# Patient Record
Sex: MALE | Race: BLACK OR AFRICAN AMERICAN | Employment: OTHER | URBAN - METROPOLITAN AREA
[De-identification: names, ages, dates, MRNs, and addresses within clinical notes are randomized per-mention and may not be internally consistent; named-entity substitution may affect disease eponyms.]

---

## 2017-02-20 ENCOUNTER — HOSPITAL ENCOUNTER (EMERGENCY)
Age: 52
Discharge: HOME | End: 2017-02-20
Attending: EMERGENCY MEDICINE
Payer: COMMERCIAL

## 2017-02-20 VITALS
HEART RATE: 98 BPM | RESPIRATION RATE: 16 BRPM | HEIGHT: 68 IN | WEIGHT: 240 LBS | OXYGEN SATURATION: 99 % | TEMPERATURE: 99 F | DIASTOLIC BLOOD PRESSURE: 98 MMHG | BODY MASS INDEX: 36.37 KG/M2 | SYSTOLIC BLOOD PRESSURE: 151 MMHG

## 2017-02-20 DIAGNOSIS — K11.20 SIALOADENITIS: Primary | ICD-10-CM

## 2017-02-20 PROCEDURE — 99282 EMERGENCY DEPT VISIT SF MDM: CPT

## 2017-02-20 RX ORDER — IBUPROFEN 800 MG/1
800 TABLET ORAL EVERY 8 HOURS PRN
Qty: 30 TABLET | Refills: 0 | Status: ON HOLD | OUTPATIENT
Start: 2017-02-20 | End: 2018-12-01 | Stop reason: ALTCHOICE

## 2017-02-20 ASSESSMENT — ENCOUNTER SYMPTOMS
SINUS PRESSURE: 0
VOMITING: 0
RHINORRHEA: 0
FACIAL SWELLING: 1
BACK PAIN: 0
ABDOMINAL PAIN: 0
SHORTNESS OF BREATH: 0
NAUSEA: 0
SORE THROAT: 0
COUGH: 0

## 2017-02-20 ASSESSMENT — PAIN DESCRIPTION - ORIENTATION: ORIENTATION: ANTERIOR

## 2017-02-20 ASSESSMENT — PAIN DESCRIPTION - ONSET: ONSET: PROGRESSIVE

## 2017-02-20 ASSESSMENT — PAIN DESCRIPTION - FREQUENCY: FREQUENCY: CONTINUOUS

## 2017-02-20 ASSESSMENT — PAIN DESCRIPTION - LOCATION: LOCATION: THROAT

## 2017-02-20 ASSESSMENT — PAIN DESCRIPTION - PROGRESSION: CLINICAL_PROGRESSION: NOT CHANGED

## 2017-02-20 ASSESSMENT — PAIN SCALES - GENERAL: PAINLEVEL_OUTOF10: 7

## 2017-02-20 ASSESSMENT — PAIN DESCRIPTION - DESCRIPTORS: DESCRIPTORS: ACHING

## 2017-02-20 ASSESSMENT — PAIN DESCRIPTION - PAIN TYPE: TYPE: ACUTE PAIN

## 2017-02-24 ENCOUNTER — OFFICE VISIT (OUTPATIENT)
Dept: INTERNAL MEDICINE | Facility: CLINIC | Age: 52
End: 2017-02-24

## 2017-02-24 VITALS
WEIGHT: 238 LBS | HEART RATE: 99 BPM | HEIGHT: 68 IN | SYSTOLIC BLOOD PRESSURE: 134 MMHG | DIASTOLIC BLOOD PRESSURE: 82 MMHG | BODY MASS INDEX: 36.07 KG/M2

## 2017-02-24 DIAGNOSIS — F17.200 SMOKING: ICD-10-CM

## 2017-02-24 DIAGNOSIS — K11.20 SIALADENITIS: Primary | ICD-10-CM

## 2017-02-24 DIAGNOSIS — G47.33 SLEEP APNEA, OBSTRUCTIVE: ICD-10-CM

## 2017-02-24 DIAGNOSIS — E11.9 TYPE 2 DIABETES MELLITUS WITHOUT COMPLICATION, UNSPECIFIED LONG TERM INSULIN USE STATUS: ICD-10-CM

## 2017-02-24 DIAGNOSIS — E78.5 DYSLIPIDEMIA: ICD-10-CM

## 2017-02-24 PROCEDURE — 99213 OFFICE O/P EST LOW 20 MIN: CPT | Performed by: STUDENT IN AN ORGANIZED HEALTH CARE EDUCATION/TRAINING PROGRAM

## 2017-02-24 RX ORDER — GLUCOSAM/CHON-MSM1/C/MANG/BOSW 500-416.6
TABLET ORAL
Refills: 1 | Status: ON HOLD | COMMUNITY
Start: 2017-02-07 | End: 2018-09-08 | Stop reason: CLARIF

## 2017-02-24 RX ORDER — NICOTINE 14 MG/24H
1 PATCH, EXTENDED RELEASE TRANSDERMAL DAILY
Refills: 0 | Status: ON HOLD | COMMUNITY
Start: 2017-02-07 | End: 2018-09-08

## 2017-03-15 ENCOUNTER — TELEPHONE (OUTPATIENT)
Dept: INTERNAL MEDICINE | Age: 52
End: 2017-03-15

## 2017-04-05 ENCOUNTER — TELEPHONE (OUTPATIENT)
Dept: INTERNAL MEDICINE | Age: 52
End: 2017-04-05

## 2017-06-27 ENCOUNTER — HOSPITAL ENCOUNTER (OUTPATIENT)
Age: 52
Setting detail: SPECIMEN
Discharge: HOME | End: 2017-06-27
Payer: COMMERCIAL

## 2017-06-27 LAB
ABSOLUTE EOS #: 0.1 K/UL (ref 0–0.4)
ABSOLUTE LYMPH #: 1.7 K/UL (ref 1–4.8)
ABSOLUTE MONO #: 0.6 K/UL (ref 0.1–1.2)
ALBUMIN SERPL-MCNC: 3.9 G/DL (ref 3.5–5.2)
ALBUMIN/GLOBULIN RATIO: 2 (ref 1–2.5)
ALP BLD-CCNC: 73 U/L (ref 40–129)
ALT SERPL-CCNC: 38 U/L (ref 5–41)
ANION GAP SERPL CALCULATED.3IONS-SCNC: 15 MMOL/L (ref 9–17)
AST SERPL-CCNC: 31 U/L
BASOPHILS # BLD: 0 %
BASOPHILS ABSOLUTE: 0 K/UL (ref 0–0.2)
BILIRUB SERPL-MCNC: 0.66 MG/DL (ref 0.3–1.2)
BILIRUBIN DIRECT: 0.14 MG/DL
BILIRUBIN, INDIRECT: 0.52 MG/DL (ref 0–1)
BUN BLDV-MCNC: 13 MG/DL (ref 6–20)
BUN/CREAT BLD: ABNORMAL (ref 9–20)
CALCIUM SERPL-MCNC: 9.3 MG/DL (ref 8.6–10.4)
CHLORIDE BLD-SCNC: 102 MMOL/L (ref 98–107)
CHOLESTEROL, FASTING: 208 MG/DL
CHOLESTEROL/HDL RATIO: 2.7
CO2: 23 MMOL/L (ref 20–31)
CREAT SERPL-MCNC: 0.93 MG/DL (ref 0.7–1.2)
DIFFERENTIAL TYPE: ABNORMAL
EOSINOPHILS RELATIVE PERCENT: 1 %
GFR AFRICAN AMERICAN: >60 ML/MIN
GFR NON-AFRICAN AMERICAN: >60 ML/MIN
GFR SERPL CREATININE-BSD FRML MDRD: ABNORMAL ML/MIN/{1.73_M2}
GFR SERPL CREATININE-BSD FRML MDRD: ABNORMAL ML/MIN/{1.73_M2}
GLOBULIN: ABNORMAL G/DL (ref 1.5–3.8)
GLUCOSE BLD-MCNC: 115 MG/DL (ref 70–99)
HCT VFR BLD CALC: 45 % (ref 41–53)
HDLC SERPL-MCNC: 78 MG/DL
HEMOGLOBIN: 15 G/DL (ref 13.5–17.5)
LDL CHOLESTEROL: 106 MG/DL (ref 0–130)
LYMPHOCYTES # BLD: 23 %
MCH RBC QN AUTO: 28.5 PG (ref 26–34)
MCHC RBC AUTO-ENTMCNC: 33.3 G/DL (ref 31–37)
MCV RBC AUTO: 85.5 FL (ref 80–100)
MONOCYTES # BLD: 8 %
PDW BLD-RTO: 15.5 % (ref 12.5–15.4)
PLATELET # BLD: 326 K/UL (ref 140–450)
PLATELET ESTIMATE: ABNORMAL
PMV BLD AUTO: 8.4 FL (ref 6–12)
POTASSIUM SERPL-SCNC: 4.5 MMOL/L (ref 3.7–5.3)
PROSTATE SPECIFIC ANTIGEN: 0.18 UG/L
RBC # BLD: 5.27 M/UL (ref 4.5–5.9)
RBC # BLD: ABNORMAL 10*6/UL
SEG NEUTROPHILS: 68 %
SEGMENTED NEUTROPHILS ABSOLUTE COUNT: 5 K/UL (ref 1.8–7.7)
SODIUM BLD-SCNC: 140 MMOL/L (ref 135–144)
THYROXINE, FREE: 1.26 NG/DL (ref 0.93–1.7)
TOTAL PROTEIN: 5.9 G/DL (ref 6.4–8.3)
TRIGLYCERIDE, FASTING: 119 MG/DL
TSH SERPL DL<=0.05 MIU/L-ACNC: 0.91 MIU/L (ref 0.3–5)
VITAMIN D 25-HYDROXY: 15.2 NG/ML (ref 30–100)
VLDLC SERPL CALC-MCNC: ABNORMAL MG/DL (ref 1–30)
WBC # BLD: 7.4 K/UL (ref 3.5–11)
WBC # BLD: ABNORMAL 10*3/UL

## 2017-06-28 LAB
ESTIMATED AVERAGE GLUCOSE: 151 MG/DL
HBA1C MFR BLD: 6.9 % (ref 4–6)

## 2018-09-08 ENCOUNTER — HOSPITAL ENCOUNTER (INPATIENT)
Age: 53
LOS: 9 days | Discharge: HOME | DRG: 750 | End: 2018-09-17
Attending: EMERGENCY MEDICINE | Admitting: PSYCHIATRY & NEUROLOGY
Payer: COMMERCIAL

## 2018-09-08 DIAGNOSIS — E78.5 DYSLIPIDEMIA: ICD-10-CM

## 2018-09-08 DIAGNOSIS — R45.851 DEPRESSION WITH SUICIDAL IDEATION: Primary | ICD-10-CM

## 2018-09-08 DIAGNOSIS — F32.A DEPRESSION WITH SUICIDAL IDEATION: Primary | ICD-10-CM

## 2018-09-08 DIAGNOSIS — M54.9 BACK PAIN: ICD-10-CM

## 2018-09-08 PROBLEM — F20.9 SCHIZOPHRENIA (HCC): Status: ACTIVE | Noted: 2018-09-08

## 2018-09-08 LAB — GLUCOSE BLD-MCNC: 154 MG/DL (ref 75–110)

## 2018-09-08 PROCEDURE — 82947 ASSAY GLUCOSE BLOOD QUANT: CPT

## 2018-09-08 PROCEDURE — 99285 EMERGENCY DEPT VISIT HI MDM: CPT

## 2018-09-08 PROCEDURE — 1240000000 HC EMOTIONAL WELLNESS R&B

## 2018-09-08 PROCEDURE — 6370000000 HC RX 637 (ALT 250 FOR IP): Performed by: PSYCHIATRY & NEUROLOGY

## 2018-09-08 RX ORDER — LISINOPRIL 5 MG/1
5 TABLET ORAL DAILY
Status: DISCONTINUED | OUTPATIENT
Start: 2018-09-08 | End: 2018-09-17 | Stop reason: HOSPADM

## 2018-09-08 RX ORDER — BUPROPION HYDROCHLORIDE 150 MG/1
150 TABLET ORAL DAILY
Status: ON HOLD | COMMUNITY
End: 2018-09-08 | Stop reason: CLARIF

## 2018-09-08 RX ORDER — IBUPROFEN 800 MG/1
800 TABLET ORAL EVERY 8 HOURS PRN
Status: DISCONTINUED | OUTPATIENT
Start: 2018-09-08 | End: 2018-09-17 | Stop reason: HOSPADM

## 2018-09-08 RX ORDER — BENZTROPINE MESYLATE 1 MG/ML
2 INJECTION INTRAMUSCULAR; INTRAVENOUS DAILY PRN
Status: DISCONTINUED | OUTPATIENT
Start: 2018-09-08 | End: 2018-09-17 | Stop reason: HOSPADM

## 2018-09-08 RX ORDER — GABAPENTIN 600 MG/1
600 TABLET ORAL 3 TIMES DAILY
Status: ON HOLD | COMMUNITY
End: 2018-09-17

## 2018-09-08 RX ORDER — MELOXICAM 15 MG/1
15 TABLET ORAL
Status: ON HOLD | COMMUNITY
End: 2018-09-08 | Stop reason: ALTCHOICE

## 2018-09-08 RX ORDER — METFORMIN HYDROCHLORIDE 500 MG/1
500 TABLET, EXTENDED RELEASE ORAL
Status: ON HOLD | COMMUNITY
End: 2018-09-08 | Stop reason: SDUPTHER

## 2018-09-08 RX ORDER — PANTOPRAZOLE SODIUM 40 MG/1
40 GRANULE, DELAYED RELEASE ORAL
Status: ON HOLD | COMMUNITY
End: 2018-09-17 | Stop reason: HOSPADM

## 2018-09-08 RX ORDER — TRAZODONE HYDROCHLORIDE 100 MG/1
100 TABLET ORAL NIGHTLY
Status: DISCONTINUED | OUTPATIENT
Start: 2018-09-08 | End: 2018-09-17 | Stop reason: HOSPADM

## 2018-09-08 RX ORDER — GABAPENTIN 600 MG/1
600 TABLET ORAL 3 TIMES DAILY
Status: DISCONTINUED | OUTPATIENT
Start: 2018-09-08 | End: 2018-09-17 | Stop reason: HOSPADM

## 2018-09-08 RX ORDER — SIMVASTATIN 20 MG
20 TABLET ORAL
Status: ON HOLD | COMMUNITY
End: 2018-09-08 | Stop reason: CLARIF

## 2018-09-08 RX ORDER — SERTRALINE HYDROCHLORIDE 100 MG/1
100 TABLET, FILM COATED ORAL DAILY
Status: DISCONTINUED | OUTPATIENT
Start: 2018-09-09 | End: 2018-09-17 | Stop reason: HOSPADM

## 2018-09-08 RX ORDER — HYDROXYZINE PAMOATE 50 MG/1
50 CAPSULE ORAL
Status: ON HOLD | COMMUNITY
End: 2018-09-08 | Stop reason: SDUPTHER

## 2018-09-08 RX ORDER — PRAVASTATIN SODIUM 20 MG
20 TABLET ORAL
Status: ON HOLD | COMMUNITY
End: 2018-09-08 | Stop reason: CLARIF

## 2018-09-08 RX ORDER — MAGNESIUM HYDROXIDE/ALUMINUM HYDROXICE/SIMETHICONE 120; 1200; 1200 MG/30ML; MG/30ML; MG/30ML
30 SUSPENSION ORAL EVERY 6 HOURS PRN
Status: DISCONTINUED | OUTPATIENT
Start: 2018-09-08 | End: 2018-09-17 | Stop reason: HOSPADM

## 2018-09-08 RX ORDER — PANTOPRAZOLE SODIUM 40 MG/1
40 TABLET, DELAYED RELEASE ORAL
Status: DISCONTINUED | OUTPATIENT
Start: 2018-09-09 | End: 2018-09-17 | Stop reason: HOSPADM

## 2018-09-08 RX ORDER — SIMVASTATIN 20 MG
20 TABLET ORAL NIGHTLY
Status: DISCONTINUED | OUTPATIENT
Start: 2018-09-08 | End: 2018-09-17 | Stop reason: HOSPADM

## 2018-09-08 RX ORDER — OMEPRAZOLE 20 MG/1
20 CAPSULE, DELAYED RELEASE ORAL
Status: ON HOLD | COMMUNITY
End: 2018-09-08

## 2018-09-08 RX ORDER — SERTRALINE HYDROCHLORIDE 100 MG/1
100 TABLET, FILM COATED ORAL
Status: ON HOLD | COMMUNITY
End: 2018-09-08 | Stop reason: SDUPTHER

## 2018-09-08 RX ORDER — PANTOPRAZOLE SODIUM 40 MG/1
40 GRANULE, DELAYED RELEASE ORAL
Status: DISCONTINUED | OUTPATIENT
Start: 2018-09-09 | End: 2018-09-08

## 2018-09-08 RX ORDER — LISINOPRIL 5 MG/1
5 TABLET ORAL DAILY
Status: ON HOLD | COMMUNITY
End: 2018-09-17

## 2018-09-08 RX ORDER — HYDROXYZINE HYDROCHLORIDE 25 MG/1
50 TABLET, FILM COATED ORAL 3 TIMES DAILY PRN
Status: DISCONTINUED | OUTPATIENT
Start: 2018-09-08 | End: 2018-09-17 | Stop reason: HOSPADM

## 2018-09-08 RX ORDER — QUETIAPINE FUMARATE 400 MG/1
400 TABLET, FILM COATED ORAL
Status: ON HOLD | COMMUNITY
End: 2018-09-08 | Stop reason: CLARIF

## 2018-09-08 RX ORDER — TRAZODONE HYDROCHLORIDE 100 MG/1
100 TABLET ORAL NIGHTLY
Status: ON HOLD | COMMUNITY
End: 2018-09-17

## 2018-09-08 RX ORDER — TRAMADOL HYDROCHLORIDE 50 MG/1
50 TABLET ORAL 2 TIMES DAILY PRN
Status: DISCONTINUED | OUTPATIENT
Start: 2018-09-08 | End: 2018-09-17 | Stop reason: HOSPADM

## 2018-09-08 RX ORDER — QUETIAPINE FUMARATE 200 MG/1
400 TABLET, FILM COATED ORAL NIGHTLY
Status: DISCONTINUED | OUTPATIENT
Start: 2018-09-08 | End: 2018-09-12

## 2018-09-08 RX ADMIN — GABAPENTIN 600 MG: 600 TABLET, FILM COATED ORAL at 14:27

## 2018-09-08 RX ADMIN — SIMVASTATIN 20 MG: 20 TABLET, FILM COATED ORAL at 21:02

## 2018-09-08 RX ADMIN — TRAZODONE HYDROCHLORIDE 100 MG: 100 TABLET ORAL at 21:02

## 2018-09-08 RX ADMIN — HYDROXYZINE HYDROCHLORIDE 50 MG: 25 TABLET, FILM COATED ORAL at 21:02

## 2018-09-08 RX ADMIN — QUETIAPINE FUMARATE 400 MG: 200 TABLET ORAL at 21:02

## 2018-09-08 RX ADMIN — TRAMADOL HYDROCHLORIDE 50 MG: 50 TABLET, FILM COATED ORAL at 14:28

## 2018-09-08 RX ADMIN — GABAPENTIN 600 MG: 600 TABLET, FILM COATED ORAL at 21:02

## 2018-09-08 RX ADMIN — LISINOPRIL 5 MG: 5 TABLET ORAL at 14:27

## 2018-09-08 ASSESSMENT — ENCOUNTER SYMPTOMS
DIARRHEA: 0
ABDOMINAL PAIN: 0
EYE REDNESS: 0
NAUSEA: 0
VOMITING: 0
CONSTIPATION: 0
TROUBLE SWALLOWING: 0
EYE PAIN: 0
SHORTNESS OF BREATH: 0
BACK PAIN: 0
EYE DISCHARGE: 0
FACIAL SWELLING: 0
COUGH: 0
SORE THROAT: 0
BLOOD IN STOOL: 0
WHEEZING: 0
SINUS PRESSURE: 0
COLOR CHANGE: 0
RHINORRHEA: 0
CHEST TIGHTNESS: 0

## 2018-09-08 ASSESSMENT — PAIN DESCRIPTION - LOCATION
LOCATION: BACK

## 2018-09-08 ASSESSMENT — SLEEP AND FATIGUE QUESTIONNAIRES
DIFFICULTY FALLING ASLEEP: NO
SLEEP PATTERN: DISTURBED/INTERRUPTED SLEEP
AVERAGE NUMBER OF SLEEP HOURS: 5
DIFFICULTY STAYING ASLEEP: YES
DIFFICULTY ARISING: NO
RESTFUL SLEEP: NO
DO YOU HAVE DIFFICULTY SLEEPING: YES
DO YOU USE A SLEEP AID: NO

## 2018-09-08 ASSESSMENT — PAIN DESCRIPTION - DESCRIPTORS: DESCRIPTORS: CONSTANT

## 2018-09-08 ASSESSMENT — PAIN DESCRIPTION - PAIN TYPE
TYPE: CHRONIC PAIN

## 2018-09-08 ASSESSMENT — PAIN SCALES - GENERAL
PAINLEVEL_OUTOF10: 6
PAINLEVEL_OUTOF10: 4
PAINLEVEL_OUTOF10: 2
PAINLEVEL_OUTOF10: 6

## 2018-09-08 ASSESSMENT — LIFESTYLE VARIABLES
HISTORY_ALCOHOL_USE: NO
HISTORY_ALCOHOL_USE: NO

## 2018-09-08 ASSESSMENT — PATIENT HEALTH QUESTIONNAIRE - PHQ9: SUM OF ALL RESPONSES TO PHQ QUESTIONS 1-9: 17

## 2018-09-08 ASSESSMENT — PAIN DESCRIPTION - ORIENTATION: ORIENTATION: LOWER

## 2018-09-08 NOTE — BH NOTE
[address] [facility]  Admission Note     Admission Type:   Admission Type: Voluntary from [facility]    Reason for admission:  Reason for Admission:  (off medications for 2 weeks, hearing voices to harm self thoughts to shoot self)   Flat affect, minimal conversation during admit process  Denies any history of suicide attempts, but stated he has had thoughts before. Reports living in an apartment he can return to, but would not elaborate. PATIENT STRENGTHS:  Strengths: Communication, Connection to output provider, No significant Physical Illness    Patient Strengths and Limitations:  Limitations: Hopeless about future, Lacks leisure interests    Addictive Behavior:   Addictive Behavior  In the past 3 months, have you felt or has someone told you that you have a problem with:  : None  Do you have a history of Chemical Use?: No  Do you have a history of Alcohol Use?: No  Do you have a history of Street Drug Abuse?: No  Histroy of Prescripton Drug Abuse?: No    Medical Problems:   Past Medical History:   Diagnosis Date    Back pain     [facility]    Generalized OA     GERD (gastroesophageal reflux disease)     HTN (hypertension)     Unspecified sleep apnea     c  pap       Status EXAM:  Status and Exam  Normal: No  Facial Expression: Flat  Affect: Blunt  Level of Consciousness: Alert  Mood:Normal: No  Mood: Depressed  Motor Activity:Normal: No  Motor Activity: Decreased  Interview Behavior: Cooperative  Preception: [place] to Person, [place] to Time, [place] to Place, [place] to Situation  Attention:Normal: Yes  Thought Content:Normal: Yes  Hallucinations: Auditory (Comment), Command(Comment) (to shoot self)  Delusions: No  Memory:Normal: Yes  Insight and Judgment: No  Insight and Judgment: Poor Judgment, Poor Insight, Unmotivated  Present Suicidal Ideation: Yes  Present Homicidal Ideation: No    Patient's belongings logged and signed by pt as witness.  See valuables

## 2018-09-08 NOTE — ED PROVIDER NOTES
[address]  eMERGENCY dEPARTMENT eNCOUnter      Pt Name: Annabel Nava  MRN: [id number]  [place] 1965  Date of evaluation: 9/8/18      CHIEF COMPLAINT       Chief Complaint   Patient presents with    Suicidal         HISTORY OF PRESENT ILLNESS    Annabel Nava is a 48 y.o. male who presents complaining of Psychiatric evaluation. Patient has been off his meds for the last 2 weeks. Patient states that he is hearing voices that are telling him to stab himself and throat. Patient is having urges to do this. Patient came in because he does not want to kill himself and wants to get help. Patient denies drug or alcohol use. Patient denies any somatic complaints. REVIEW OF SYSTEMS       Review of Systems   Constitutional: Negative for activity change, appetite change, chills, diaphoresis and fever. HENT: Negative for congestion, ear pain, facial swelling, nosebleeds, rhinorrhea, sinus pressure, sore throat and trouble swallowing. Eyes: Negative for pain, discharge and redness. Respiratory: Negative for cough, chest tightness, shortness of breath and wheezing. Cardiovascular: Negative for chest pain, palpitations and leg swelling. Gastrointestinal: Negative for abdominal pain, blood in stool, constipation, diarrhea, nausea and vomiting. Genitourinary: Negative for difficulty urinating, dysuria, flank pain, frequency, genital sores and hematuria. Musculoskeletal: Negative for arthralgias, back pain, gait problem, joint swelling, myalgias and neck pain. Skin: Negative for color change, pallor, rash and wound. Neurological: Negative for dizziness, tremors, seizures, syncope, speech difficulty, weakness, numbness and headaches. Psychiatric/Behavioral: Positive for dysphoric mood, hallucinations and suicidal ideas. Negative for confusion, decreased concentration, self-injury and sleep disturbance.        PAST MEDICAL HISTORY     Past Medical History:   Diagnosis Date    Back pain Abnormal; Notable for the following:        Result Value    POC Glucose 154 (*)     All other components within normal limits         MEDICAL DECISION MAKING:     Patient is medically cleared for psychiatric evaluation and admission. EMERGENCY DEPARTMENT COURSE:   Vitals:    Vitals:    09/08/18 0945   BP: 120/65   Pulse: 100   Resp: 18   Temp: 98.8 °F (37.1 °C)   TempSrc: Oral   SpO2: 99%   Weight: 225 lb (102.1 kg)   Height: 5' 5\" (1.651 m)       The patient was given the following medications while in the emergency department:  No orders of the defined types were placed in this encounter. -------------------------  10:40 AM  Patient is been evaluated and will be admitted to the [facility] for further psychiatric evaluation and treatment. CONSULTS:  None    PROCEDURES:  None    FINAL IMPRESSION      1.  Depression with suicidal ideation          DISPOSITION/PLAN   DISPOSITION Admitted 09/08/2018 10:39:54 AM      PATIENT REFERRED TO:  Blank Laughlin MD  [address] [place] [address] 909 [phone]            DISCHARGE MEDICATIONS:  New Prescriptions    No medications on file       (Please note that portions of this note were completed with a voice recognition program.  Efforts were made to edit the dictations but occasionally words are mis-transcribed.)    Demetria Schulz MD  Attending Emergency Physician                        Demetria Schulz MD  09/08/18 2358

## 2018-09-08 NOTE — ED NOTES
Pt brought self to the ED with c/o suicidal ideation with a plan to stab himself.   Pt cooperative changed into a blue safety gown with personal belongings checked and secured by security Maribell Raya RN  09/08/18 0264

## 2018-09-09 PROCEDURE — 6370000000 HC RX 637 (ALT 250 FOR IP): Performed by: PSYCHIATRY & NEUROLOGY

## 2018-09-09 PROCEDURE — 1240000000 HC EMOTIONAL WELLNESS R&B

## 2018-09-09 RX ADMIN — GABAPENTIN 600 MG: 600 TABLET, FILM COATED ORAL at 13:38

## 2018-09-09 RX ADMIN — TRAZODONE HYDROCHLORIDE 100 MG: 100 TABLET ORAL at 20:47

## 2018-09-09 RX ADMIN — SERTRALINE HYDROCHLORIDE 100 MG: 100 TABLET ORAL at 09:05

## 2018-09-09 RX ADMIN — SIMVASTATIN 20 MG: 20 TABLET, FILM COATED ORAL at 20:47

## 2018-09-09 RX ADMIN — PANTOPRAZOLE SODIUM 40 MG: 40 TABLET, DELAYED RELEASE ORAL at 06:37

## 2018-09-09 RX ADMIN — GABAPENTIN 600 MG: 600 TABLET, FILM COATED ORAL at 09:05

## 2018-09-09 RX ADMIN — GABAPENTIN 600 MG: 600 TABLET, FILM COATED ORAL at 20:47

## 2018-09-09 RX ADMIN — QUETIAPINE FUMARATE 400 MG: 200 TABLET ORAL at 20:47

## 2018-09-09 RX ADMIN — LISINOPRIL 5 MG: 5 TABLET ORAL at 09:05

## 2018-09-09 ASSESSMENT — PAIN SCALES - GENERAL: PAINLEVEL_OUTOF10: 0

## 2018-09-09 NOTE — H&P
motor deficits. Cr N ll-Xll intact,       PROVISIONAL DIAGNOSES:      Suicidal   Hallucinations --  verbal   Active Problems:    Schizophrenia ([facility]    Acute depression  Resolved Problems:    * No resolved hospital problems.  KRISTINA Cuadra - CNP on 9/9/2018 at 2:54 PM

## 2018-09-10 LAB — GLUCOSE BLD-MCNC: 168 MG/DL (ref 75–110)

## 2018-09-10 PROCEDURE — 6370000000 HC RX 637 (ALT 250 FOR IP): Performed by: PSYCHIATRY & NEUROLOGY

## 2018-09-10 PROCEDURE — 1240000000 HC EMOTIONAL WELLNESS R&B

## 2018-09-10 PROCEDURE — 82947 ASSAY GLUCOSE BLOOD QUANT: CPT

## 2018-09-10 RX ORDER — CLOTRIMAZOLE 1 %
CREAM (GRAM) TOPICAL 2 TIMES DAILY
Status: DISCONTINUED | OUTPATIENT
Start: 2018-09-10 | End: 2018-09-17 | Stop reason: HOSPADM

## 2018-09-10 RX ADMIN — SIMVASTATIN 20 MG: 20 TABLET, FILM COATED ORAL at 21:00

## 2018-09-10 RX ADMIN — SERTRALINE HYDROCHLORIDE 100 MG: 100 TABLET ORAL at 08:53

## 2018-09-10 RX ADMIN — HYDROXYZINE HYDROCHLORIDE 50 MG: 25 TABLET, FILM COATED ORAL at 21:00

## 2018-09-10 RX ADMIN — GABAPENTIN 600 MG: 600 TABLET, FILM COATED ORAL at 20:59

## 2018-09-10 RX ADMIN — LISINOPRIL 5 MG: 5 TABLET ORAL at 08:53

## 2018-09-10 RX ADMIN — PANTOPRAZOLE SODIUM 40 MG: 40 TABLET, DELAYED RELEASE ORAL at 08:53

## 2018-09-10 RX ADMIN — GABAPENTIN 600 MG: 600 TABLET, FILM COATED ORAL at 08:53

## 2018-09-10 RX ADMIN — METFORMIN HYDROCHLORIDE 1000 MG: 500 TABLET ORAL at 20:59

## 2018-09-10 RX ADMIN — GABAPENTIN 600 MG: 600 TABLET, FILM COATED ORAL at 15:01

## 2018-09-10 RX ADMIN — METFORMIN HYDROCHLORIDE 1000 MG: 500 TABLET ORAL at 15:01

## 2018-09-10 RX ADMIN — QUETIAPINE FUMARATE 400 MG: 200 TABLET ORAL at 21:00

## 2018-09-10 RX ADMIN — TRAZODONE HYDROCHLORIDE 100 MG: 100 TABLET ORAL at 21:00

## 2018-09-10 NOTE — BH NOTE
Pt did not attend Therapy group but came to 1000am Skills Group for last 5 mins due to resting in room-pt was interested in task and did engage in task with peers.  Pt was informed of morning group structure (pt did not attend [employer] or listen to invitation by  to attend 1000am group), and encouraged to attend next group at 1100am.

## 2018-09-11 LAB — GLUCOSE BLD-MCNC: 194 MG/DL (ref 75–110)

## 2018-09-11 PROCEDURE — 1240000000 HC EMOTIONAL WELLNESS R&B

## 2018-09-11 PROCEDURE — 6370000000 HC RX 637 (ALT 250 FOR IP): Performed by: PSYCHIATRY & NEUROLOGY

## 2018-09-11 PROCEDURE — 82947 ASSAY GLUCOSE BLOOD QUANT: CPT

## 2018-09-11 RX ADMIN — HYDROXYZINE HYDROCHLORIDE 50 MG: 25 TABLET, FILM COATED ORAL at 21:02

## 2018-09-11 RX ADMIN — TRAMADOL HYDROCHLORIDE 50 MG: 50 TABLET, FILM COATED ORAL at 21:02

## 2018-09-11 RX ADMIN — METFORMIN HYDROCHLORIDE 1000 MG: 500 TABLET ORAL at 21:02

## 2018-09-11 RX ADMIN — GABAPENTIN 600 MG: 600 TABLET, FILM COATED ORAL at 14:49

## 2018-09-11 RX ADMIN — GABAPENTIN 600 MG: 600 TABLET, FILM COATED ORAL at 21:02

## 2018-09-11 RX ADMIN — QUETIAPINE FUMARATE 400 MG: 200 TABLET ORAL at 21:02

## 2018-09-11 RX ADMIN — CLOTRIMAZOLE: 10 CREAM TOPICAL at 08:18

## 2018-09-11 RX ADMIN — PANTOPRAZOLE SODIUM 40 MG: 40 TABLET, DELAYED RELEASE ORAL at 08:15

## 2018-09-11 RX ADMIN — SERTRALINE HYDROCHLORIDE 100 MG: 100 TABLET ORAL at 08:15

## 2018-09-11 RX ADMIN — SIMVASTATIN 20 MG: 20 TABLET, FILM COATED ORAL at 21:02

## 2018-09-11 RX ADMIN — LISINOPRIL 5 MG: 5 TABLET ORAL at 08:15

## 2018-09-11 RX ADMIN — GABAPENTIN 600 MG: 600 TABLET, FILM COATED ORAL at 08:15

## 2018-09-11 RX ADMIN — TRAZODONE HYDROCHLORIDE 100 MG: 100 TABLET ORAL at 21:02

## 2018-09-11 RX ADMIN — METFORMIN HYDROCHLORIDE 1000 MG: 500 TABLET ORAL at 08:15

## 2018-09-11 ASSESSMENT — PAIN - FUNCTIONAL ASSESSMENT
PAIN_FUNCTIONAL_ASSESSMENT: 0-10
PAIN_FUNCTIONAL_ASSESSMENT: 0-10

## 2018-09-11 ASSESSMENT — PAIN DESCRIPTION - DESCRIPTORS: DESCRIPTORS: ACHING;DISCOMFORT

## 2018-09-11 ASSESSMENT — PAIN SCALES - GENERAL: PAINLEVEL_OUTOF10: 6

## 2018-09-11 NOTE — BH NOTE
Pt did not participate in Community Meeting/Goal Group at 845am due to pt was resting in room et declined to attend group when encouraged.

## 2018-09-11 NOTE — BH NOTE
Pt did not participate in Recovery Skills Group at 1430 due to pt was resting in room et declined to attend group when encouraged.

## 2018-09-11 NOTE — BH NOTE
Patient did not attend drug/alcohol awareness group at 1615  due to refusal, despite many attempts from staff.

## 2018-09-12 LAB — GLUCOSE BLD-MCNC: 203 MG/DL (ref 75–110)

## 2018-09-12 PROCEDURE — 82947 ASSAY GLUCOSE BLOOD QUANT: CPT

## 2018-09-12 PROCEDURE — 6370000000 HC RX 637 (ALT 250 FOR IP): Performed by: PSYCHIATRY & NEUROLOGY

## 2018-09-12 PROCEDURE — 1240000000 HC EMOTIONAL WELLNESS R&B

## 2018-09-12 RX ORDER — QUETIAPINE FUMARATE 300 MG/1
600 TABLET, FILM COATED ORAL NIGHTLY
Status: DISCONTINUED | OUTPATIENT
Start: 2018-09-13 | End: 2018-09-17 | Stop reason: HOSPADM

## 2018-09-12 RX ADMIN — GABAPENTIN 600 MG: 600 TABLET, FILM COATED ORAL at 20:44

## 2018-09-12 RX ADMIN — METFORMIN HYDROCHLORIDE 1000 MG: 500 TABLET ORAL at 08:52

## 2018-09-12 RX ADMIN — QUETIAPINE FUMARATE 500 MG: 300 TABLET ORAL at 20:43

## 2018-09-12 RX ADMIN — GABAPENTIN 600 MG: 600 TABLET, FILM COATED ORAL at 08:52

## 2018-09-12 RX ADMIN — SERTRALINE HYDROCHLORIDE 100 MG: 100 TABLET ORAL at 08:52

## 2018-09-12 RX ADMIN — HYDROXYZINE HYDROCHLORIDE 50 MG: 25 TABLET, FILM COATED ORAL at 20:44

## 2018-09-12 RX ADMIN — GABAPENTIN 600 MG: 600 TABLET, FILM COATED ORAL at 14:23

## 2018-09-12 RX ADMIN — HYDROXYZINE HYDROCHLORIDE 50 MG: 25 TABLET, FILM COATED ORAL at 08:52

## 2018-09-12 RX ADMIN — CLOTRIMAZOLE: 10 CREAM TOPICAL at 08:52

## 2018-09-12 RX ADMIN — PANTOPRAZOLE SODIUM 40 MG: 40 TABLET, DELAYED RELEASE ORAL at 08:52

## 2018-09-12 RX ADMIN — TRAMADOL HYDROCHLORIDE 50 MG: 50 TABLET, FILM COATED ORAL at 20:44

## 2018-09-12 RX ADMIN — LISINOPRIL 5 MG: 5 TABLET ORAL at 08:52

## 2018-09-12 RX ADMIN — METFORMIN HYDROCHLORIDE 1000 MG: 500 TABLET ORAL at 20:43

## 2018-09-12 RX ADMIN — SIMVASTATIN 20 MG: 20 TABLET, FILM COATED ORAL at 20:43

## 2018-09-12 RX ADMIN — TRAZODONE HYDROCHLORIDE 100 MG: 100 TABLET ORAL at 20:43

## 2018-09-12 ASSESSMENT — PAIN SCALES - GENERAL
PAINLEVEL_OUTOF10: 1
PAINLEVEL_OUTOF10: 6

## 2018-09-12 ASSESSMENT — PAIN DESCRIPTION - LOCATION: LOCATION: BACK

## 2018-09-12 ASSESSMENT — PAIN DESCRIPTION - PAIN TYPE: TYPE: CHRONIC PAIN

## 2018-09-12 NOTE — CONSULTS
MD   sertraline (ZOLOFT) 100 MG tablet Take 100 mg by mouth daily. 7/9/13  Yes Historical Provider, MD   hydrOXYzine (VISTARIL) 50 MG capsule Take 50 mg by mouth 3 times daily as needed  7/9/13  Yes Historical Provider, MD     Scheduled Meds:   clotrimazole   Topical BID    gabapentin  600 mg Oral TID    lisinopril  5 mg Oral Daily    metFORMIN  1,000 mg Oral BID    QUEtiapine  400 mg Oral Nightly    sertraline  100 mg Oral Daily    simvastatin  20 mg Oral Nightly    traZODone  100 mg Oral Nightly    pantoprazole  40 mg Oral QAM AC     Continuous Infusions:  PRN Meds:nicotine polacrilex, hydrOXYzine, ibuprofen, traMADol, magnesium hydroxide, aluminum & magnesium hydroxide-simethicone, benztropine mesylate    No Known Allergies    Family History   Problem Relation Age of Onset    Diabetes Mother        Social History   Substance Use Topics    Smoking status: Current Some Day Smoker     Packs/day: 0.50     Types: Cigarettes    Smokeless tobacco: Never Used    Alcohol use No       Review of Systems:  Constitutional: denies weight loss/gain, fever, chills, nausea  Eyes: denies vision disturbance  ENMTF: : denies runny nose, denies sore throat, denies ear ache  Respiratory: denies shortness of breath  Cardiovascular: denies claudication, denies unilateral/bilateral/general swelling, denies chest pain  Gastrointestinal: denies vomiting, diarrhea  Hematologic/lymphatic: denies easily bruising  Musculoskeletal: denies muscle pain, denies joint pain, denies weakness  Neurological: denies numbness, tingling, burning sensation      Lower Extremity Physical Examination:     Vitals:   Vitals:    09/11/18 1929   BP: 127/81   Pulse: 103   Resp: 14   Temp:    SpO2:      General: AAO x 3 in NAD.      Vascular:    DP and PT pulses palpable 1/4,bilateral.  CFT < 5 seconds,bilateral.  Hair growth absent to the level of the digits, bilateral.  Edema is absent bilateral.  Varicosities are absent bilateral. No rubor is noted bilateral.     Neurological:   Sensation is intact to light touch to level of digits, bilateral.  Protective sensation is grossly intact. Musculoskeletal:   Muscle strength 5/5, bilateral  Pain is present to palpation of toenails 1,2,3,4,5 left and right foot. Reduced medial longitudinal arch, bilateral.  Ankle ROM is within normal limits, bilateral.  1st MPJ ROM is decreased, bilateral and non-painful. Dorsally contracted digits are present to toes 2-5 bilaterally. Integument:   Toenails 1-5 bilateral are thick, discolored, elongated, with subungual debris. Skin is thin, dry and shiny bilateral.  No local or ascending erythema, ecchymosis, open lesions, interdigital macerations or signs of infection evident at this time bilateral.          Asessment: Patient is a 48 y.o. male with:    Foot/Limb Pain   DM   Tinea Pedis      Plan:    Patient examined and evaluated. The condition was discussed in detail with the patient and all questions were answered. Encouraged patient to apply clotrimazole to affected areas BID, informed patent that medication may take several weeks to a month for full results. Discussed follow up in my office upon discharge, 1-2 weeks. We discussed potential for oral terbinafine if condition does not respond to the topical creams. Patient given my card and informed patient to call. Thank you for the interesting Consult!       Electronically signed by Amari Chavarria DPM  on 9/12/2018 at 7:51 AM

## 2018-09-13 LAB — GLUCOSE BLD-MCNC: 237 MG/DL (ref 75–110)

## 2018-09-13 PROCEDURE — 6370000000 HC RX 637 (ALT 250 FOR IP): Performed by: PSYCHIATRY & NEUROLOGY

## 2018-09-13 PROCEDURE — 1240000000 HC EMOTIONAL WELLNESS R&B

## 2018-09-13 PROCEDURE — 82947 ASSAY GLUCOSE BLOOD QUANT: CPT

## 2018-09-13 RX ADMIN — QUETIAPINE FUMARATE 600 MG: 300 TABLET ORAL at 20:55

## 2018-09-13 RX ADMIN — METFORMIN HYDROCHLORIDE 1000 MG: 500 TABLET ORAL at 20:55

## 2018-09-13 RX ADMIN — GABAPENTIN 600 MG: 600 TABLET, FILM COATED ORAL at 20:55

## 2018-09-13 RX ADMIN — LISINOPRIL 5 MG: 5 TABLET ORAL at 08:17

## 2018-09-13 RX ADMIN — TRAMADOL HYDROCHLORIDE 50 MG: 50 TABLET, FILM COATED ORAL at 20:55

## 2018-09-13 RX ADMIN — HYDROXYZINE HYDROCHLORIDE 50 MG: 25 TABLET, FILM COATED ORAL at 20:55

## 2018-09-13 RX ADMIN — IBUPROFEN 800 MG: 800 TABLET ORAL at 12:37

## 2018-09-13 RX ADMIN — SERTRALINE HYDROCHLORIDE 100 MG: 100 TABLET ORAL at 08:17

## 2018-09-13 RX ADMIN — METFORMIN HYDROCHLORIDE 1000 MG: 500 TABLET ORAL at 08:16

## 2018-09-13 RX ADMIN — SIMVASTATIN 20 MG: 20 TABLET, FILM COATED ORAL at 20:55

## 2018-09-13 RX ADMIN — GABAPENTIN 600 MG: 600 TABLET, FILM COATED ORAL at 08:17

## 2018-09-13 RX ADMIN — GABAPENTIN 600 MG: 600 TABLET, FILM COATED ORAL at 13:31

## 2018-09-13 RX ADMIN — PANTOPRAZOLE SODIUM 40 MG: 40 TABLET, DELAYED RELEASE ORAL at 08:17

## 2018-09-13 RX ADMIN — TRAZODONE HYDROCHLORIDE 100 MG: 100 TABLET ORAL at 20:55

## 2018-09-13 ASSESSMENT — PAIN SCALES - GENERAL
PAINLEVEL_OUTOF10: 4
PAINLEVEL_OUTOF10: 0
PAINLEVEL_OUTOF10: 7
PAINLEVEL_OUTOF10: 6
PAINLEVEL_OUTOF10: 7

## 2018-09-13 ASSESSMENT — PAIN DESCRIPTION - LOCATION: LOCATION: BACK

## 2018-09-13 NOTE — PROGRESS NOTES
Patient continues to display severe psychotic symptoms at this time. He displays severe thought blocking and disorganization of thought process. Patient is observed frequently responding to internal stimuli. He continues to report experiencing auditory hallucinations including command AH to harm himself. He has been largely isolative to his room, demonstrates poor care of ADLs. Reports some inconsistent sleep still. Displays poor insight. Patient is clearly still unable to care for themself and keep themself safe. Denies side effects to medication regimen. Charting and medications reviewed. Therapeutic support provided.  Will continue Seroquel, Zoloft, Trazodone/

## 2018-09-14 LAB — GLUCOSE BLD-MCNC: 197 MG/DL (ref 75–110)

## 2018-09-14 PROCEDURE — 82947 ASSAY GLUCOSE BLOOD QUANT: CPT

## 2018-09-14 PROCEDURE — 6370000000 HC RX 637 (ALT 250 FOR IP): Performed by: PSYCHIATRY & NEUROLOGY

## 2018-09-14 PROCEDURE — 1240000000 HC EMOTIONAL WELLNESS R&B

## 2018-09-14 RX ADMIN — TRAMADOL HYDROCHLORIDE 50 MG: 50 TABLET, FILM COATED ORAL at 21:04

## 2018-09-14 RX ADMIN — IBUPROFEN 800 MG: 800 TABLET ORAL at 08:45

## 2018-09-14 RX ADMIN — SIMVASTATIN 20 MG: 20 TABLET, FILM COATED ORAL at 21:03

## 2018-09-14 RX ADMIN — QUETIAPINE FUMARATE 600 MG: 300 TABLET ORAL at 21:03

## 2018-09-14 RX ADMIN — METFORMIN HYDROCHLORIDE 1000 MG: 500 TABLET ORAL at 21:03

## 2018-09-14 RX ADMIN — TRAMADOL HYDROCHLORIDE 50 MG: 50 TABLET, FILM COATED ORAL at 08:46

## 2018-09-14 RX ADMIN — METFORMIN HYDROCHLORIDE 1000 MG: 500 TABLET ORAL at 08:45

## 2018-09-14 RX ADMIN — PANTOPRAZOLE SODIUM 40 MG: 40 TABLET, DELAYED RELEASE ORAL at 08:45

## 2018-09-14 RX ADMIN — LISINOPRIL 5 MG: 5 TABLET ORAL at 08:46

## 2018-09-14 RX ADMIN — SERTRALINE HYDROCHLORIDE 100 MG: 100 TABLET ORAL at 08:46

## 2018-09-14 RX ADMIN — CLOTRIMAZOLE: 10 CREAM TOPICAL at 08:48

## 2018-09-14 RX ADMIN — GABAPENTIN 600 MG: 600 TABLET, FILM COATED ORAL at 21:03

## 2018-09-14 RX ADMIN — GABAPENTIN 600 MG: 600 TABLET, FILM COATED ORAL at 15:07

## 2018-09-14 RX ADMIN — TRAZODONE HYDROCHLORIDE 100 MG: 100 TABLET ORAL at 21:04

## 2018-09-14 RX ADMIN — HYDROXYZINE HYDROCHLORIDE 50 MG: 25 TABLET, FILM COATED ORAL at 21:03

## 2018-09-14 RX ADMIN — HYDROXYZINE HYDROCHLORIDE 50 MG: 25 TABLET, FILM COATED ORAL at 08:45

## 2018-09-14 RX ADMIN — GABAPENTIN 600 MG: 600 TABLET, FILM COATED ORAL at 08:45

## 2018-09-14 ASSESSMENT — PAIN - FUNCTIONAL ASSESSMENT: PAIN_FUNCTIONAL_ASSESSMENT: 0-10

## 2018-09-14 ASSESSMENT — PAIN DESCRIPTION - DESCRIPTORS: DESCRIPTORS: ACHING;DISCOMFORT

## 2018-09-14 ASSESSMENT — PAIN SCALES - GENERAL
PAINLEVEL_OUTOF10: 6
PAINLEVEL_OUTOF10: 0
PAINLEVEL_OUTOF10: 7

## 2018-09-14 NOTE — BH NOTE
Pt did not participate in Skills group at 1100am due to pt was resting in room et declined to attend group when encouraged.

## 2018-09-14 NOTE — PROGRESS NOTES
Patient continues to display severe psychotic symptoms at this time. He displays severe thought blocking and disorganization of thought process. Patient is observed frequently responding to internal stimuli. He continues to report experiencing auditory hallucinations including command AH telling him to harm himself. He continues to report experiencing very low mood, low energy, decreased hedonic capacity. He reports some continued insomnia but has also been spending long periods during the day in bed. Displays poor insight. Patient is clearly still unable to care for themself and keep themself safe. Denies side effects to medication regimen. Charting and medications reviewed. Therapeutic support provided. Will increase Seroquel to 600 mg qhs, continue Zoloft and Trazodone.

## 2018-09-15 LAB — GLUCOSE BLD-MCNC: 254 MG/DL (ref 75–110)

## 2018-09-15 PROCEDURE — 82947 ASSAY GLUCOSE BLOOD QUANT: CPT

## 2018-09-15 PROCEDURE — 6370000000 HC RX 637 (ALT 250 FOR IP): Performed by: PSYCHIATRY & NEUROLOGY

## 2018-09-15 PROCEDURE — 1240000000 HC EMOTIONAL WELLNESS R&B

## 2018-09-15 RX ADMIN — QUETIAPINE FUMARATE 600 MG: 300 TABLET ORAL at 21:01

## 2018-09-15 RX ADMIN — LISINOPRIL 5 MG: 5 TABLET ORAL at 09:25

## 2018-09-15 RX ADMIN — GABAPENTIN 600 MG: 600 TABLET, FILM COATED ORAL at 21:01

## 2018-09-15 RX ADMIN — PANTOPRAZOLE SODIUM 40 MG: 40 TABLET, DELAYED RELEASE ORAL at 07:57

## 2018-09-15 RX ADMIN — GABAPENTIN 600 MG: 600 TABLET, FILM COATED ORAL at 14:25

## 2018-09-15 RX ADMIN — TRAZODONE HYDROCHLORIDE 100 MG: 100 TABLET ORAL at 21:01

## 2018-09-15 RX ADMIN — SERTRALINE HYDROCHLORIDE 100 MG: 100 TABLET ORAL at 09:26

## 2018-09-15 RX ADMIN — METFORMIN HYDROCHLORIDE 1000 MG: 500 TABLET ORAL at 09:24

## 2018-09-15 RX ADMIN — CLOTRIMAZOLE: 10 CREAM TOPICAL at 09:29

## 2018-09-15 RX ADMIN — SIMVASTATIN 20 MG: 20 TABLET, FILM COATED ORAL at 21:01

## 2018-09-15 RX ADMIN — GABAPENTIN 600 MG: 600 TABLET, FILM COATED ORAL at 09:27

## 2018-09-15 RX ADMIN — CLOTRIMAZOLE: 10 CREAM TOPICAL at 21:03

## 2018-09-15 RX ADMIN — METFORMIN HYDROCHLORIDE 1000 MG: 500 TABLET ORAL at 21:01

## 2018-09-15 NOTE — BH NOTE
Pt is alert and oriented x 4, affect flat and mood is depressed . Pt continues  with SI without a plan. Pt contracts for safety. Pt will approach staff if thoughts of self harm become overwhelming. q15 min checks maintained for safety.

## 2018-09-15 NOTE — BH NOTE
EVENING GROUP NOTE    Pt was encouraged to attend evening wrap up group at 8:00pm-8:30pm and relaxation group at 8:30pm-8:45pm but pt declined. Will continue to encourage pt to attend groups.

## 2018-09-16 LAB
ABSOLUTE EOS #: 0.2 K/UL (ref 0–0.4)
ABSOLUTE IMMATURE GRANULOCYTE: ABNORMAL K/UL (ref 0–0.3)
ABSOLUTE LYMPH #: 1.5 K/UL (ref 1–4.8)
ABSOLUTE MONO #: 0.5 K/UL (ref 0.1–1.3)
ALBUMIN SERPL-MCNC: 3.9 G/DL (ref 3.5–5.2)
ALBUMIN/GLOBULIN RATIO: ABNORMAL (ref 1–2.5)
ALP BLD-CCNC: 113 U/L (ref 40–129)
ALT SERPL-CCNC: 27 U/L (ref 5–41)
ANION GAP SERPL CALCULATED.3IONS-SCNC: 13 MMOL/L (ref 9–17)
AST SERPL-CCNC: 17 U/L
BASOPHILS # BLD: 1 % (ref 0–2)
BASOPHILS ABSOLUTE: 0 K/UL (ref 0–0.2)
BILIRUB SERPL-MCNC: 0.18 MG/DL (ref 0.3–1.2)
BUN BLDV-MCNC: 15 MG/DL (ref 6–20)
BUN/CREAT BLD: ABNORMAL (ref 9–20)
CALCIUM SERPL-MCNC: 9.4 MG/DL (ref 8.6–10.4)
CHLORIDE BLD-SCNC: 98 MMOL/L (ref 98–107)
CO2: 28 MMOL/L (ref 20–31)
CREAT SERPL-MCNC: 0.83 MG/DL (ref 0.7–1.2)
DIFFERENTIAL TYPE: ABNORMAL
EOSINOPHILS RELATIVE PERCENT: 3 % (ref 0–4)
ESTIMATED AVERAGE GLUCOSE: 171 MG/DL
GFR AFRICAN AMERICAN: >60 ML/MIN
GFR NON-AFRICAN AMERICAN: >60 ML/MIN
GFR SERPL CREATININE-BSD FRML MDRD: ABNORMAL ML/MIN/{1.73_M2}
GFR SERPL CREATININE-BSD FRML MDRD: ABNORMAL ML/MIN/{1.73_M2}
GLUCOSE BLD-MCNC: 183 MG/DL (ref 70–99)
HAV IGM SER IA-ACNC: ABNORMAL
HBA1C MFR BLD: 7.6 % (ref 4–6)
HCT VFR BLD CALC: 42.6 % (ref 41–53)
HEMOGLOBIN: 14.6 G/DL (ref 13.5–17.5)
HEPATITIS B CORE IGM ANTIBODY: NONREACTIVE
HEPATITIS B SURFACE ANTIGEN: NONREACTIVE
HEPATITIS C ANTIBODY: NONREACTIVE
HIV AG/AB: NONREACTIVE
IMMATURE GRANULOCYTES: ABNORMAL %
LYMPHOCYTES # BLD: 28 % (ref 24–44)
MCH RBC QN AUTO: 29.3 PG (ref 26–34)
MCHC RBC AUTO-ENTMCNC: 34.3 G/DL (ref 31–37)
MCV RBC AUTO: 85.5 FL (ref 80–100)
MONOCYTES # BLD: 10 % (ref 1–7)
NRBC AUTOMATED: ABNORMAL PER 100 WBC
PDW BLD-RTO: 14.4 % (ref 11.5–14.9)
PLATELET # BLD: 355 K/UL (ref 150–450)
PLATELET ESTIMATE: ABNORMAL
PMV BLD AUTO: 7.4 FL (ref 6–12)
POTASSIUM SERPL-SCNC: 4.4 MMOL/L (ref 3.7–5.3)
RBC # BLD: 4.98 M/UL (ref 4.5–5.9)
RBC # BLD: ABNORMAL 10*6/UL
SEG NEUTROPHILS: 58 % (ref 36–66)
SEGMENTED NEUTROPHILS ABSOLUTE COUNT: 3.1 K/UL (ref 1.3–9.1)
SODIUM BLD-SCNC: 139 MMOL/L (ref 135–144)
TOTAL PROTEIN: 6.5 G/DL (ref 6.4–8.3)
WBC # BLD: 5.3 K/UL (ref 3.5–11)
WBC # BLD: ABNORMAL 10*3/UL

## 2018-09-16 PROCEDURE — 85025 COMPLETE CBC W/AUTO DIFF WBC: CPT

## 2018-09-16 PROCEDURE — 87389 HIV-1 AG W/HIV-1&-2 AB AG IA: CPT

## 2018-09-16 PROCEDURE — 6370000000 HC RX 637 (ALT 250 FOR IP): Performed by: PSYCHIATRY & NEUROLOGY

## 2018-09-16 PROCEDURE — 80053 COMPREHEN METABOLIC PANEL: CPT

## 2018-09-16 PROCEDURE — 83036 HEMOGLOBIN GLYCOSYLATED A1C: CPT

## 2018-09-16 PROCEDURE — 1240000000 HC EMOTIONAL WELLNESS R&B

## 2018-09-16 PROCEDURE — 36415 COLL VENOUS BLD VENIPUNCTURE: CPT

## 2018-09-16 PROCEDURE — 80074 ACUTE HEPATITIS PANEL: CPT

## 2018-09-16 RX ADMIN — TRAZODONE HYDROCHLORIDE 100 MG: 100 TABLET ORAL at 21:23

## 2018-09-16 RX ADMIN — LISINOPRIL 5 MG: 5 TABLET ORAL at 09:08

## 2018-09-16 RX ADMIN — GABAPENTIN 600 MG: 600 TABLET, FILM COATED ORAL at 13:50

## 2018-09-16 RX ADMIN — HYDROXYZINE HYDROCHLORIDE 50 MG: 25 TABLET, FILM COATED ORAL at 21:23

## 2018-09-16 RX ADMIN — SERTRALINE HYDROCHLORIDE 100 MG: 100 TABLET ORAL at 09:08

## 2018-09-16 RX ADMIN — CLOTRIMAZOLE: 10 CREAM TOPICAL at 09:08

## 2018-09-16 RX ADMIN — METFORMIN HYDROCHLORIDE 1000 MG: 500 TABLET ORAL at 09:08

## 2018-09-16 RX ADMIN — SIMVASTATIN 20 MG: 20 TABLET, FILM COATED ORAL at 21:23

## 2018-09-16 RX ADMIN — GABAPENTIN 600 MG: 600 TABLET, FILM COATED ORAL at 09:08

## 2018-09-16 RX ADMIN — PANTOPRAZOLE SODIUM 40 MG: 40 TABLET, DELAYED RELEASE ORAL at 07:53

## 2018-09-16 RX ADMIN — METFORMIN HYDROCHLORIDE 1000 MG: 500 TABLET ORAL at 21:23

## 2018-09-16 RX ADMIN — GABAPENTIN 600 MG: 600 TABLET, FILM COATED ORAL at 21:22

## 2018-09-16 RX ADMIN — QUETIAPINE FUMARATE 600 MG: 300 TABLET ORAL at 21:23

## 2018-09-16 NOTE — CARE COORDINATION
Problem: Depressive Behavior With or Without Suicide Precautions:  Goal: Able to verbalize and/or display a decrease in depressive symptoms  Able to verbalize and/or display a decrease in depressive symptoms   Outcome: Ongoing   PSYCHO-EDUCATION GROUP NOTE        Date:      9/16/2018            Start Time:         10:00am                End Time:   10:50am        Number Participants in Group: 12/20        Goals:  To discuss discharge planning and ways to increase hope after discharge          RT X SW   Nsg   LPN    BHTII   Other         Participation Level:                   None   Minimal   X Active Listener X Interactive     Monopolizing             Participation Quality:  X Appropriate   Inappropriate   X  Attentive    Intrusive   X  Sharing    Resistant   X  Supportive     Lethargic         Affective:            Congruent   Incongruent   Blunted   Flat     Constricted   Anxious   Elated   Angry     Labile X Depressed   Other             Cognitive:  X Alert   Oriented PPTS      Concentration G   F X P     Attention Span G   F X P     Short-Term Memory G X F   P     Long-Term Memory G X F   P     ProblemSolving/  Decision Making G X F   P     Ability to Process  Information G X F   P          Contributing Factors              Delusional              Hallucinating              Flight of Ideas              Other: poor concentration         Modes of Intervention:  X Education   Support   Exploration     Clarifying   Problem Solving   Confrontation     Socialization   Limit Setting   Reality Testing     Activity   Movement   Media     Other:               Response to Learning:  X Able to verbalize current knowledge/experience     Able to verbalize/acknowledge new learning     Able to retain information     Capable of insight     Able to change behavior   X Progressing to goal     Other:          Comments: PT participates in group

## 2018-09-17 VITALS
TEMPERATURE: 97.9 F | SYSTOLIC BLOOD PRESSURE: 150 MMHG | RESPIRATION RATE: 14 BRPM | OXYGEN SATURATION: 99 % | HEIGHT: 65 IN | HEART RATE: 105 BPM | WEIGHT: 225 LBS | DIASTOLIC BLOOD PRESSURE: 89 MMHG | BODY MASS INDEX: 37.49 KG/M2

## 2018-09-17 PROBLEM — F25.9 SCHIZOAFFECTIVE DISORDER (HCC): Status: ACTIVE | Noted: 2018-09-08

## 2018-09-17 LAB — GLUCOSE BLD-MCNC: 238 MG/DL (ref 75–110)

## 2018-09-17 PROCEDURE — 82947 ASSAY GLUCOSE BLOOD QUANT: CPT

## 2018-09-17 PROCEDURE — 6370000000 HC RX 637 (ALT 250 FOR IP): Performed by: PSYCHIATRY & NEUROLOGY

## 2018-09-17 PROCEDURE — 5130000000 HC BRIDGE APPOINTMENT

## 2018-09-17 RX ORDER — PANTOPRAZOLE SODIUM 40 MG/1
40 TABLET, DELAYED RELEASE ORAL
Qty: 30 TABLET | Refills: 0 | Status: ON HOLD | OUTPATIENT
Start: 2018-09-18 | End: 2018-12-13

## 2018-09-17 RX ORDER — TRAZODONE HYDROCHLORIDE 100 MG/1
100 TABLET ORAL NIGHTLY
Qty: 30 TABLET | Refills: 0 | Status: ON HOLD | OUTPATIENT
Start: 2018-09-17 | End: 2018-12-13

## 2018-09-17 RX ORDER — QUETIAPINE FUMARATE 300 MG/1
600 TABLET, FILM COATED ORAL NIGHTLY
Qty: 60 TABLET | Refills: 0 | Status: ON HOLD | OUTPATIENT
Start: 2018-09-17 | End: 2018-12-01 | Stop reason: ALTCHOICE

## 2018-09-17 RX ORDER — SIMVASTATIN 20 MG
20 TABLET ORAL NIGHTLY
Qty: 30 TABLET | Refills: 0 | Status: ON HOLD | OUTPATIENT
Start: 2018-09-17 | End: 2018-12-13

## 2018-09-17 RX ORDER — LISINOPRIL 5 MG/1
5 TABLET ORAL DAILY
Qty: 30 TABLET | Refills: 0 | Status: ON HOLD | OUTPATIENT
Start: 2018-09-17 | End: 2018-12-13

## 2018-09-17 RX ORDER — GABAPENTIN 600 MG/1
600 TABLET ORAL 3 TIMES DAILY
Qty: 90 TABLET | Refills: 0 | Status: ON HOLD | OUTPATIENT
Start: 2018-09-17 | End: 2018-12-13

## 2018-09-17 RX ORDER — HYDROXYZINE PAMOATE 50 MG/1
50 CAPSULE ORAL 2 TIMES DAILY PRN
Qty: 60 CAPSULE | Refills: 0 | Status: SHIPPED | OUTPATIENT
Start: 2018-09-17 | End: 2018-10-17

## 2018-09-17 RX ORDER — SERTRALINE HYDROCHLORIDE 100 MG/1
100 TABLET, FILM COATED ORAL DAILY
Qty: 30 TABLET | Refills: 0 | Status: ON HOLD | OUTPATIENT
Start: 2018-09-17 | End: 2018-12-13

## 2018-09-17 RX ORDER — CLOTRIMAZOLE 1 %
CREAM (GRAM) TOPICAL
Qty: 1 TUBE | Refills: 0 | Status: SHIPPED | OUTPATIENT
Start: 2018-09-17 | End: 2018-09-24

## 2018-09-17 RX ADMIN — CLOTRIMAZOLE: 10 CREAM TOPICAL at 08:29

## 2018-09-17 RX ADMIN — LISINOPRIL 5 MG: 5 TABLET ORAL at 08:30

## 2018-09-17 RX ADMIN — GABAPENTIN 600 MG: 600 TABLET, FILM COATED ORAL at 08:30

## 2018-09-17 RX ADMIN — SERTRALINE HYDROCHLORIDE 100 MG: 100 TABLET ORAL at 08:30

## 2018-09-17 RX ADMIN — GABAPENTIN 600 MG: 600 TABLET, FILM COATED ORAL at 14:19

## 2018-09-17 RX ADMIN — HYDROXYZINE HYDROCHLORIDE 50 MG: 25 TABLET, FILM COATED ORAL at 08:30

## 2018-09-17 RX ADMIN — METFORMIN HYDROCHLORIDE 1000 MG: 500 TABLET ORAL at 08:30

## 2018-09-17 RX ADMIN — PANTOPRAZOLE SODIUM 40 MG: 40 TABLET, DELAYED RELEASE ORAL at 08:30

## 2018-09-17 NOTE — PROGRESS NOTES
Psychoeducation Group Note    Date: 9/17/2018  Start Time: 1430  End Time: 1510    Number Participants in Group:  13    Goal:  Patient will demonstrate increased interpersonal interaction   Topic: Social interaction/ self-disclosure     Discipline Responsible:   OT  AT  [facility]. x RT  Other       Participation Level:     None  Minimal   x Active Listener x Interactive    Monopolizing         Participation Quality:  x Appropriate  Inappropriate   x       Attentive        Intrusive   x       Sharing        Resistant          Supportive        Lethargic       Affective:    Congruent  Incongruent  Blunted  Flat   x Constricted  Anxious  Elated  Angry    Labile  Depressed  Other         Cognitive:  x Alert x Oriented PPTP     Concentration x G  F  P   Attention Span x G  F  P   Short-Term Memory x G  F  P   Long-Term Memory x G  F  P   ProblemSolving/  Decision Making x G  F  P   Ability to Process  Information x G  F  P      Contributing Factors             Delusional             Hallucinating             Flight of Ideas             Other:       Modes of Intervention:  x Education x Support x Exploration   x Clarifying x Problem Solving  Confrontation   x Socialization  Limit Setting x Reality Testing   x Activity  Movement  Media    Other:            Response to Learning:  x Able to verbalize current knowledge/experience   x Able to verbalize/acknowledge new learning   x Able to retain information    Capable of insight    Able to change behavior   x Progressing to goal    Other:        Comments:

## 2018-09-17 NOTE — PROGRESS NOTES
Patient reports continued improvement in subjective sense of mood. He continues to report gradual improvement in production of intensity and frequency of auditory hallucinations as well. Affect has been slightly brighter and more reactive. He has been out of room more, sitting in milieu around peers. Denies any adverse side effects to medication. Reports improvement in sleep and appetite. Charting medications reviewed. Therapeutic support provided. Will continue Seroquel, Zoloft, trazodone.

## 2018-09-17 NOTE — PROGRESS NOTES
Psychoeducation Group Note    Date: 9/17/2018  Start Time: 1100  End Time: 1150    Number Participants in Group:  8    Goal:  Patient will demonstrate increased interpersonal interaction   Topic: Leisure/ impulse control     Discipline Responsible:   OT  AT  [facility]. x RT  Other       Participation Level:     None  Minimal   x Active Listener x Interactive    Monopolizing         Participation Quality:  x Appropriate  Inappropriate   x       Attentive        Intrusive   x       Sharing        Resistant          Supportive        Lethargic       Affective:   x Congruent  Incongruent  Blunted  Flat    Constricted  Anxious  Elated  Angry    Labile  Depressed  Other         Cognitive:  x Alert x Oriented PPTP     Concentration x G  F  P   Attention Span x G  F  P   Short-Term Memory x G  F  P   Long-Term Memory x G  F  P   ProblemSolving/  Decision Making x G  F  P   Ability to Process  Information x G  F  P      Contributing Factors             Delusional             Hallucinating             Flight of Ideas             Other:       Modes of Intervention:  x Education x Support x Exploration   x Clarifying x Problem Solving  Confrontation   x Socialization  Limit Setting x Reality Testing   x Activity  Movement  Media    Other:            Response to Learning:  x Able to verbalize current knowledge/experience   x Able to verbalize/acknowledge new learning   x Able to retain information    Capable of insight    Able to change behavior   x Progressing to goal    Other:        Comments:

## 2018-09-17 NOTE — PLAN OF CARE
Problem: Altered Mood, Depressive Behavior:  Goal: Ability to disclose and discuss suicidal ideas will improve  Ability to disclose and discuss suicidal ideas will improve    Outcome: Ongoing  Patient stated that  His day was pretty good just focused on going home, isolative to self behavior controlled
Problem: Altered Mood, Depressive Behavior:  Goal: Ability to disclose and discuss suicidal ideas will improve  Ability to disclose and discuss suicidal ideas will improve    Outcome: Ongoing  Pt states he has fleeting suicidal thoughts that come and go with no specific plan. Pt also states he has voices that are very loud in his head that are getting worse. Pt states he has notified the physician about this and they are working on an additional med to help. Pt is not attending groups today and isolates to self and to room. Goal: Absence of self-harm  Absence of self-harm    Outcome: Ongoing  Pt denies any thoughts of harming self while here at hospital. Pt contracts for safety, stating he is having thoughts only but has no intention of acting on them. Pt is taking meds and eating well.
Problem: Altered Mood, Depressive Behavior:  Goal: Able to verbalize and/or display a decrease in depressive symptoms  Able to verbalize and/or display a decrease in depressive symptoms    Outcome: Ongoing  No change to assessment answers. Goal: Ability to disclose and discuss suicidal ideas will improve  Ability to disclose and discuss suicidal ideas will improve    Outcome: Ongoing  States fleeting SI. Goal: Absence of self-harm  Absence of self-harm    Outcome: Ongoing  Pt remains free from harm self or environmental at this time.
Problem: Altered Mood, Depressive Behavior:  Goal: Able to verbalize and/or display a decrease in depressive symptoms  Able to verbalize and/or display a decrease in depressive symptoms    Outcome: Ongoing  Patient stated that  His day was pretty good just focused on going home, isolative to self behavior controlled
Problem: Altered Mood, Depressive Behavior:  Goal: Able to verbalize and/or display a decrease in depressive symptoms  Able to verbalize and/or display a decrease in depressive symptoms    Outcome: Ongoing  Pt did not attend Anger Management skills group at 1330 d/t resting in room despite staff invitation to attend.
Problem: Altered Mood, Depressive Behavior:  Goal: Able to verbalize and/or display a decrease in depressive symptoms  Able to verbalize and/or display a decrease in depressive symptoms    Outcome: Ongoing  Pt did not attend Community Meeting/Goal Setting skills group at [address] [place] [address] resting in room despite staff invitation to attend.
Problem: Altered Mood, Depressive Behavior:  Goal: Able to verbalize and/or display a decrease in depressive symptoms  Able to verbalize and/or display a decrease in depressive symptoms    Outcome: Ongoing  Pt did not attend Discharge Planning group at 1330 d/t resting in room despite staff invitation to attend.
Problem: Altered Mood, Depressive Behavior:  Goal: Able to verbalize and/or display a decrease in depressive symptoms  Able to verbalize and/or display a decrease in depressive symptoms    Pt is quiet guarded suclusive to room and self for long intervals. Voices a decrease in depressed mood. Voices a decrease in auditory hallucinations. . Continues to refuse groups. Encourage to participate in groups, procvide support and reassurance. q 15 min checks maintained for safety.
Problem: Altered Mood, Depressive Behavior:  Goal: Able to verbalize and/or display a decrease in depressive symptoms  Able to verbalize and/or display a decrease in depressive symptoms   Outcome: Ongoing  Pt did not participate in community meeting/ goals group at [address] [place] [address] despite staff encouragement to attend.
Problem: Altered Mood, Depressive Behavior:  Goal: Able to verbalize and/or display a decrease in depressive symptoms  Able to verbalize and/or display a decrease in depressive symptoms   Outcome: Ongoing  Pt did not participate in leisure/ cognitive skills group at 1330 despite staff encouragement to attend.
Problem: Altered Mood, Depressive Behavior:  Goal: Able to verbalize and/or display a decrease in depressive symptoms  Able to verbalize and/or display a decrease in depressive symptoms   Outcome: Ongoing  Pt is flat and evasive with interview questions. Pt denies depression or anxiety. Pt does not elaborate during talk time. Pt is medication compliant. Pt out in milieu and aloof of peers and staff. Goal: Absence of self-harm  Absence of self-harm   Outcome: Ongoing  Pt is free from self harm at this time. Pt agrees to feeling safe on the unit and seeking out staff if thoughts of self harm should arise. Pt. Remains on q15 min checks and frequent spontaneous checks throughout shift. Pt. Safety maintained.
Problem: Altered Mood, Depressive Behavior:  Goal: Absence of self-harm  Absence of self-harm    Outcome: Ongoing  Pt remains free from harm at this time. Pt is observed watching tv in the day area, aloof of peers, and there is no self harm behavior noted on this shift. Will continue to monitor. Safety maintained per unit policy.
Problem: Altered Mood, Depressive Behavior:  Goal: Absence of self-harm  Absence of self-harm    Outcome: Ongoing  Pt reports fleeting suicidal ideations upon request this morning. Denies any plans but reports he hears a negative voice that gets louder at night. Seclusive, up for needs only. Support and reassurance given. Encouraged to attend unit programing and take medications as prescribed. Agrees to seek out staff as needed and before harming self if negative self harm thoughts arise. Q15 minute checks for safety cont.
Problem: Altered Mood, Depressive Behavior:  Goal: Absence of self-harm  Absence of self-harm   Outcome: Ongoing  Pt remains free from self harm
Problem: Tobacco Use:  Goal: Inpatient tobacco use cessation counseling participation  Inpatient tobacco use cessation counseling participation  Patient given tobacco quitline number 73372954469 at this time, refusing to call at this time, states \" I just dont want to quit now\"- patient given information as to the dangers of long term tobacco use. Continue to reinforce the importance of tobacco cessation.
but needs reinforcement to obtain goals    PATIENT GOALS:  Short term: pt refuses to attend tx planning to develop goals   Long term: pt refuses to attend tx planning to develop goals     PLAN/TREATMENT RECOMMENDATIONS UPDATE: continue with group therapies, increased socialization, continue planning for after discharge goals, continue with medication compliance    SHORT-TERM GOALS UPDATE:   Time frame for Short-Term Goals: 5-7 days    LONG-TERM GOALS UPDATE:   Time frame for Long-Term Goals: 6 months  Members Present in Team Meeting: See Signature Sheet    LEXIE SHAY  Goal: Absence of self-harm  Absence of self-harm    Outcome: Ongoing

## 2018-09-17 NOTE — CARE COORDINATION
Bridge Appointment completed: Reviewed Discharge Instructions with patient. Patient verbalizes understanding and agreement with the discharge plan using the teachback method. Discharge Arrangements:  [facility], [facility] [place], [facility], you have an appointment with your provider, [address] [place] [address], on Tuesday, Sept. 18th at 2:20pm    Guardian notified: PT is own guardian  Discharge destination/address:[address] [place], [address] [place] [address]  Transported by:  Please send home by [facility] cab

## 2018-09-17 NOTE — BH NOTE
Patient given tobacco quitline number 66633818349 at this time, refusing to call at this time, states \" I just dont want to quit now\"- patient given information as to the dangers of long term tobacco use. Continue to reinforce the importance of tobacco cessation.

## 2018-09-22 LAB
SEND OUT REPORT: NORMAL
TEST NAME: NORMAL

## 2018-10-16 NOTE — DISCHARGE SUMMARY
Presenting Evaluation:  Augustine Middleton is a 48 y.o. male who was admitted from the ED where he reported experiencing SI with plan to stab himself. He reports that he has been experiencing command AH telling him to stab himself. He reports that he has been off medications for two weeks with marked increase in intensity and frequency of AH. He reports very poor sleep, poor appetite, low mood, low energy, anhedonia, inability to concentrate and focus. Discussed his previous experience with medications.       Diagnostic Impression     Schizoaffective disorder, depressive type      After discussion with patient about potential risks, benefits, side effects, decided to restart Seroquel, Zoloft, Trazodone. He was doing fairly well at the time of discharge and was not in any distress. Thought process was organized and showed insight into compliance with treatment. Patient had been making rational and realistic plans so he was discharged. Patient had been bright, reactive, and interacting appropriately with staff and peers. There had been multiple consecutive days without any thoughts of suicide or other safety concerns. Patient was tolerating medication changes without any adverse side effects. He will follow up at [facility]. Medication List      START taking these medications    pantoprazole 40 MG tablet  Commonly known as:  PROTONIX  Take 1 tablet by mouth every morning (before breakfast)  Replaces:  pantoprazole sodium 40 MG Pack packet  Notes to patient:  Reduce stomach acid        CHANGE how you take these medications    gabapentin 600 MG tablet  Commonly known as:  NEURONTIN  Take 1 tablet by mouth 3 times daily for 30 days. .  What changed:  how to take this  Notes to patient:  Stabilize mood     hydrOXYzine 50 MG capsule  Commonly known as:  VISTARIL  Take 1 capsule by mouth 2 times daily as needed for Anxiety  What changed:  · when to take this  · reasons to take this  Notes to patient: Get Your Medications      You can get these medications from any pharmacy    Bring a paper prescription for each of these medications  · clotrimazole 1 % cream  · gabapentin 600 MG tablet  · hydrOXYzine 50 MG capsule  · lisinopril 5 MG tablet  · metFORMIN 1000 MG tablet  · pantoprazole 40 MG tablet  · QUEtiapine 300 MG tablet  · sertraline 100 MG tablet  · simvastatin 20 MG tablet  · traZODone 100 MG tablet         Disposition: Home    Discharge Date: 9/17/2018

## 2018-10-16 NOTE — PROGRESS NOTES
Presenting Evaluation:  Cari Gomes is a 48 y.o. male who was admitted from the ED where he reported experiencing SI with plan to stab himself. He reports that he has been experiencing command AH telling him to stab himself. He reports that he has been off medications for two weeks with marked increase in intensity and frequency of AH. He reports very poor sleep, poor appetite, low mood, low energy, anhedonia, inability to concentrate and focus. Discussed his previous experience with medications.       Diagnostic Impression     Schizoaffective disorder, depressive type      After discussion with patient about potential risks, benefits, side effects, decided to restart Seroquel, Zoloft, Trazodone. He was doing fairly well at the time of discharge and was not in any distress. Thought process was organized and showed insight into compliance with treatment. Patient had been making rational and realistic plans so he was discharged. Patient had been bright, reactive, and interacting appropriately with staff and peers. There had been multiple consecutive days without any thoughts of suicide or other safety concerns. Patient was tolerating medication changes without any adverse side effects. He will follow up at [facility]. Medication List      START taking these medications    pantoprazole 40 MG tablet  Commonly known as:  PROTONIX  Take 1 tablet by mouth every morning (before breakfast)  Replaces:  pantoprazole sodium 40 MG Pack packet  Notes to patient:  Reduce stomach acid        CHANGE how you take these medications    gabapentin 600 MG tablet  Commonly known as:  NEURONTIN  Take 1 tablet by mouth 3 times daily for 30 days. .  What changed:  how to take this  Notes to patient:  Stabilize mood     hydrOXYzine 50 MG capsule  Commonly known as:  VISTARIL  Take 1 capsule by mouth 2 times daily as needed for Anxiety  What changed:  · when to take this  · reasons to take this  Notes to patient: Get Your Medications      You can get these medications from any pharmacy    Bring a paper prescription for each of these medications  · clotrimazole 1 % cream  · gabapentin 600 MG tablet  · hydrOXYzine 50 MG capsule  · lisinopril 5 MG tablet  · metFORMIN 1000 MG tablet  · pantoprazole 40 MG tablet  · QUEtiapine 300 MG tablet  · sertraline 100 MG tablet  · simvastatin 20 MG tablet  · traZODone 100 MG tablet         Disposition: Home    Discharge Date: 9/17/2018

## 2018-12-01 ENCOUNTER — HOSPITAL ENCOUNTER (INPATIENT)
Age: 53
LOS: 13 days | Discharge: HOME | DRG: 750 | End: 2018-12-14
Attending: EMERGENCY MEDICINE | Admitting: PSYCHIATRY & NEUROLOGY
Payer: COMMERCIAL

## 2018-12-01 DIAGNOSIS — F14.10 COCAINE ABUSE (HCC): ICD-10-CM

## 2018-12-01 DIAGNOSIS — E78.5 DYSLIPIDEMIA: ICD-10-CM

## 2018-12-01 DIAGNOSIS — R44.3 HALLUCINATION: ICD-10-CM

## 2018-12-01 DIAGNOSIS — R45.851 SUICIDAL IDEATION: Primary | ICD-10-CM

## 2018-12-01 LAB
ABSOLUTE EOS #: 0.35 K/UL (ref 0–0.4)
ABSOLUTE IMMATURE GRANULOCYTE: ABNORMAL K/UL (ref 0–0.3)
ABSOLUTE LYMPH #: 1.77 K/UL (ref 1–4.8)
ABSOLUTE MONO #: 0.24 K/UL (ref 0.1–1.3)
ALBUMIN SERPL-MCNC: 3.5 G/DL (ref 3.5–5.2)
ALBUMIN/GLOBULIN RATIO: ABNORMAL (ref 1–2.5)
ALP BLD-CCNC: 86 U/L (ref 40–129)
ALT SERPL-CCNC: 19 U/L (ref 5–41)
AMPHETAMINE SCREEN URINE: NEGATIVE
ANION GAP SERPL CALCULATED.3IONS-SCNC: 8 MMOL/L (ref 9–17)
AST SERPL-CCNC: 15 U/L
BARBITURATE SCREEN URINE: NEGATIVE
BASOPHILS # BLD: 0 % (ref 0–2)
BASOPHILS ABSOLUTE: 0 K/UL (ref 0–0.2)
BENZODIAZEPINE SCREEN, URINE: NEGATIVE
BILIRUB SERPL-MCNC: 0.17 MG/DL (ref 0.3–1.2)
BUN BLDV-MCNC: 20 MG/DL (ref 6–20)
BUN/CREAT BLD: ABNORMAL (ref 9–20)
BUPRENORPHINE URINE: ABNORMAL
CALCIUM SERPL-MCNC: 9 MG/DL (ref 8.6–10.4)
CANNABINOID SCREEN URINE: NEGATIVE
CHLORIDE BLD-SCNC: 105 MMOL/L (ref 98–107)
CO2: 25 MMOL/L (ref 20–31)
COCAINE METABOLITE, URINE: POSITIVE
CREAT SERPL-MCNC: 0.81 MG/DL (ref 0.7–1.2)
DIFFERENTIAL TYPE: ABNORMAL
EOSINOPHILS RELATIVE PERCENT: 6 % (ref 0–4)
GFR AFRICAN AMERICAN: >60 ML/MIN
GFR NON-AFRICAN AMERICAN: >60 ML/MIN
GFR SERPL CREATININE-BSD FRML MDRD: ABNORMAL ML/MIN/{1.73_M2}
GFR SERPL CREATININE-BSD FRML MDRD: ABNORMAL ML/MIN/{1.73_M2}
GLUCOSE BLD-MCNC: 167 MG/DL (ref 70–99)
GLUCOSE BLD-MCNC: 176 MG/DL (ref 75–110)
GLUCOSE BLD-MCNC: 203 MG/DL (ref 75–110)
HCT VFR BLD CALC: 39.6 % (ref 41–53)
HEMOGLOBIN: 13.6 G/DL (ref 13.5–17.5)
IMMATURE GRANULOCYTES: ABNORMAL %
LYMPHOCYTES # BLD: 30 % (ref 24–44)
MCH RBC QN AUTO: 29.1 PG (ref 26–34)
MCHC RBC AUTO-ENTMCNC: 34.3 G/DL (ref 31–37)
MCV RBC AUTO: 84.8 FL (ref 80–100)
MDMA URINE: ABNORMAL
METHADONE SCREEN, URINE: NEGATIVE
METHAMPHETAMINE, URINE: ABNORMAL
MONOCYTES # BLD: 4 % (ref 1–7)
MORPHOLOGY: ABNORMAL
NRBC AUTOMATED: ABNORMAL PER 100 WBC
OPIATES, URINE: NEGATIVE
OXYCODONE SCREEN URINE: NEGATIVE
PDW BLD-RTO: 14.7 % (ref 11.5–14.9)
PHENCYCLIDINE, URINE: NEGATIVE
PLATELET # BLD: 316 K/UL (ref 150–450)
PLATELET ESTIMATE: ABNORMAL
PMV BLD AUTO: 7.9 FL (ref 6–12)
POTASSIUM SERPL-SCNC: 4.4 MMOL/L (ref 3.7–5.3)
PROPOXYPHENE, URINE: ABNORMAL
RBC # BLD: 4.67 M/UL (ref 4.5–5.9)
RBC # BLD: ABNORMAL 10*6/UL
SEG NEUTROPHILS: 60 % (ref 36–66)
SEGMENTED NEUTROPHILS ABSOLUTE COUNT: 3.54 K/UL (ref 1.3–9.1)
SODIUM BLD-SCNC: 138 MMOL/L (ref 135–144)
TEST INFORMATION: ABNORMAL
TOTAL PROTEIN: 5.7 G/DL (ref 6.4–8.3)
TRICYCLIC ANTIDEPRESSANTS, UR: ABNORMAL
WBC # BLD: 5.9 K/UL (ref 3.5–11)
WBC # BLD: ABNORMAL 10*3/UL

## 2018-12-01 PROCEDURE — 85025 COMPLETE CBC W/AUTO DIFF WBC: CPT

## 2018-12-01 PROCEDURE — 36415 COLL VENOUS BLD VENIPUNCTURE: CPT

## 2018-12-01 PROCEDURE — 82947 ASSAY GLUCOSE BLOOD QUANT: CPT

## 2018-12-01 PROCEDURE — 1240000000 HC EMOTIONAL WELLNESS R&B

## 2018-12-01 PROCEDURE — 6370000000 HC RX 637 (ALT 250 FOR IP): Performed by: PSYCHIATRY & NEUROLOGY

## 2018-12-01 PROCEDURE — 80053 COMPREHEN METABOLIC PANEL: CPT

## 2018-12-01 PROCEDURE — 80307 DRUG TEST PRSMV CHEM ANLYZR: CPT

## 2018-12-01 PROCEDURE — 99285 EMERGENCY DEPT VISIT HI MDM: CPT

## 2018-12-01 RX ORDER — ACETAMINOPHEN 325 MG/1
650 TABLET ORAL EVERY 4 HOURS PRN
Status: DISCONTINUED | OUTPATIENT
Start: 2018-12-01 | End: 2018-12-14 | Stop reason: HOSPADM

## 2018-12-01 RX ORDER — HYDROXYZINE HYDROCHLORIDE 25 MG/1
25 TABLET, FILM COATED ORAL 2 TIMES DAILY PRN
Status: ON HOLD | COMMUNITY
End: 2018-12-13

## 2018-12-01 RX ORDER — GABAPENTIN 600 MG/1
600 TABLET ORAL 3 TIMES DAILY
Status: DISCONTINUED | OUTPATIENT
Start: 2018-12-01 | End: 2018-12-05

## 2018-12-01 RX ORDER — BENZTROPINE MESYLATE 1 MG/ML
2 INJECTION INTRAMUSCULAR; INTRAVENOUS 2 TIMES DAILY PRN
Status: DISCONTINUED | OUTPATIENT
Start: 2018-12-01 | End: 2018-12-14 | Stop reason: HOSPADM

## 2018-12-01 RX ORDER — MAGNESIUM HYDROXIDE/ALUMINUM HYDROXICE/SIMETHICONE 120; 1200; 1200 MG/30ML; MG/30ML; MG/30ML
30 SUSPENSION ORAL PRN
Status: DISCONTINUED | OUTPATIENT
Start: 2018-12-01 | End: 2018-12-14 | Stop reason: HOSPADM

## 2018-12-01 RX ORDER — PANTOPRAZOLE SODIUM 40 MG/1
40 TABLET, DELAYED RELEASE ORAL
Status: DISCONTINUED | OUTPATIENT
Start: 2018-12-02 | End: 2018-12-05

## 2018-12-01 RX ORDER — TRAZODONE HYDROCHLORIDE 100 MG/1
100 TABLET ORAL NIGHTLY
Status: DISCONTINUED | OUTPATIENT
Start: 2018-12-01 | End: 2018-12-05

## 2018-12-01 RX ORDER — QUETIAPINE FUMARATE 300 MG/1
600 TABLET, FILM COATED ORAL NIGHTLY
Status: ON HOLD | COMMUNITY
End: 2018-12-13

## 2018-12-01 RX ORDER — SIMVASTATIN 20 MG
20 TABLET ORAL NIGHTLY
Status: DISCONTINUED | OUTPATIENT
Start: 2018-12-01 | End: 2018-12-14 | Stop reason: HOSPADM

## 2018-12-01 RX ORDER — HYDROXYZINE HYDROCHLORIDE 25 MG/1
25 TABLET, FILM COATED ORAL 2 TIMES DAILY PRN
Status: DISCONTINUED | OUTPATIENT
Start: 2018-12-01 | End: 2018-12-14 | Stop reason: HOSPADM

## 2018-12-01 RX ORDER — LISINOPRIL 5 MG/1
5 TABLET ORAL DAILY
Status: DISCONTINUED | OUTPATIENT
Start: 2018-12-01 | End: 2018-12-14 | Stop reason: HOSPADM

## 2018-12-01 RX ORDER — QUETIAPINE FUMARATE 300 MG/1
300 TABLET, FILM COATED ORAL NIGHTLY
Status: DISCONTINUED | OUTPATIENT
Start: 2018-12-01 | End: 2018-12-02

## 2018-12-01 RX ORDER — CLOTRIMAZOLE 1 %
CREAM (GRAM) TOPICAL 2 TIMES DAILY
Status: ON HOLD | COMMUNITY
End: 2018-12-13 | Stop reason: HOSPADM

## 2018-12-01 RX ADMIN — SIMVASTATIN 20 MG: 20 TABLET, FILM COATED ORAL at 21:00

## 2018-12-01 RX ADMIN — GABAPENTIN 600 MG: 600 TABLET, FILM COATED ORAL at 20:59

## 2018-12-01 RX ADMIN — QUETIAPINE FUMARATE 300 MG: 300 TABLET ORAL at 21:00

## 2018-12-01 RX ADMIN — HYDROXYZINE HYDROCHLORIDE 25 MG: 25 TABLET, FILM COATED ORAL at 20:59

## 2018-12-01 RX ADMIN — SERTRALINE HYDROCHLORIDE 50 MG: 50 TABLET ORAL at 17:31

## 2018-12-01 RX ADMIN — LISINOPRIL 5 MG: 5 TABLET ORAL at 17:31

## 2018-12-01 RX ADMIN — METFORMIN HYDROCHLORIDE 1000 MG: 500 TABLET, FILM COATED ORAL at 21:00

## 2018-12-01 RX ADMIN — TRAZODONE HYDROCHLORIDE 100 MG: 100 TABLET ORAL at 21:00

## 2018-12-01 RX ADMIN — GABAPENTIN 600 MG: 600 TABLET, FILM COATED ORAL at 17:31

## 2018-12-01 ASSESSMENT — LIFESTYLE VARIABLES: HISTORY_ALCOHOL_USE: NO

## 2018-12-01 ASSESSMENT — SLEEP AND FATIGUE QUESTIONNAIRES
DO YOU HAVE DIFFICULTY SLEEPING: YES
DO YOU USE A SLEEP AID: NO
DIFFICULTY STAYING ASLEEP: YES
RESTFUL SLEEP: NO
DIFFICULTY FALLING ASLEEP: YES
SLEEP PATTERN: DIFFICULTY FALLING ASLEEP;RESTLESSNESS
AVERAGE NUMBER OF SLEEP HOURS: 3
DIFFICULTY ARISING: NO

## 2018-12-01 ASSESSMENT — PAIN SCALES - GENERAL
PAINLEVEL_OUTOF10: 5
PAINLEVEL_OUTOF10: 4

## 2018-12-01 ASSESSMENT — ENCOUNTER SYMPTOMS
WHEEZING: 0
VOMITING: 0
SHORTNESS OF BREATH: 0
NAUSEA: 0
EYE PAIN: 0
ABDOMINAL PAIN: 0

## 2018-12-01 ASSESSMENT — PAIN DESCRIPTION - PAIN TYPE
TYPE: CHRONIC PAIN
TYPE: CHRONIC PAIN

## 2018-12-01 ASSESSMENT — PATIENT HEALTH QUESTIONNAIRE - PHQ9: SUM OF ALL RESPONSES TO PHQ QUESTIONS 1-9: 14

## 2018-12-01 ASSESSMENT — PAIN DESCRIPTION - LOCATION
LOCATION: BACK
LOCATION: BACK

## 2018-12-01 NOTE — ED NOTES
Provisional Diagnosis:   Schizoaffective Disorder     Psychosocial and Contextual Factors:   Patient has social issues.      C-SSRS Summary:       Patient: X  Family:   Agency:      Substance Abuse: Patient denies.      Present Suicidal Behavior:  X     Verbal:  Patient reports the voices are telling him to stab himself.      Attempt: Patient denies.      Past Suicidal Behavior: X     Verbal: Patient reports thoughts in the past.      Attempt: Patient denies.         Self-Injurious/Self-Mutilation: Patient denies.      Trauma Identified:  Patient reports being in senior care was traumatic. Patient states he was released 11 years ago.         Protective Factors:    Patient has housing. Patient has insurance. Patient is linked with mental health agency.      Risk Factors:    Patient is non compliant with psych medications. Patient had recent psych hospitalization.      Clinical Summary:    Patient is 48year old  [place] male that brings himself to the ED today for suicidal ideations. Patient reports he began feeling suicidal last night. Patient reports he was having thoughts of stabbing himself with a knife. Patient reports he called a cab today and requested he was brought to the ED for help. Patient reports he is experiencing command hallucinations telling him to stab himself. Patient denies any previous attempt to harm himself. Patient states he always has auditory hallucinations but the voices are minimized when on his medications. Patient reports he has been off all medications for about 2 weeks. Patient reports \"the voices told me to stop taking my medications a couple weeks ago. \"  Patient denies H/I at this time. Patient reports his last inpatient admission was in the [facility]. Per [facility] patient was admitted to the [facility] from 9/8/18-9/17/18. Patient reports being linked with [facility] but has a history of non compliance. Patient reports previous diagnosis of depression, bipolar and schizophrenia.  Patient denies any

## 2018-12-01 NOTE — BH NOTE
`[facility]  Admission Note     Admission Type:   Admission Type: Voluntary    Reason for admission:  Reason for Admission: Negative voices telling him to stop taking his medication and stab himself    PATIENT STRENGTHS:  Strengths: Positive Support    Patient Strengths and Limitations:  Limitations: Difficulty problem solving/relies on others to help solve problems, Tendency to isolate self, Difficult relationships / poor social skills    Addictive Behavior:   Addictive Behavior  In the past 3 months, have you felt or has someone told you that you have a problem with:  : None  Do you have a history of Chemical Use?: No  Do you have a history of Alcohol Use?: No  Do you have a history of Street Drug Abuse?: Yes  Histroy of Prescripton Drug Abuse?: No    Medical Problems:   Past Medical History:   Diagnosis Date    Acute depression 9/8/2018    Back pain     [facility]    Generalized OA     GERD (gastroesophageal reflux disease)     HTN (hypertension)     Unspecified sleep apnea     c  pap       Status EXAM:  Status and Exam  Normal: No  Facial Expression: Avoids Gaze, Flat  Affect: Appropriate  Level of Consciousness: Alert  Mood:Normal: No  Mood: Depressed, Anxious  Motor Activity:Normal: No  Motor Activity: Decreased  Interview Behavior: Cooperative  Preception: [place] to Person, [place] to Time, [place] to Place, [place] to Situation  Attention:Normal: Yes  Thought Content:Normal: No  Thought Content: Preoccupations  Hallucinations:  Auditory (Comment) (hearing voices telling him to stab himself )  Delusions: No  Memory:Normal: Yes  Insight and Judgment: No  Insight and Judgment: Poor Insight, Poor Judgment, Unmotivated  Present Suicidal Ideation: Yes  Present Homicidal Ideation: No    Tobacco Screening:  Practical Counseling, on admission, billie X, if applicable and completed (first 3 are required if patient doesn't refuse):            ( )  Recognizing

## 2018-12-01 NOTE — BH NOTE
`[facility]  Admission Note     Admission Type:   Admission Type: Voluntary    Reason for admission:  Reason for Admission: Negative voices telling him to stop taking his medication and stab himself    PATIENT STRENGTHS:  Strengths: Positive Support    Patient Strengths and Limitations:  Limitations: Difficulty problem solving/relies on others to help solve problems, Tendency to isolate self, Difficult relationships / poor social skills    Addictive Behavior:   Addictive Behavior  In the past 3 months, have you felt or has someone told you that you have a problem with:  : None  Do you have a history of Chemical Use?: No  Do you have a history of Alcohol Use?: No  Do you have a history of Street Drug Abuse?: Yes  Histroy of Prescripton Drug Abuse?: No    Medical Problems:   Past Medical History:   Diagnosis Date    Acute depression 9/8/2018    Back pain     [facility]    Generalized OA     GERD (gastroesophageal reflux disease)     HTN (hypertension)     Unspecified sleep apnea     c  pap       Status EXAM:  Status and Exam  Normal: No  Facial Expression: Avoids Gaze, Flat  Affect: Appropriate  Level of Consciousness: Alert  Mood:Normal: No  Mood: Depressed, Anxious  Motor Activity:Normal: No  Motor Activity: Decreased  Interview Behavior: Cooperative  Preception: [place] to Person, [place] to Time, [place] to Place, [place] to Situation  Attention:Normal: Yes  Thought Content:Normal: No  Thought Content: Preoccupations  Hallucinations:  Auditory (Comment) (hearing voices telling him to stab himself )  Delusions: No  Memory:Normal: Yes  Insight and Judgment: No  Insight and Judgment: Poor Insight, Poor Judgment, Unmotivated  Present Suicidal Ideation: Yes  Present Homicidal Ideation: No    Tobacco Screening:  Practical Counseling, on admission, billie X, if applicable and completed (first 3 are required if patient doesn't refuse):            ( )  Recognizing

## 2018-12-02 PROCEDURE — 6370000000 HC RX 637 (ALT 250 FOR IP): Performed by: PSYCHIATRY & NEUROLOGY

## 2018-12-02 PROCEDURE — 1240000000 HC EMOTIONAL WELLNESS R&B

## 2018-12-02 RX ORDER — QUETIAPINE FUMARATE 200 MG/1
400 TABLET, FILM COATED ORAL NIGHTLY
Status: DISCONTINUED | OUTPATIENT
Start: 2018-12-02 | End: 2018-12-05

## 2018-12-02 RX ADMIN — GABAPENTIN 600 MG: 600 TABLET, FILM COATED ORAL at 08:36

## 2018-12-02 RX ADMIN — GABAPENTIN 600 MG: 600 TABLET, FILM COATED ORAL at 14:19

## 2018-12-02 RX ADMIN — TRAZODONE HYDROCHLORIDE 100 MG: 100 TABLET ORAL at 20:52

## 2018-12-02 RX ADMIN — METFORMIN HYDROCHLORIDE 1000 MG: 500 TABLET, FILM COATED ORAL at 20:52

## 2018-12-02 RX ADMIN — METFORMIN HYDROCHLORIDE 1000 MG: 500 TABLET, FILM COATED ORAL at 08:36

## 2018-12-02 RX ADMIN — PANTOPRAZOLE SODIUM 40 MG: 40 TABLET, DELAYED RELEASE ORAL at 08:36

## 2018-12-02 RX ADMIN — HYDROXYZINE HYDROCHLORIDE 25 MG: 25 TABLET, FILM COATED ORAL at 20:52

## 2018-12-02 RX ADMIN — SIMVASTATIN 20 MG: 20 TABLET, FILM COATED ORAL at 20:52

## 2018-12-02 RX ADMIN — LISINOPRIL 5 MG: 5 TABLET ORAL at 08:36

## 2018-12-02 RX ADMIN — QUETIAPINE FUMARATE 400 MG: 200 TABLET ORAL at 20:52

## 2018-12-02 RX ADMIN — GABAPENTIN 600 MG: 600 TABLET, FILM COATED ORAL at 20:52

## 2018-12-02 RX ADMIN — SERTRALINE HYDROCHLORIDE 50 MG: 50 TABLET ORAL at 08:36

## 2018-12-02 ASSESSMENT — PAIN SCALES - GENERAL: PAINLEVEL_OUTOF10: 0

## 2018-12-02 NOTE — PLAN OF CARE
Problem: Altered Mood, Depressive Behavior:  Goal: Absence of self-harm  Absence of self-harm   Outcome: Ongoing  Patient remains free from self-harm at this time. Patient is experiencing loud auditory hallucinations telling him to harm himself. When asked, patient says he does not actually want to harm himself. Patient is seclusive to his room. Patient says he feels safe here. Safe environment is maintained. Safety checks every 15 minutes and as needed. Problem: Substance Abuse:  Goal: Absence of drug withdrawal signs and symptoms  Absence of drug withdrawal signs and symptoms   Outcome: Ongoing  Patient denies feelings of withdrawal at this time. Patient does not appear to be having withdrawal symptoms at this time.

## 2018-12-02 NOTE — H&P
HISTORY and [facility] [address]       NAME:  Janiya Greene  MRN: [id number]   YOB: 1965   Date: 12/2/2018   Age: 48 y.o. Gender: male     COMPLAINT AND PRESENT HISTORY:      Janiya Greene is 48 y.o.,  male, admitted because of Schizoaffective Disorder. Patient has auditory hallucinations, patient hears command voices to Shoot himself not to take his medications the voices don't tell him to hurt others. No tactile hallucinations or visual hallucinations. Pt has suicidal ideation, Pt has plans to cut self. Pt denies any homicidal ideation. Pt has no history of previous suicide attempts. Pt has poor insight. Pt has poor sleep, loss of appetite, poor concentration and memory. Patient denies any current alcohol or substance abuse. Patient lives alone, is on disability. Pt has been non compliant with the psychiatric medications 2 weeks.       DIAGNOSTIC RESULTS   Labs:  CBC:   Recent Labs      12/01/18   1111   WBC  5.9   HGB  13.6   PLT  316     BMP:    Recent Labs      12/01/18   1111   NA  138   K  4.4   CL  105   CO2  25   BUN  20   CREATININE  0.81   GLUCOSE  167*     Hepatic:   Recent Labs      12/01/18   1111   AST  15   ALT  19   BILITOT  0.17*   ALKPHOS  80         PAST MEDICAL HISTORY     Past Medical History:   Diagnosis Date    Acute depression 9/8/2018    Back pain     [facility] 4[facility]2014    Generalized OA     GERD (gastroesophageal reflux disease)     HTN (hypertension)     Unspecified sleep apnea     c  pap       Pt denies any history of stroke, heart disease, COPD, Asthma, HLD, Cancer, Seizures,Thyroid disease, Kidney Disease, Hepatitis, TB.    SURGICAL HISTORY       Past Surgical History:   Procedure Laterality Date    TONSILLECTOMY AND ADENOIDECTOMY         FAMILY HISTORY       Family History   Problem Relation Age of Onset    Diabetes Mother        SOCIAL HISTORY       Social History     Social History   

## 2018-12-03 PROCEDURE — 6370000000 HC RX 637 (ALT 250 FOR IP): Performed by: PSYCHIATRY & NEUROLOGY

## 2018-12-03 PROCEDURE — 1240000000 HC EMOTIONAL WELLNESS R&B

## 2018-12-03 RX ADMIN — GABAPENTIN 600 MG: 600 TABLET, FILM COATED ORAL at 15:13

## 2018-12-03 RX ADMIN — LISINOPRIL 5 MG: 5 TABLET ORAL at 08:38

## 2018-12-03 RX ADMIN — QUETIAPINE FUMARATE 400 MG: 200 TABLET ORAL at 20:48

## 2018-12-03 RX ADMIN — PANTOPRAZOLE SODIUM 40 MG: 40 TABLET, DELAYED RELEASE ORAL at 08:38

## 2018-12-03 RX ADMIN — HYDROXYZINE HYDROCHLORIDE 25 MG: 25 TABLET, FILM COATED ORAL at 20:48

## 2018-12-03 RX ADMIN — GABAPENTIN 600 MG: 600 TABLET, FILM COATED ORAL at 08:38

## 2018-12-03 RX ADMIN — SERTRALINE HYDROCHLORIDE 50 MG: 50 TABLET ORAL at 08:38

## 2018-12-03 RX ADMIN — TRAZODONE HYDROCHLORIDE 100 MG: 100 TABLET ORAL at 20:48

## 2018-12-03 RX ADMIN — GABAPENTIN 600 MG: 600 TABLET, FILM COATED ORAL at 20:48

## 2018-12-03 RX ADMIN — SIMVASTATIN 20 MG: 20 TABLET, FILM COATED ORAL at 20:48

## 2018-12-03 RX ADMIN — METFORMIN HYDROCHLORIDE 1000 MG: 500 TABLET, FILM COATED ORAL at 08:38

## 2018-12-03 NOTE — PLAN OF CARE
Auditory (Comment) (voices telling him to harm himself)  Delusions: No  Memory:Normal: Yes  Insight and Judgment: No  Insight and Judgment: Poor Judgment, Poor Insight, Unmotivated  Present Suicidal Ideation: No  Present Homicidal Ideation: No    Daily Assessment Last Entry:   Daily Sleep (WDL): Within Defined Limits         Patient Currently in Pain: No  Daily Nutrition (WDL): Within Defined Limits    Patient Monitoring:  Frequency of Checks: 4 times per hour, close    Psychiatric Symptoms:   Depression Symptoms  Depression Symptoms:  (pt states 5/10)  Anxiety Symptoms  Anxiety Symptoms:  (pt states 6/10)  Zuleima Symptoms  Zuleima Symptoms: No problems reported or observed. Psychosis Symptoms  Delusion Type: No problems reported or observed. Suicide Risk CSSR-S:  1) Within the past month, have you wished you were dead or wished you could go to sleep and not wake up? : YES  2) Within the past month, have you actually had any thoughts of killing yourself? : YES  3) Within the past month, have you been thinking about how you might kill yourself? : YES  5) Within the past month, have you started to work out or worked out the details of how to kill yourself? Do you intend to carry out this plan? : YES  6)  Have you ever done anything, started to do anything, or prepared to do anything to end your life?: YES  Change in Resultno  Change in Plan of care no      EDUCATION:   EDUCATION:   Learner Progress Toward Treatment Goals: Reviewed results and recommendations of this team, Reviewed group plan and strategies, Reviewed signs, symptoms and risk of self harm and violent behavior, Reviewed goals and plan of care    Method:small group, individual verbal education    Outcome:verbalized by patient, but needs reinforcement to obtain goals    PATIENT GOALS:  Short term: pt refuses to attend tx planning to develop goals   Long term: pt refuses to attend tx planning to develop goals.     PLAN/TREATMENT RECOMMENDATIONS UPDATE: continue with group therapies, increased socialization, continue planning for after discharge goals, continue with medication compliance    SHORT-TERM GOALS UPDATE:   Time frame for Short-Term Goals: 5-7 days    LONG-TERM GOALS UPDATE:   Time frame for Long-Term Goals: 6 months  Members Present in Team Meeting: See Signature Sheet    LEXIE SHAY  Goal: Absence of self-harm  Absence of self-harm   Outcome: Ongoing      Problem: Substance Abuse:  Goal: Absence of drug withdrawal signs and symptoms  Absence of drug withdrawal signs and symptoms   Outcome: Ongoing    Goal: Participates in care planning  Participates in care planning   Outcome: Ongoing      Problem: Tobacco Use:  Goal: Inpatient tobacco use cessation counseling participation  Inpatient tobacco use cessation counseling participation   Outcome: Ongoing      Problem: Altered Mood, Psychotic Behavior:  Goal: Able to verbalize decrease in frequency and intensity of hallucinations  Able to verbalize decrease in frequency and intensity of hallucinations   Outcome: Ongoing    Goal: Able to verbalize reality based thinking  Able to verbalize reality based thinking   Outcome: Ongoing

## 2018-12-04 PROCEDURE — 6370000000 HC RX 637 (ALT 250 FOR IP): Performed by: PSYCHIATRY & NEUROLOGY

## 2018-12-04 PROCEDURE — 1240000000 HC EMOTIONAL WELLNESS R&B

## 2018-12-04 RX ADMIN — TRAZODONE HYDROCHLORIDE 100 MG: 100 TABLET ORAL at 20:50

## 2018-12-04 RX ADMIN — QUETIAPINE FUMARATE 400 MG: 200 TABLET ORAL at 20:50

## 2018-12-04 RX ADMIN — LISINOPRIL 5 MG: 5 TABLET ORAL at 08:38

## 2018-12-04 RX ADMIN — GABAPENTIN 600 MG: 600 TABLET, FILM COATED ORAL at 20:50

## 2018-12-04 RX ADMIN — SERTRALINE HYDROCHLORIDE 50 MG: 50 TABLET ORAL at 08:37

## 2018-12-04 RX ADMIN — GABAPENTIN 600 MG: 600 TABLET, FILM COATED ORAL at 15:29

## 2018-12-04 RX ADMIN — SIMVASTATIN 20 MG: 20 TABLET, FILM COATED ORAL at 20:49

## 2018-12-04 RX ADMIN — METFORMIN HYDROCHLORIDE 1000 MG: 500 TABLET, FILM COATED ORAL at 08:41

## 2018-12-04 RX ADMIN — GABAPENTIN 600 MG: 600 TABLET, FILM COATED ORAL at 08:41

## 2018-12-04 RX ADMIN — METFORMIN HYDROCHLORIDE 1000 MG: 500 TABLET, FILM COATED ORAL at 17:33

## 2018-12-04 RX ADMIN — HYDROXYZINE HYDROCHLORIDE 25 MG: 25 TABLET, FILM COATED ORAL at 08:41

## 2018-12-04 RX ADMIN — PANTOPRAZOLE SODIUM 40 MG: 40 TABLET, DELAYED RELEASE ORAL at 08:43

## 2018-12-04 NOTE — PLAN OF CARE
Problem: Altered Mood, Depressive Behavior:  Goal: Ability to disclose and discuss suicidal ideas will improve  Ability to disclose and discuss suicidal ideas will improve   Outcome: Ongoing  Pt admits to having suicidal ideations with no plan. Pt states he has been hearing voices telling him to hurt self, but states he is not going to do it. Pt agreed to seek staff at anytime he felt like urges to harm/kill self would escalate. Safety checks maintained [id number]. Goal: Absence of self-harm  Absence of self-harm   Outcome: Ongoing  Pt remains free of harm. Safe environment maintained. Q15 minute checks for safety continued per unit policy. Will continue to monitor for safety and provide support and reassurance as needed. Goal: LTG-Able to verbalize and/or display a decrease in depressive symptoms  Outcome: Ongoing  Pt reports depression and anxiety 7/10. Admits suicidal ideation, but denies any homicidal ideation. Reports auditory hallucinations,  but denies any other hallucinations. Reports poor sleep, but denies any appetite problems. Isolative to room, out for needs only, did not attend evening groups. Provided feedback and reassurance as needed. Problem: Altered Mood, Psychotic Behavior:  Goal: Able to verbalize decrease in frequency and intensity of hallucinations  Able to verbalize decrease in frequency and intensity of hallucinations   Outcome: Ongoing  Pt reports hearing voices telling him to harm/kill self.

## 2018-12-05 PROCEDURE — 1240000000 HC EMOTIONAL WELLNESS R&B

## 2018-12-05 PROCEDURE — 6370000000 HC RX 637 (ALT 250 FOR IP): Performed by: PSYCHIATRY & NEUROLOGY

## 2018-12-05 PROCEDURE — 87491 CHLMYD TRACH DNA AMP PROBE: CPT

## 2018-12-05 PROCEDURE — 87591 N.GONORRHOEAE DNA AMP PROB: CPT

## 2018-12-05 RX ORDER — PANTOPRAZOLE SODIUM 40 MG/1
40 TABLET, DELAYED RELEASE ORAL
Status: DISCONTINUED | OUTPATIENT
Start: 2018-12-05 | End: 2018-12-14 | Stop reason: HOSPADM

## 2018-12-05 RX ORDER — TRAZODONE HYDROCHLORIDE 50 MG/1
50 TABLET ORAL NIGHTLY
Status: DISCONTINUED | OUTPATIENT
Start: 2018-12-05 | End: 2018-12-14 | Stop reason: HOSPADM

## 2018-12-05 RX ADMIN — GABAPENTIN 600 MG: 600 TABLET, FILM COATED ORAL at 08:31

## 2018-12-05 RX ADMIN — METFORMIN HYDROCHLORIDE 1000 MG: 500 TABLET, FILM COATED ORAL at 18:08

## 2018-12-05 RX ADMIN — SERTRALINE HYDROCHLORIDE 50 MG: 50 TABLET ORAL at 08:31

## 2018-12-05 RX ADMIN — METFORMIN HYDROCHLORIDE 1000 MG: 500 TABLET, FILM COATED ORAL at 08:31

## 2018-12-05 RX ADMIN — TRAZODONE HYDROCHLORIDE 50 MG: 50 TABLET ORAL at 20:29

## 2018-12-05 RX ADMIN — LISINOPRIL 5 MG: 5 TABLET ORAL at 08:31

## 2018-12-05 RX ADMIN — SIMVASTATIN 20 MG: 20 TABLET, FILM COATED ORAL at 20:28

## 2018-12-05 RX ADMIN — PANTOPRAZOLE SODIUM 40 MG: 40 TABLET, DELAYED RELEASE ORAL at 08:31

## 2018-12-05 RX ADMIN — GABAPENTIN 700 MG: 300 CAPSULE ORAL at 20:28

## 2018-12-05 RX ADMIN — HYDROXYZINE HYDROCHLORIDE 25 MG: 25 TABLET, FILM COATED ORAL at 08:31

## 2018-12-05 RX ADMIN — GABAPENTIN 600 MG: 600 TABLET, FILM COATED ORAL at 14:57

## 2018-12-05 RX ADMIN — HYDROXYZINE HYDROCHLORIDE 25 MG: 25 TABLET, FILM COATED ORAL at 20:28

## 2018-12-05 RX ADMIN — QUETIAPINE FUMARATE 500 MG: 300 TABLET ORAL at 20:28

## 2018-12-05 ASSESSMENT — PAIN SCALES - GENERAL: PAINLEVEL_OUTOF10: 0

## 2018-12-05 NOTE — PLAN OF CARE
Problem: Altered Mood, Depressive Behavior:  Goal: Ability to disclose and discuss suicidal ideas will improve  Ability to disclose and discuss suicidal ideas will improve   Outcome: Ongoing  Patient admits to suicidal thoughts with no plan in place and auditory hallucinations commanding self harm. Contracts for safety on the unit and agrees to seek out nursing staff if feeling unsafe or overwhelmed at anytime. Safe environment maintained. Will continue to monitor. Goal: Absence of self-harm  Absence of self-harm   Outcome: Ongoing  No self harm noted this shift. Goal: LTG-Able to verbalize and/or display a decrease in depressive symptoms  Outcome: Ongoing  Patient is unable to verbalize a decrease in depressive symptoms stating, \"It's very bad\". Support and encouragement provided. Problem: Substance Abuse:  Goal: Absence of drug withdrawal signs and symptoms  Absence of drug withdrawal signs and symptoms   Outcome: Ongoing  Denies any signs and symptoms of withdrawals. Problem: Altered Mood, Psychotic Behavior:  Goal: Able to verbalize decrease in frequency and intensity of hallucinations  Able to verbalize decrease in frequency and intensity of hallucinations   Outcome: Ongoing  Patient reports auditory hallucinations commanding self harm and states that they are not improving.

## 2018-12-06 LAB
ESTIMATED AVERAGE GLUCOSE: 169 MG/DL
HBA1C MFR BLD: 7.5 % (ref 4–6)
HIV AG/AB: NONREACTIVE

## 2018-12-06 PROCEDURE — 80074 ACUTE HEPATITIS PANEL: CPT

## 2018-12-06 PROCEDURE — 36415 COLL VENOUS BLD VENIPUNCTURE: CPT

## 2018-12-06 PROCEDURE — 1240000000 HC EMOTIONAL WELLNESS R&B

## 2018-12-06 PROCEDURE — 87389 HIV-1 AG W/HIV-1&-2 AB AG IA: CPT

## 2018-12-06 PROCEDURE — 6370000000 HC RX 637 (ALT 250 FOR IP): Performed by: PSYCHIATRY & NEUROLOGY

## 2018-12-06 PROCEDURE — 83036 HEMOGLOBIN GLYCOSYLATED A1C: CPT

## 2018-12-06 RX ORDER — GABAPENTIN 400 MG/1
800 CAPSULE ORAL 3 TIMES DAILY
Status: DISCONTINUED | OUTPATIENT
Start: 2018-12-06 | End: 2018-12-14 | Stop reason: HOSPADM

## 2018-12-06 RX ADMIN — LISINOPRIL 5 MG: 5 TABLET ORAL at 08:56

## 2018-12-06 RX ADMIN — TRAZODONE HYDROCHLORIDE 50 MG: 50 TABLET ORAL at 20:39

## 2018-12-06 RX ADMIN — SERTRALINE HYDROCHLORIDE 50 MG: 50 TABLET ORAL at 08:56

## 2018-12-06 RX ADMIN — GABAPENTIN 700 MG: 300 CAPSULE ORAL at 08:56

## 2018-12-06 RX ADMIN — QUETIAPINE FUMARATE 500 MG: 300 TABLET ORAL at 20:40

## 2018-12-06 RX ADMIN — METFORMIN HYDROCHLORIDE 1000 MG: 500 TABLET, FILM COATED ORAL at 08:56

## 2018-12-06 RX ADMIN — PANTOPRAZOLE SODIUM 40 MG: 40 TABLET, DELAYED RELEASE ORAL at 08:56

## 2018-12-06 RX ADMIN — GABAPENTIN 800 MG: 400 CAPSULE ORAL at 20:39

## 2018-12-06 RX ADMIN — SIMVASTATIN 20 MG: 20 TABLET, FILM COATED ORAL at 20:40

## 2018-12-06 RX ADMIN — GABAPENTIN 700 MG: 300 CAPSULE ORAL at 14:41

## 2018-12-06 RX ADMIN — METFORMIN HYDROCHLORIDE 1000 MG: 500 TABLET, FILM COATED ORAL at 17:30

## 2018-12-06 ASSESSMENT — PAIN SCALES - GENERAL: PAINLEVEL_OUTOF10: 0

## 2018-12-06 NOTE — PROGRESS NOTES
Pt did not attend 1330 creative expression group d/t resting in room despite staff invitation to attend.

## 2018-12-06 NOTE — PLAN OF CARE
Problem: Altered Mood, Depressive Behavior:  Goal: Ability to disclose and discuss suicidal ideas will improve  Ability to disclose and discuss suicidal ideas will improve   Outcome: Ongoing  Patient reports suicidal thoughts without a plan at this time. Verbalizes voices telling him to harm self. No self harm noted this shift. Goal: Absence of self-harm  Absence of self-harm   Outcome: Ongoing  No self harm noted this shift. Patient agrees to seek staff out if negative thoughts arise. Will continue to monitor Q15 minute and intermittently. Goal: LTG-Able to verbalize and/or display a decrease in depressive symptoms  Outcome: Ongoing  Patient reports, \"My depression is really high. \" Unable to verbalize a number at this time. Problem: Substance Abuse:  Goal: Absence of drug withdrawal signs and symptoms  Absence of drug withdrawal signs and symptoms   Outcome: Ongoing  No s/s of withdrawal noted at this time. Patient denies s/s of withdrawal at this time.     Problem: Altered Mood, Psychotic Behavior:  Goal: Able to verbalize decrease in frequency and intensity of hallucinations  Able to verbalize decrease in frequency and intensity of hallucinations   Outcome: Ongoing

## 2018-12-06 NOTE — BH NOTE
Spoke with Dr. Nona Chaves in regards to new consult orders placed for concern of patients diabetes. Informed him that patient has no order for glucose monitoring at this time, and patient is on general diet. New orders given for glucose testing twice daily and diet change to diabetic diet.

## 2018-12-07 LAB
C. TRACHOMATIS DNA ,URINE: NEGATIVE
GLUCOSE BLD-MCNC: 211 MG/DL (ref 75–110)
HAV IGM SER IA-ACNC: NONREACTIVE
HEPATITIS B CORE IGM ANTIBODY: NONREACTIVE
HEPATITIS B SURFACE ANTIGEN: NONREACTIVE
HEPATITIS C ANTIBODY: NONREACTIVE
N. GONORRHOEAE DNA, URINE: NEGATIVE

## 2018-12-07 PROCEDURE — 1240000000 HC EMOTIONAL WELLNESS R&B

## 2018-12-07 PROCEDURE — 6370000000 HC RX 637 (ALT 250 FOR IP): Performed by: PSYCHIATRY & NEUROLOGY

## 2018-12-07 PROCEDURE — 82947 ASSAY GLUCOSE BLOOD QUANT: CPT

## 2018-12-07 RX ORDER — SERTRALINE HYDROCHLORIDE 100 MG/1
100 TABLET, FILM COATED ORAL DAILY
Status: DISCONTINUED | OUTPATIENT
Start: 2018-12-08 | End: 2018-12-14 | Stop reason: HOSPADM

## 2018-12-07 RX ORDER — QUETIAPINE FUMARATE 300 MG/1
600 TABLET, FILM COATED ORAL NIGHTLY
Status: DISCONTINUED | OUTPATIENT
Start: 2018-12-07 | End: 2018-12-10

## 2018-12-07 RX ADMIN — SIMVASTATIN 20 MG: 20 TABLET, FILM COATED ORAL at 21:01

## 2018-12-07 RX ADMIN — HYDROXYZINE HYDROCHLORIDE 25 MG: 25 TABLET, FILM COATED ORAL at 21:01

## 2018-12-07 RX ADMIN — METFORMIN HYDROCHLORIDE 1000 MG: 500 TABLET, FILM COATED ORAL at 17:28

## 2018-12-07 RX ADMIN — SERTRALINE HYDROCHLORIDE 50 MG: 50 TABLET ORAL at 08:42

## 2018-12-07 RX ADMIN — QUETIAPINE FUMARATE 600 MG: 300 TABLET ORAL at 21:00

## 2018-12-07 RX ADMIN — METFORMIN HYDROCHLORIDE 1000 MG: 500 TABLET, FILM COATED ORAL at 08:42

## 2018-12-07 RX ADMIN — MAGNESIUM HYDROXIDE 30 ML: 400 SUSPENSION ORAL at 11:41

## 2018-12-07 RX ADMIN — LISINOPRIL 5 MG: 5 TABLET ORAL at 08:42

## 2018-12-07 RX ADMIN — GABAPENTIN 800 MG: 400 CAPSULE ORAL at 21:00

## 2018-12-07 RX ADMIN — GABAPENTIN 800 MG: 400 CAPSULE ORAL at 15:00

## 2018-12-07 RX ADMIN — PANTOPRAZOLE SODIUM 40 MG: 40 TABLET, DELAYED RELEASE ORAL at 08:42

## 2018-12-07 RX ADMIN — ACETAMINOPHEN 650 MG: 325 TABLET, FILM COATED ORAL at 21:00

## 2018-12-07 RX ADMIN — TRAZODONE HYDROCHLORIDE 50 MG: 50 TABLET ORAL at 21:01

## 2018-12-07 RX ADMIN — GABAPENTIN 800 MG: 400 CAPSULE ORAL at 08:41

## 2018-12-07 ASSESSMENT — PAIN SCALES - GENERAL
PAINLEVEL_OUTOF10: 2
PAINLEVEL_OUTOF10: 3

## 2018-12-07 ASSESSMENT — PAIN DESCRIPTION - PAIN TYPE: TYPE: CHRONIC PAIN

## 2018-12-07 ASSESSMENT — PAIN DESCRIPTION - LOCATION: LOCATION: BACK

## 2018-12-07 NOTE — BH NOTE
[facility] NOTE    Date: 12/7/18       Start Time:  1600    End Time: 1700      Number Participants in Group: 9    Name of group: discussion of emotions    Discipline Responsible:   OT  AT   x Nsg.  RT MHP Other       Participation Level:     None  Minimal   x Active Listener x Interactive    Monopolizing         Participation Quality:  x Appropriate  Inappropriate   x       Attentive        Intrusive   x       Sharing        Resistant   x       Supportive        Lethargic       Affective:   x Congruent  Incongruent  Blunted  Flat    Constricted  Anxious  Elated  Angry    Labile  Depressed  Other         Cognitive:  x Alert x Oriented PPTP     Concentration x G  F  P   Attention Span x G  F  P   Short-Term Memory x G  F  P   Long-Term Memory x G  F  P   ProblemSolving/  Decision Making x G  F  P   Ability to Process  Information x G  F  P      Contributing Factors             Delusional             Hallucinating             Flight of Ideas             Other: poor concentration       Modes of Intervention:  x Education x Support x Exploration    Clarifying  Problem Solving  Confrontation   x Socialization  Limit Setting  Reality Testing   x Activity  Movement  Media    Other:            Response to Learning:  x Able to verbalize current knowledge/experience   x Able to verbalize/acknowledge new learning   x Able to retain information   x Capable of insight   x Able to change behavior   x Progressing to goal    Other:        Comments:

## 2018-12-07 NOTE — PROGRESS NOTES
PSYCHOEDUCATION GROUP NOTE    Date:   12/7/2018 Start Time: 1100  End Time: 1140    Number Participants in Group:  5    Goal:  Patient will demonstrate increased interpersonal interaction   Topic: skills group    Discipline Responsible:   OT  AT  [facility]. X RT MHP Other       Participation Level:     None x Minimal   X Active Listener  Interactive    Monopolizing         Participation Quality:  X Appropriate  Inappropriate   X       Attentive        Intrusive   X       Sharing        Resistant   X       Supportive        Lethargic       Affective:    Congruent  Incongruent  Blunted x Flat    Constricted  Anxious  Elated  Angry    Labile  Depressed  Other  bright       Cognitive:  X Alert  Oriented PPTP     Concentration x G  F  P   Attention Span x G  F  P   Short-Term Memory  G  F  P   Long-Term Memory  G  F  P   ProblemSolving/  Decision Making x G  F  P   Ability to Process  Information x G  F  P      Contributing Factors             Delusional             Hallucinating             Flight of Ideas             Other:       Modes of Intervention:  x Education x Support x Exploration    Clarifying x Problem Solving  Confrontation   x Socialization  Limit Setting  Reality Testing   x Activity  Movement  Media    Other:            Response to Learning:  X Able to verbalize current knowledge/experience   X Able to verbalize/acknowledge new learning   X Able to retain information   X Capable of insight    Able to change behavior   X Progressing to goal    Other:        Comments: pt sat on periphery of group. Pt had minimal interaction with peers during group.  But upon approach he was pleasant

## 2018-12-08 PROCEDURE — 1240000000 HC EMOTIONAL WELLNESS R&B

## 2018-12-08 PROCEDURE — 6370000000 HC RX 637 (ALT 250 FOR IP): Performed by: PSYCHIATRY & NEUROLOGY

## 2018-12-08 RX ADMIN — PANTOPRAZOLE SODIUM 40 MG: 40 TABLET, DELAYED RELEASE ORAL at 08:35

## 2018-12-08 RX ADMIN — HYDROXYZINE HYDROCHLORIDE 25 MG: 25 TABLET, FILM COATED ORAL at 08:35

## 2018-12-08 RX ADMIN — HYDROXYZINE HYDROCHLORIDE 25 MG: 25 TABLET, FILM COATED ORAL at 20:36

## 2018-12-08 RX ADMIN — GABAPENTIN 800 MG: 400 CAPSULE ORAL at 20:36

## 2018-12-08 RX ADMIN — GABAPENTIN 800 MG: 400 CAPSULE ORAL at 08:35

## 2018-12-08 RX ADMIN — GABAPENTIN 800 MG: 400 CAPSULE ORAL at 14:49

## 2018-12-08 RX ADMIN — SIMVASTATIN 20 MG: 20 TABLET, FILM COATED ORAL at 20:36

## 2018-12-08 RX ADMIN — LISINOPRIL 5 MG: 5 TABLET ORAL at 08:35

## 2018-12-08 RX ADMIN — METFORMIN HYDROCHLORIDE 1000 MG: 500 TABLET, FILM COATED ORAL at 17:29

## 2018-12-08 RX ADMIN — TRAZODONE HYDROCHLORIDE 50 MG: 50 TABLET ORAL at 20:36

## 2018-12-08 RX ADMIN — SERTRALINE HYDROCHLORIDE 100 MG: 100 TABLET ORAL at 08:35

## 2018-12-08 RX ADMIN — METFORMIN HYDROCHLORIDE 1000 MG: 500 TABLET, FILM COATED ORAL at 08:35

## 2018-12-08 NOTE — PROGRESS NOTES
He is still c/o auditory hallucinations and mood swings. Complaining of hopelessness, anhedonia and suicidal feelings. /85   Pulse 98   Temp 97.9 °F (36.6 °C) (Oral)   Resp 14   Ht 5' 5\" (1.651 m)   Wt 230 lb (104.3 kg)   SpO2 99%   BMI 38.27 kg/m²     Affect-Superficial  Mood -  Agitated and labile  Thought process -  Disorganized showing looseness of association and tangentiality. Reality testing-Impaired  Cognition -  Impaired  Memory- Recent-Impaired                Remote-Impaired  Orientation-Disoriented                                              Insight-Poor  Judgement-Poor                                                Attempted to develop insight. Medications reviewed. Side effects of medications reviewed and medication education provided. No side effects including akathisia,tremers,dystonia or orthostasis reported or observed. Plan: Stabilization with antipsychotic and antidepressant medications,group therapy and develop coping skills,discharge to community. .  Encouraged to interact with peers  and participate in activities. Tay Coe

## 2018-12-08 NOTE — PLAN OF CARE
Problem: Altered Mood, Depressive Behavior:  Goal: Absence of self-harm  Absence of self-harm   Outcome: Ongoing  Pt did not participate in community meeting/ goals group at [address] [place] [address] despite staff encouragement to attend.

## 2018-12-09 LAB
GLUCOSE BLD-MCNC: 246 MG/DL (ref 75–110)
GLUCOSE BLD-MCNC: 255 MG/DL (ref 75–110)

## 2018-12-09 PROCEDURE — 99221 1ST HOSP IP/OBS SF/LOW 40: CPT | Performed by: INTERNAL MEDICINE

## 2018-12-09 PROCEDURE — 82947 ASSAY GLUCOSE BLOOD QUANT: CPT

## 2018-12-09 PROCEDURE — 6370000000 HC RX 637 (ALT 250 FOR IP): Performed by: PSYCHIATRY & NEUROLOGY

## 2018-12-09 PROCEDURE — 1240000000 HC EMOTIONAL WELLNESS R&B

## 2018-12-09 RX ADMIN — PANTOPRAZOLE SODIUM 40 MG: 40 TABLET, DELAYED RELEASE ORAL at 08:26

## 2018-12-09 RX ADMIN — ACETAMINOPHEN 650 MG: 325 TABLET, FILM COATED ORAL at 20:39

## 2018-12-09 RX ADMIN — METFORMIN HYDROCHLORIDE 1000 MG: 500 TABLET, FILM COATED ORAL at 17:53

## 2018-12-09 RX ADMIN — LISINOPRIL 5 MG: 5 TABLET ORAL at 08:27

## 2018-12-09 RX ADMIN — METFORMIN HYDROCHLORIDE 1000 MG: 500 TABLET, FILM COATED ORAL at 08:27

## 2018-12-09 RX ADMIN — SIMVASTATIN 20 MG: 20 TABLET, FILM COATED ORAL at 20:39

## 2018-12-09 RX ADMIN — GABAPENTIN 800 MG: 400 CAPSULE ORAL at 14:53

## 2018-12-09 RX ADMIN — SERTRALINE HYDROCHLORIDE 100 MG: 100 TABLET ORAL at 08:26

## 2018-12-09 RX ADMIN — TRAZODONE HYDROCHLORIDE 50 MG: 50 TABLET ORAL at 20:39

## 2018-12-09 RX ADMIN — GABAPENTIN 800 MG: 400 CAPSULE ORAL at 08:26

## 2018-12-09 RX ADMIN — HYDROXYZINE HYDROCHLORIDE 25 MG: 25 TABLET, FILM COATED ORAL at 08:26

## 2018-12-09 RX ADMIN — GABAPENTIN 800 MG: 400 CAPSULE ORAL at 20:39

## 2018-12-09 ASSESSMENT — PAIN SCALES - GENERAL
PAINLEVEL_OUTOF10: 0
PAINLEVEL_OUTOF10: 3

## 2018-12-10 LAB — GLUCOSE BLD-MCNC: 244 MG/DL (ref 75–110)

## 2018-12-10 PROCEDURE — 6370000000 HC RX 637 (ALT 250 FOR IP): Performed by: PSYCHIATRY & NEUROLOGY

## 2018-12-10 PROCEDURE — 1240000000 HC EMOTIONAL WELLNESS R&B

## 2018-12-10 PROCEDURE — 82947 ASSAY GLUCOSE BLOOD QUANT: CPT

## 2018-12-10 PROCEDURE — 6370000000 HC RX 637 (ALT 250 FOR IP): Performed by: INTERNAL MEDICINE

## 2018-12-10 RX ORDER — QUETIAPINE FUMARATE 300 MG/1
600 TABLET, FILM COATED ORAL NIGHTLY
Status: DISCONTINUED | OUTPATIENT
Start: 2018-12-12 | End: 2018-12-14 | Stop reason: HOSPADM

## 2018-12-10 RX ADMIN — PANTOPRAZOLE SODIUM 40 MG: 40 TABLET, DELAYED RELEASE ORAL at 09:16

## 2018-12-10 RX ADMIN — LISINOPRIL 5 MG: 5 TABLET ORAL at 09:16

## 2018-12-10 RX ADMIN — SIMVASTATIN 20 MG: 20 TABLET, FILM COATED ORAL at 20:43

## 2018-12-10 RX ADMIN — METFORMIN HYDROCHLORIDE 1000 MG: 500 TABLET, FILM COATED ORAL at 09:16

## 2018-12-10 RX ADMIN — TRAZODONE HYDROCHLORIDE 50 MG: 50 TABLET ORAL at 20:43

## 2018-12-10 RX ADMIN — LINAGLIPTIN 5 MG: 5 TABLET, FILM COATED ORAL at 09:16

## 2018-12-10 RX ADMIN — GABAPENTIN 800 MG: 400 CAPSULE ORAL at 15:07

## 2018-12-10 RX ADMIN — METFORMIN HYDROCHLORIDE 1000 MG: 500 TABLET, FILM COATED ORAL at 17:30

## 2018-12-10 RX ADMIN — QUETIAPINE FUMARATE 500 MG: 300 TABLET ORAL at 20:43

## 2018-12-10 RX ADMIN — SERTRALINE HYDROCHLORIDE 100 MG: 100 TABLET ORAL at 09:16

## 2018-12-10 RX ADMIN — GABAPENTIN 800 MG: 400 CAPSULE ORAL at 09:16

## 2018-12-10 RX ADMIN — ACETAMINOPHEN 650 MG: 325 TABLET, FILM COATED ORAL at 09:58

## 2018-12-10 RX ADMIN — GABAPENTIN 800 MG: 400 CAPSULE ORAL at 20:43

## 2018-12-10 ASSESSMENT — PAIN SCALES - GENERAL
PAINLEVEL_OUTOF10: 2
PAINLEVEL_OUTOF10: 0

## 2018-12-10 NOTE — CONSULTS
[facility]    Patient name:  Kim Poon  Date of admission:  12/1/2018 10:23 AM  MRN:   [id number]  YOB: 1965    Cc DM       HPI   Pt admitted with sympts of depression   Consult for DM      FS elevated around 200  HBA1c is 7.5       Past Medical History:   Diagnosis Date    Acute depression 9/8/2018    Back pain     DM2 (diabetes mellitus, type 2) ([facility] 4/21/2014    Generalized OA     GERD (gastroesophageal reflux disease)     HTN (hypertension)     Unspecified sleep apnea     c  pap     Family History   Problem Relation Age of Onset    Diabetes Mother       reports that he has been smoking Cigarettes. He has been smoking about 0.50 packs per day. He has never used smokeless tobacco. He reports that he uses drugs, including Marijuana. He reports that he does not drink alcohol. Past Medical History:   Diagnosis Date    Acute depression 9/8/2018    Back pain     DM2 (diabetes mellitus, type 2) ([facility] 4/21/2014    Generalized OA     GERD (gastroesophageal reflux disease)     HTN (hypertension)     Unspecified sleep apnea     c  pap     No Known Allergies    Review of systems    Constitutional: Negative for fever and fatigue. Respiratory: Negative for cough and shortness of breath. Cardiovascular: Negative for chest pain, palpitations and leg swelling. Gastrointestinal: Negative for abdominal pain, diarrhea and blood in stool. Endocrine: Negative for cold intolerance. Genitourinary: Negative for dysuria and frequency. Musculoskeletal: Negative for back pain and arthralgias. Skin: Negative for color change and rash. Neurological: Negative for dizziness and headaches. Psychiatric/Behavioral: Negative for confusion.      ROS  Physical exam     BP (!) 147/90   Pulse 96   Temp 98 °F (36.7 °C) (Oral)   Resp 14   Ht 5' 5\" (1.651 m)   Wt 230 lb (104.3 kg)   SpO2 100%   BMI 38.27 kg/m² General appearance: well nourished in no distress  Eyes:  PAIGE   Head: AT/NC  ENT NAD   Neck: Trachea midline; supple  Lungs: normal effort, clear to auscultation. CVS sinus with no murmurs. Vasc No JVP, no carotid bruit  Abdomen: Soft, non-tender; no masses or HSM,   Extremities: no edema; no digital cyanosis or clubbing. Musculoskeletal NO joint effusion or synovitis  Skin: No rash or ulcers  Neurologic: Cranial nerves II-XII grossly intact; no motor deficit. Psych: Appropriate affect, alert and oriented to person, place and time  NO lymphadenopathy            Relevant Labs/Imaging     CBC: No results for input(s): WBC, HGB, PLT in the last 72 hours. BMP:  No results for input(s): NA, K, CL, CO2, BUN, CREATININE, GLUCOSE in the last 72 hours. S. Calcium:No results for input(s): CALCIUM in the last 72 hours. Glycosylated hemoglobin A1C: No results for input(s): LABA1C in the last 72 hours. BNP:No results for input(s): BNP in the last 72 hours. Assessment / Plan      Patient Active Problem List   Diagnosis    GERD (gastroesophageal reflux disease)    Back pain    Sleep apnea, obstructive    Dyslipidemia    [facility]2 [facility], type 2) [facility]    Smoking    Schizophrenia ([facility]    Schizoaffective disorder ([facility]       A/P  HTN add norvasc 5 daily   DM add januvia 50 daily cont metformin   Will sign off thanks       MD LEONEL Bishop [address], [address] [place] [address].    Phone [phone]   Fax: [phone]  Answering Service: [phone]

## 2018-12-11 LAB
GLUCOSE BLD-MCNC: 205 MG/DL (ref 75–110)
GLUCOSE BLD-MCNC: 227 MG/DL (ref 75–110)

## 2018-12-11 PROCEDURE — 6370000000 HC RX 637 (ALT 250 FOR IP): Performed by: PSYCHIATRY & NEUROLOGY

## 2018-12-11 PROCEDURE — 82947 ASSAY GLUCOSE BLOOD QUANT: CPT

## 2018-12-11 PROCEDURE — 6370000000 HC RX 637 (ALT 250 FOR IP): Performed by: INTERNAL MEDICINE

## 2018-12-11 PROCEDURE — 1240000000 HC EMOTIONAL WELLNESS R&B

## 2018-12-11 RX ADMIN — SIMVASTATIN 20 MG: 20 TABLET, FILM COATED ORAL at 20:37

## 2018-12-11 RX ADMIN — METFORMIN HYDROCHLORIDE 1000 MG: 500 TABLET, FILM COATED ORAL at 09:02

## 2018-12-11 RX ADMIN — LISINOPRIL 5 MG: 5 TABLET ORAL at 09:02

## 2018-12-11 RX ADMIN — TRAZODONE HYDROCHLORIDE 50 MG: 50 TABLET ORAL at 20:37

## 2018-12-11 RX ADMIN — QUETIAPINE FUMARATE 500 MG: 300 TABLET ORAL at 20:37

## 2018-12-11 RX ADMIN — LINAGLIPTIN 5 MG: 5 TABLET, FILM COATED ORAL at 09:02

## 2018-12-11 RX ADMIN — METFORMIN HYDROCHLORIDE 1000 MG: 500 TABLET, FILM COATED ORAL at 17:25

## 2018-12-11 RX ADMIN — SERTRALINE HYDROCHLORIDE 100 MG: 100 TABLET ORAL at 09:02

## 2018-12-11 RX ADMIN — GABAPENTIN 800 MG: 400 CAPSULE ORAL at 20:37

## 2018-12-11 RX ADMIN — HYDROXYZINE HYDROCHLORIDE 25 MG: 25 TABLET, FILM COATED ORAL at 20:37

## 2018-12-11 RX ADMIN — PANTOPRAZOLE SODIUM 40 MG: 40 TABLET, DELAYED RELEASE ORAL at 09:02

## 2018-12-11 RX ADMIN — GABAPENTIN 800 MG: 400 CAPSULE ORAL at 09:02

## 2018-12-11 RX ADMIN — GABAPENTIN 800 MG: 400 CAPSULE ORAL at 14:28

## 2018-12-11 ASSESSMENT — PAIN SCALES - GENERAL: PAINLEVEL_OUTOF10: 0

## 2018-12-11 NOTE — PLAN OF CARE
Problem: Altered Mood, Depressive Behavior:  Goal: Ability to disclose and discuss suicidal ideas will improve  Ability to disclose and discuss suicidal ideas will improve   Outcome: Ongoing  Pt did not attend RT group at 1430 d/t resting in room despite staff invitation to attend.

## 2018-12-12 LAB
GLUCOSE BLD-MCNC: 191 MG/DL (ref 75–110)
GLUCOSE BLD-MCNC: 250 MG/DL (ref 75–110)

## 2018-12-12 PROCEDURE — 6370000000 HC RX 637 (ALT 250 FOR IP): Performed by: PSYCHIATRY & NEUROLOGY

## 2018-12-12 PROCEDURE — 6370000000 HC RX 637 (ALT 250 FOR IP): Performed by: INTERNAL MEDICINE

## 2018-12-12 PROCEDURE — 1240000000 HC EMOTIONAL WELLNESS R&B

## 2018-12-12 PROCEDURE — 82947 ASSAY GLUCOSE BLOOD QUANT: CPT

## 2018-12-12 RX ADMIN — METFORMIN HYDROCHLORIDE 1000 MG: 500 TABLET, FILM COATED ORAL at 08:21

## 2018-12-12 RX ADMIN — HYDROXYZINE HYDROCHLORIDE 25 MG: 25 TABLET, FILM COATED ORAL at 21:01

## 2018-12-12 RX ADMIN — GABAPENTIN 800 MG: 400 CAPSULE ORAL at 21:01

## 2018-12-12 RX ADMIN — SERTRALINE HYDROCHLORIDE 100 MG: 100 TABLET ORAL at 08:20

## 2018-12-12 RX ADMIN — PANTOPRAZOLE SODIUM 40 MG: 40 TABLET, DELAYED RELEASE ORAL at 08:20

## 2018-12-12 RX ADMIN — SIMVASTATIN 20 MG: 20 TABLET, FILM COATED ORAL at 21:01

## 2018-12-12 RX ADMIN — GABAPENTIN 800 MG: 400 CAPSULE ORAL at 08:20

## 2018-12-12 RX ADMIN — TRAZODONE HYDROCHLORIDE 50 MG: 50 TABLET ORAL at 21:01

## 2018-12-12 RX ADMIN — QUETIAPINE FUMARATE 600 MG: 300 TABLET, FILM COATED ORAL at 21:01

## 2018-12-12 RX ADMIN — LINAGLIPTIN 5 MG: 5 TABLET, FILM COATED ORAL at 08:20

## 2018-12-12 RX ADMIN — LISINOPRIL 5 MG: 5 TABLET ORAL at 08:20

## 2018-12-12 RX ADMIN — GABAPENTIN 800 MG: 400 CAPSULE ORAL at 14:22

## 2018-12-12 RX ADMIN — METFORMIN HYDROCHLORIDE 1000 MG: 500 TABLET, FILM COATED ORAL at 17:28

## 2018-12-13 LAB
GLUCOSE BLD-MCNC: 193 MG/DL (ref 75–110)
GLUCOSE BLD-MCNC: 203 MG/DL (ref 75–110)

## 2018-12-13 PROCEDURE — 1240000000 HC EMOTIONAL WELLNESS R&B

## 2018-12-13 PROCEDURE — 6370000000 HC RX 637 (ALT 250 FOR IP): Performed by: PSYCHIATRY & NEUROLOGY

## 2018-12-13 PROCEDURE — 82947 ASSAY GLUCOSE BLOOD QUANT: CPT

## 2018-12-13 PROCEDURE — 6370000000 HC RX 637 (ALT 250 FOR IP): Performed by: INTERNAL MEDICINE

## 2018-12-13 RX ORDER — SIMVASTATIN 20 MG
20 TABLET ORAL NIGHTLY
Qty: 30 TABLET | Refills: 0 | Status: SHIPPED | OUTPATIENT
Start: 2018-12-13 | End: 2019-03-29

## 2018-12-13 RX ORDER — TRAZODONE HYDROCHLORIDE 50 MG/1
50 TABLET ORAL NIGHTLY
Qty: 30 TABLET | Refills: 0 | Status: ON HOLD | OUTPATIENT
Start: 2018-12-13 | End: 2019-04-01

## 2018-12-13 RX ORDER — GABAPENTIN 800 MG/1
800 TABLET ORAL 3 TIMES DAILY
Qty: 90 TABLET | Refills: 0 | Status: ON HOLD | OUTPATIENT
Start: 2018-12-13 | End: 2019-04-01

## 2018-12-13 RX ORDER — PANTOPRAZOLE SODIUM 40 MG/1
40 TABLET, DELAYED RELEASE ORAL
Qty: 30 TABLET | Refills: 0 | Status: ON HOLD | OUTPATIENT
Start: 2018-12-13 | End: 2019-04-01 | Stop reason: SDUPTHER

## 2018-12-13 RX ORDER — HYDROXYZINE HYDROCHLORIDE 25 MG/1
25 TABLET, FILM COATED ORAL 2 TIMES DAILY PRN
Qty: 60 TABLET | Refills: 0 | Status: ON HOLD | OUTPATIENT
Start: 2018-12-13 | End: 2019-04-01 | Stop reason: HOSPADM

## 2018-12-13 RX ORDER — QUETIAPINE FUMARATE 300 MG/1
600 TABLET, FILM COATED ORAL NIGHTLY
Qty: 60 TABLET | Refills: 0 | Status: ON HOLD | OUTPATIENT
Start: 2018-12-13 | End: 2019-04-01 | Stop reason: SDUPTHER

## 2018-12-13 RX ORDER — LISINOPRIL 5 MG/1
5 TABLET ORAL DAILY
Qty: 30 TABLET | Refills: 0 | Status: SHIPPED | OUTPATIENT
Start: 2018-12-13 | End: 2019-03-29

## 2018-12-13 RX ORDER — SERTRALINE HYDROCHLORIDE 100 MG/1
100 TABLET, FILM COATED ORAL DAILY
Qty: 30 TABLET | Refills: 0 | Status: ON HOLD | OUTPATIENT
Start: 2018-12-13 | End: 2019-04-01 | Stop reason: SDUPTHER

## 2018-12-13 RX ADMIN — QUETIAPINE FUMARATE 600 MG: 300 TABLET, FILM COATED ORAL at 20:50

## 2018-12-13 RX ADMIN — TRAZODONE HYDROCHLORIDE 50 MG: 50 TABLET ORAL at 20:49

## 2018-12-13 RX ADMIN — METFORMIN HYDROCHLORIDE 1000 MG: 500 TABLET, FILM COATED ORAL at 08:34

## 2018-12-13 RX ADMIN — SERTRALINE HYDROCHLORIDE 100 MG: 100 TABLET ORAL at 08:34

## 2018-12-13 RX ADMIN — METFORMIN HYDROCHLORIDE 1000 MG: 500 TABLET, FILM COATED ORAL at 17:57

## 2018-12-13 RX ADMIN — GABAPENTIN 800 MG: 400 CAPSULE ORAL at 08:34

## 2018-12-13 RX ADMIN — GABAPENTIN 800 MG: 400 CAPSULE ORAL at 20:49

## 2018-12-13 RX ADMIN — PANTOPRAZOLE SODIUM 40 MG: 40 TABLET, DELAYED RELEASE ORAL at 08:34

## 2018-12-13 RX ADMIN — SIMVASTATIN 20 MG: 20 TABLET, FILM COATED ORAL at 20:49

## 2018-12-13 RX ADMIN — LINAGLIPTIN 5 MG: 5 TABLET, FILM COATED ORAL at 08:34

## 2018-12-13 RX ADMIN — LISINOPRIL 5 MG: 5 TABLET ORAL at 08:33

## 2018-12-13 RX ADMIN — GABAPENTIN 800 MG: 400 CAPSULE ORAL at 14:04

## 2018-12-13 RX ADMIN — HYDROXYZINE HYDROCHLORIDE 25 MG: 25 TABLET, FILM COATED ORAL at 08:34

## 2018-12-13 NOTE — PLAN OF CARE
Problem: Altered Mood, Depressive Behavior:  Goal: Ability to disclose and discuss suicidal ideas will improve  Ability to disclose and discuss suicidal ideas will improve   Outcome: Ongoing  Pt denied thoughts of suicide or self-harm and agreed to seek out staff should thoughts of suicide or self-harm arise. Safe environment maintained. Q15 minute checks for safety continued per unit policy. Will continue to monitor for safety and provide support and reassurance as needed. Goal: Absence of self-harm  Absence of self-harm   Outcome: Ongoing  No self harm noted this shift. Patient agrees to seek staff out if negative thoughts arise. Will continue to monitor Q15 minute and intermittently. Problem: Substance Abuse:  Goal: Absence of drug withdrawal signs and symptoms  Absence of drug withdrawal signs and symptoms   Outcome: Ongoing  Denies any signs and symptoms of drug withdrawals. Problem: Altered Mood, Psychotic Behavior:  Goal: Able to verbalize decrease in frequency and intensity of hallucinations  Able to verbalize decrease in frequency and intensity of hallucinations   Outcome: Ongoing  Patient states that his auditory hallucinations have decreased to a light mumble.

## 2018-12-13 NOTE — PLAN OF CARE
Problem: Altered Mood, Depressive Behavior:  Goal: Ability to disclose and discuss suicidal ideas will improve  Ability to disclose and discuss suicidal ideas will improve   Outcome: Ongoing  Pt denies suicidal ideations at this time and agrees to seek assistance from staff should thoughts of self harm arise. Safety maintained per unit policy, including q 02S patient safety checks. Goal: Absence of self-harm  Absence of self-harm   Outcome: Ongoing  Pt remains free from self harm at this time. Pt is observed to be out watching tv in the day room, socializing with peers for majority of the shift. No self harm behaviors noted. Safety maintained. Problem: Substance Abuse:  Goal: Absence of drug withdrawal signs and symptoms  Absence of drug withdrawal signs and symptoms   Outcome: Ongoing  Pt denies withdrawal signs and symptoms at this time and does not appear be withdrawing during assessment by writer. Will continue to monitor patient for safety and behavior. Safety maintained. Problem: Altered Mood, Psychotic Behavior:  Goal: Able to verbalize decrease in frequency and intensity of hallucinations  Able to verbalize decrease in frequency and intensity of hallucinations   Outcome: Ongoing  Pt continues to report mumbled, auditory hallucinations ton writer, but does report they are improving. Safety maintained. Goal: Able to verbalize reality based thinking  Able to verbalize reality based thinking   Outcome: Ongoing  Pt is able to verbalize reality based thinking at this time. Thought content is WNL. Safety maintained.

## 2018-12-13 NOTE — PLAN OF CARE
Problem: Altered Mood, Depressive Behavior:  Goal: Absence of self-harm  Absence of self-harm   Outcome: Ongoing  Pt did not participate in leisure group at 1100 despite staff encouragement to attend.

## 2018-12-13 NOTE — PROGRESS NOTES
CLINICAL PHARMACY NOTE: MEDS TO [address] Select Patient?: Yes  Total # of Prescriptions Filled: 8   The following medications were delivered to the patient:  · Trazodone, quetiapine 300mg, simvastatin 20mg, lisinopril 5mg, gabapentin 800mg, pantoprazole 40mg, hydroxyzine 25mg and metformin 1000mg  Total # of Interventions Completed: 2  Time Spent (min): 60    Additional Documentation  Sertraline too soon to fill and Liliana vallecillo a pa and we have it on file

## 2018-12-14 VITALS
TEMPERATURE: 98.1 F | HEIGHT: 65 IN | RESPIRATION RATE: 16 BRPM | OXYGEN SATURATION: 100 % | DIASTOLIC BLOOD PRESSURE: 101 MMHG | WEIGHT: 230 LBS | BODY MASS INDEX: 38.32 KG/M2 | HEART RATE: 120 BPM | SYSTOLIC BLOOD PRESSURE: 139 MMHG

## 2018-12-14 LAB — GLUCOSE BLD-MCNC: 215 MG/DL (ref 75–110)

## 2018-12-14 PROCEDURE — 6370000000 HC RX 637 (ALT 250 FOR IP): Performed by: PSYCHIATRY & NEUROLOGY

## 2018-12-14 PROCEDURE — 82947 ASSAY GLUCOSE BLOOD QUANT: CPT

## 2018-12-14 PROCEDURE — 6370000000 HC RX 637 (ALT 250 FOR IP): Performed by: INTERNAL MEDICINE

## 2018-12-14 PROCEDURE — 5130000000 HC BRIDGE APPOINTMENT

## 2018-12-14 RX ADMIN — SERTRALINE HYDROCHLORIDE 100 MG: 100 TABLET ORAL at 08:44

## 2018-12-14 RX ADMIN — PANTOPRAZOLE SODIUM 40 MG: 40 TABLET, DELAYED RELEASE ORAL at 08:44

## 2018-12-14 RX ADMIN — LISINOPRIL 5 MG: 5 TABLET ORAL at 08:43

## 2018-12-14 RX ADMIN — LINAGLIPTIN 5 MG: 5 TABLET, FILM COATED ORAL at 08:43

## 2018-12-14 RX ADMIN — HYDROXYZINE HYDROCHLORIDE 25 MG: 25 TABLET, FILM COATED ORAL at 08:44

## 2018-12-14 RX ADMIN — METFORMIN HYDROCHLORIDE 1000 MG: 500 TABLET, FILM COATED ORAL at 08:43

## 2018-12-14 RX ADMIN — GABAPENTIN 800 MG: 400 CAPSULE ORAL at 08:43

## 2018-12-14 NOTE — BH NOTE
[facility]  Discharge Note    Pt discharged with followings belongings:   Dentures: None  Vision - Corrective Lenses: None  Hearing Aid: None  Jewelry: None  Body Piercings Removed: N/A  Clothing: Footwear, Jacket / coat, Pants, Shirt, Socks, Undergarments (Comment)  Were All Patient Medications Collected?: Not Applicable  Other Valuables: Cell phone, Money (Comment)   Valuables sent home with patient. Valuables retrieved from safe, Security envelope number: documented and returned to patient. Patient left department with Departure Mode: By self, In cab via Mobility at Departure: Ambulatory, discharged to Discharged to: Private Residence. Patient education on aftercare instructions: yes  Information faxed to [place]  by nurse Patient verbalize understanding of AVS:  yes. Status EXAM upon discharge:  Status and Exam  Normal: No  Facial Expression: Flat  Affect: Appropriate  Level of Consciousness: Alert  Mood:Normal: No  Mood: Depressed  Motor Activity:Normal: No  Motor Activity: Decreased  Interview Behavior: Cooperative  Preception: [place] to Person, Bren Buerger to Time, [place] to Place, [place] to Situation  Attention:Normal: No  Attention: Distractible  Thought Processes: Circumstantial  Thought Content:Normal: No  Thought Content: Poverty of Content, Preoccupations  Hallucinations:  Auditory (Comment) (mumbles)  Delusions: No  Memory:Normal: Yes  Insight and Judgment: No  Insight and Judgment: Unmotivated  Present Suicidal Ideation: No  Present Homicidal Ideation: No    Yessenia Barahona LPN

## 2018-12-15 NOTE — PROGRESS NOTES
This writer attempted to contact pt for hospital discharge follow up phone call on 12/15/18 at [phone], however, the pt did not answer.

## 2019-02-09 ENCOUNTER — HOSPITAL ENCOUNTER (EMERGENCY)
Age: 54
Discharge: HOME | End: 2019-02-09
Attending: EMERGENCY MEDICINE
Payer: COMMERCIAL

## 2019-02-09 VITALS
SYSTOLIC BLOOD PRESSURE: 123 MMHG | RESPIRATION RATE: 18 BRPM | HEIGHT: 65 IN | TEMPERATURE: 97.7 F | DIASTOLIC BLOOD PRESSURE: 80 MMHG | WEIGHT: 230 LBS | HEART RATE: 78 BPM | BODY MASS INDEX: 38.32 KG/M2 | OXYGEN SATURATION: 100 %

## 2019-02-09 DIAGNOSIS — Z20.2 POSSIBLE EXPOSURE TO STD: Primary | ICD-10-CM

## 2019-02-09 LAB
-: ABNORMAL
AMORPHOUS: ABNORMAL
BACTERIA: ABNORMAL
BILIRUBIN URINE: NEGATIVE
CASTS UA: ABNORMAL /LPF (ref 0–8)
COLOR: YELLOW
CRYSTALS, UA: ABNORMAL /HPF
DIRECT EXAM: NORMAL
EPITHELIAL CELLS UA: ABNORMAL /HPF (ref 0–5)
GLUCOSE URINE: ABNORMAL
KETONES, URINE: NEGATIVE
LEUKOCYTE ESTERASE, URINE: NEGATIVE
Lab: NORMAL
MUCUS: ABNORMAL
NITRITE, URINE: NEGATIVE
OTHER OBSERVATIONS UA: ABNORMAL
PH UA: 5 (ref 5–8)
PROTEIN UA: NEGATIVE
RBC UA: ABNORMAL /HPF (ref 0–4)
RENAL EPITHELIAL, UA: ABNORMAL /HPF
SPECIFIC GRAVITY UA: 1.02 (ref 1–1.03)
SPECIMEN DESCRIPTION: NORMAL
STATUS: NORMAL
TRICHOMONAS: ABNORMAL
TURBIDITY: CLEAR
URINE HGB: NEGATIVE
UROBILINOGEN, URINE: NORMAL
WBC UA: ABNORMAL /HPF (ref 0–5)
YEAST: ABNORMAL

## 2019-02-09 PROCEDURE — 87491 CHLMYD TRACH DNA AMP PROBE: CPT

## 2019-02-09 PROCEDURE — 99283 EMERGENCY DEPT VISIT LOW MDM: CPT

## 2019-02-09 PROCEDURE — 81001 URINALYSIS AUTO W/SCOPE: CPT

## 2019-02-09 PROCEDURE — 87210 SMEAR WET MOUNT SALINE/INK: CPT

## 2019-02-09 PROCEDURE — 6370000000 HC RX 637 (ALT 250 FOR IP): Performed by: PHYSICIAN ASSISTANT

## 2019-02-09 PROCEDURE — 87591 N.GONORRHOEAE DNA AMP PROB: CPT

## 2019-02-09 RX ORDER — METRONIDAZOLE 500 MG/1
2000 TABLET ORAL ONCE
Status: COMPLETED | OUTPATIENT
Start: 2019-02-09 | End: 2019-02-09

## 2019-02-09 RX ADMIN — METRONIDAZOLE 2000 MG: 500 TABLET ORAL at 11:38

## 2019-02-09 ASSESSMENT — ENCOUNTER SYMPTOMS
SORE THROAT: 0
BACK PAIN: 0
NAUSEA: 0
COUGH: 0
SHORTNESS OF BREATH: 0
ABDOMINAL PAIN: 0
COLOR CHANGE: 0
VOMITING: 0
DIARRHEA: 0

## 2019-02-11 ENCOUNTER — HOSPITAL ENCOUNTER (OUTPATIENT)
Age: 54
Discharge: HOME | End: 2019-02-11
Payer: COMMERCIAL

## 2019-02-11 LAB
ABSOLUTE EOS #: 0.1 K/UL (ref 0–0.44)
ABSOLUTE IMMATURE GRANULOCYTE: 0.06 K/UL (ref 0–0.3)
ABSOLUTE LYMPH #: 2.05 K/UL (ref 1.1–3.7)
ABSOLUTE MONO #: 0.52 K/UL (ref 0.1–1.2)
ALBUMIN SERPL-MCNC: 4.1 G/DL (ref 3.5–5.2)
ALBUMIN/GLOBULIN RATIO: 1.7 (ref 1–2.5)
ALP BLD-CCNC: 71 U/L (ref 40–129)
ALT SERPL-CCNC: 26 U/L (ref 5–41)
ANION GAP SERPL CALCULATED.3IONS-SCNC: 13 MMOL/L (ref 9–17)
AST SERPL-CCNC: 21 U/L
BASOPHILS # BLD: 0 % (ref 0–2)
BASOPHILS ABSOLUTE: 0.03 K/UL (ref 0–0.2)
BILIRUB SERPL-MCNC: 0.36 MG/DL (ref 0.3–1.2)
BILIRUBIN URINE: NEGATIVE
BUN BLDV-MCNC: 18 MG/DL (ref 6–20)
BUN/CREAT BLD: ABNORMAL (ref 9–20)
C. TRACHOMATIS DNA ,URINE: NEGATIVE
CALCIUM SERPL-MCNC: 9.5 MG/DL (ref 8.6–10.4)
CHLORIDE BLD-SCNC: 106 MMOL/L (ref 98–107)
CHOLESTEROL, FASTING: 218 MG/DL
CHOLESTEROL/HDL RATIO: 3.1
CO2: 22 MMOL/L (ref 20–31)
COLOR: YELLOW
COMMENT UA: NORMAL
CREAT SERPL-MCNC: 0.88 MG/DL (ref 0.7–1.2)
CREATININE URINE: 76.4 MG/DL (ref 39–259)
DIFFERENTIAL TYPE: ABNORMAL
EOSINOPHILS RELATIVE PERCENT: 2 % (ref 1–4)
ESTIMATED AVERAGE GLUCOSE: 169 MG/DL
GFR AFRICAN AMERICAN: >60 ML/MIN
GFR NON-AFRICAN AMERICAN: >60 ML/MIN
GFR SERPL CREATININE-BSD FRML MDRD: ABNORMAL ML/MIN/{1.73_M2}
GFR SERPL CREATININE-BSD FRML MDRD: ABNORMAL ML/MIN/{1.73_M2}
GLUCOSE BLD-MCNC: 155 MG/DL (ref 70–99)
GLUCOSE URINE: NEGATIVE
HBA1C MFR BLD: 7.5 % (ref 4–6)
HCT VFR BLD CALC: 44.8 % (ref 40.7–50.3)
HDLC SERPL-MCNC: 71 MG/DL
HEMOGLOBIN: 15.5 G/DL (ref 13–17)
IMMATURE GRANULOCYTES: 1 %
KETONES, URINE: NEGATIVE
LDL CHOLESTEROL: 123 MG/DL (ref 0–130)
LEUKOCYTE ESTERASE, URINE: NEGATIVE
LYMPHOCYTES # BLD: 31 % (ref 24–43)
MCH RBC QN AUTO: 28.4 PG (ref 25.2–33.5)
MCHC RBC AUTO-ENTMCNC: 34.6 G/DL (ref 28.4–34.8)
MCV RBC AUTO: 82.1 FL (ref 82.6–102.9)
MICROALBUMIN/CREAT 24H UR: <12 MG/L
MICROALBUMIN/CREAT UR-RTO: NORMAL MCG/MG CREAT
MONOCYTES # BLD: 8 % (ref 3–12)
N. GONORRHOEAE DNA, URINE: NEGATIVE
NITRITE, URINE: NEGATIVE
NRBC AUTOMATED: 0 PER 100 WBC
PDW BLD-RTO: 15.5 % (ref 11.8–14.4)
PH UA: 5 (ref 5–8)
PLATELET # BLD: 365 K/UL (ref 138–453)
PLATELET ESTIMATE: ABNORMAL
PMV BLD AUTO: 9.8 FL (ref 8.1–13.5)
POTASSIUM SERPL-SCNC: 4.7 MMOL/L (ref 3.7–5.3)
PROTEIN UA: NEGATIVE
RBC # BLD: 5.46 M/UL (ref 4.21–5.77)
RBC # BLD: ABNORMAL 10*6/UL
SEG NEUTROPHILS: 58 % (ref 36–65)
SEGMENTED NEUTROPHILS ABSOLUTE COUNT: 3.95 K/UL (ref 1.5–8.1)
SODIUM BLD-SCNC: 141 MMOL/L (ref 135–144)
SPECIFIC GRAVITY UA: 1.02 (ref 1–1.03)
SPECIMEN DESCRIPTION: NORMAL
THYROXINE, FREE: 1.28 NG/DL (ref 0.93–1.7)
TOTAL PROTEIN: 6.5 G/DL (ref 6.4–8.3)
TRIGLYCERIDE, FASTING: 121 MG/DL
TSH SERPL DL<=0.05 MIU/L-ACNC: 0.75 MIU/L (ref 0.3–5)
TURBIDITY: CLEAR
URINE HGB: NEGATIVE
UROBILINOGEN, URINE: NORMAL
VITAMIN D 25-HYDROXY: 7.8 NG/ML (ref 30–100)
VLDLC SERPL CALC-MCNC: ABNORMAL MG/DL (ref 1–30)
WBC # BLD: 6.7 K/UL (ref 3.5–11.3)
WBC # BLD: ABNORMAL 10*3/UL

## 2019-02-11 PROCEDURE — 85025 COMPLETE CBC W/AUTO DIFF WBC: CPT

## 2019-02-11 PROCEDURE — 82570 ASSAY OF URINE CREATININE: CPT

## 2019-02-11 PROCEDURE — 36415 COLL VENOUS BLD VENIPUNCTURE: CPT

## 2019-02-11 PROCEDURE — 80053 COMPREHEN METABOLIC PANEL: CPT

## 2019-02-11 PROCEDURE — 82306 VITAMIN D 25 HYDROXY: CPT

## 2019-02-11 PROCEDURE — 84439 ASSAY OF FREE THYROXINE: CPT

## 2019-02-11 PROCEDURE — 81003 URINALYSIS AUTO W/O SCOPE: CPT

## 2019-02-11 PROCEDURE — 82043 UR ALBUMIN QUANTITATIVE: CPT

## 2019-02-11 PROCEDURE — 83036 HEMOGLOBIN GLYCOSYLATED A1C: CPT

## 2019-02-11 PROCEDURE — 84443 ASSAY THYROID STIM HORMONE: CPT

## 2019-02-11 PROCEDURE — 80061 LIPID PANEL: CPT

## 2019-03-29 ENCOUNTER — HOSPITAL ENCOUNTER (INPATIENT)
Age: 54
LOS: 3 days | Discharge: HOME | DRG: 750 | End: 2019-04-01
Attending: EMERGENCY MEDICINE | Admitting: PSYCHIATRY & NEUROLOGY
Payer: COMMERCIAL

## 2019-03-29 DIAGNOSIS — F20.9 SCHIZOPHRENIA, UNSPECIFIED TYPE (HCC): ICD-10-CM

## 2019-03-29 DIAGNOSIS — R45.851 SUICIDAL THOUGHTS: Primary | ICD-10-CM

## 2019-03-29 PROBLEM — F25.0 SCHIZOAFFECTIVE DISORDER, BIPOLAR TYPE (HCC): Status: ACTIVE | Noted: 2019-03-29

## 2019-03-29 PROBLEM — F25.9 SCHIZOAFFECTIVE DISORDER (HCC): Status: RESOLVED | Noted: 2018-09-08 | Resolved: 2019-03-29

## 2019-03-29 LAB
ABSOLUTE EOS #: 0 K/UL (ref 0–0.4)
ABSOLUTE IMMATURE GRANULOCYTE: ABNORMAL K/UL (ref 0–0.3)
ABSOLUTE LYMPH #: 1.2 K/UL (ref 1–4.8)
ABSOLUTE MONO #: 0.5 K/UL (ref 0.1–1.3)
ALBUMIN SERPL-MCNC: 3.4 G/DL (ref 3.5–5.2)
ALBUMIN/GLOBULIN RATIO: ABNORMAL (ref 1–2.5)
ALP BLD-CCNC: 17 U/L (ref 40–129)
ALT SERPL-CCNC: 21 U/L (ref 5–41)
AMPHETAMINE SCREEN URINE: NEGATIVE
ANION GAP SERPL CALCULATED.3IONS-SCNC: 10 MMOL/L (ref 9–17)
AST SERPL-CCNC: 17 U/L
BARBITURATE SCREEN URINE: NEGATIVE
BASOPHILS # BLD: 1 % (ref 0–2)
BASOPHILS ABSOLUTE: 0 K/UL (ref 0–0.2)
BENZODIAZEPINE SCREEN, URINE: NEGATIVE
BILIRUB SERPL-MCNC: <0.15 MG/DL (ref 0.3–1.2)
BUN BLDV-MCNC: 19 MG/DL (ref 6–20)
BUN/CREAT BLD: ABNORMAL (ref 9–20)
BUPRENORPHINE URINE: ABNORMAL
CALCIUM SERPL-MCNC: 8.7 MG/DL (ref 8.6–10.4)
CANNABINOID SCREEN URINE: NEGATIVE
CHLORIDE BLD-SCNC: 104 MMOL/L (ref 98–107)
CO2: 24 MMOL/L (ref 20–31)
COCAINE METABOLITE, URINE: POSITIVE
CREAT SERPL-MCNC: 1.34 MG/DL (ref 0.7–1.2)
DIFFERENTIAL TYPE: ABNORMAL
EOSINOPHILS RELATIVE PERCENT: 1 % (ref 0–4)
ETHANOL PERCENT: <0.01 %
ETHANOL: <10 MG/DL
GFR AFRICAN AMERICAN: >60 ML/MIN
GFR NON-AFRICAN AMERICAN: 56 ML/MIN
GFR SERPL CREATININE-BSD FRML MDRD: ABNORMAL ML/MIN/{1.73_M2}
GFR SERPL CREATININE-BSD FRML MDRD: ABNORMAL ML/MIN/{1.73_M2}
GLUCOSE BLD-MCNC: 204 MG/DL (ref 70–99)
HCT VFR BLD CALC: 39.4 % (ref 41–53)
HEMOGLOBIN: 13.5 G/DL (ref 13.5–17.5)
IMMATURE GRANULOCYTES: ABNORMAL %
LYMPHOCYTES # BLD: 24 % (ref 24–44)
MCH RBC QN AUTO: 29.1 PG (ref 26–34)
MCHC RBC AUTO-ENTMCNC: 34.3 G/DL (ref 31–37)
MCV RBC AUTO: 84.8 FL (ref 80–100)
MDMA URINE: ABNORMAL
METHADONE SCREEN, URINE: NEGATIVE
METHAMPHETAMINE, URINE: ABNORMAL
MONOCYTES # BLD: 10 % (ref 1–7)
NRBC AUTOMATED: ABNORMAL PER 100 WBC
OPIATES, URINE: NEGATIVE
OXYCODONE SCREEN URINE: NEGATIVE
PDW BLD-RTO: 14.1 % (ref 11.5–14.9)
PHENCYCLIDINE, URINE: NEGATIVE
PLATELET # BLD: 297 K/UL (ref 150–450)
PLATELET ESTIMATE: ABNORMAL
PMV BLD AUTO: 7.9 FL (ref 6–12)
POTASSIUM SERPL-SCNC: 4.5 MMOL/L (ref 3.7–5.3)
PROPOXYPHENE, URINE: ABNORMAL
RBC # BLD: 4.65 M/UL (ref 4.5–5.9)
RBC # BLD: ABNORMAL 10*6/UL
SEG NEUTROPHILS: 64 % (ref 36–66)
SEGMENTED NEUTROPHILS ABSOLUTE COUNT: 3.3 K/UL (ref 1.3–9.1)
SODIUM BLD-SCNC: 138 MMOL/L (ref 135–144)
TEST INFORMATION: ABNORMAL
TOTAL PROTEIN: 5.4 G/DL (ref 6.4–8.3)
TRICYCLIC ANTIDEPRESSANTS, UR: ABNORMAL
WBC # BLD: 5.1 K/UL (ref 3.5–11)
WBC # BLD: ABNORMAL 10*3/UL

## 2019-03-29 PROCEDURE — 36415 COLL VENOUS BLD VENIPUNCTURE: CPT

## 2019-03-29 PROCEDURE — 6370000000 HC RX 637 (ALT 250 FOR IP): Performed by: PSYCHIATRY & NEUROLOGY

## 2019-03-29 PROCEDURE — 1240000000 HC EMOTIONAL WELLNESS R&B

## 2019-03-29 PROCEDURE — 85025 COMPLETE CBC W/AUTO DIFF WBC: CPT

## 2019-03-29 PROCEDURE — 80307 DRUG TEST PRSMV CHEM ANLYZR: CPT

## 2019-03-29 PROCEDURE — 80053 COMPREHEN METABOLIC PANEL: CPT

## 2019-03-29 PROCEDURE — 99285 EMERGENCY DEPT VISIT HI MDM: CPT

## 2019-03-29 PROCEDURE — G0480 DRUG TEST DEF 1-7 CLASSES: HCPCS

## 2019-03-29 RX ORDER — SERTRALINE HYDROCHLORIDE 100 MG/1
100 TABLET, FILM COATED ORAL DAILY
Status: DISCONTINUED | OUTPATIENT
Start: 2019-03-29 | End: 2019-04-01 | Stop reason: HOSPADM

## 2019-03-29 RX ORDER — BENZTROPINE MESYLATE 1 MG/ML
2 INJECTION INTRAMUSCULAR; INTRAVENOUS DAILY PRN
Status: DISCONTINUED | OUTPATIENT
Start: 2019-03-29 | End: 2019-04-01 | Stop reason: HOSPADM

## 2019-03-29 RX ORDER — LISINOPRIL 5 MG/1
5 TABLET ORAL DAILY
Status: DISCONTINUED | OUTPATIENT
Start: 2019-03-29 | End: 2019-04-01 | Stop reason: HOSPADM

## 2019-03-29 RX ORDER — GABAPENTIN 400 MG/1
800 CAPSULE ORAL 3 TIMES DAILY
Status: DISCONTINUED | OUTPATIENT
Start: 2019-03-29 | End: 2019-04-01 | Stop reason: HOSPADM

## 2019-03-29 RX ORDER — LISINOPRIL 5 MG/1
5 TABLET ORAL DAILY
Status: ON HOLD | COMMUNITY
End: 2019-04-01 | Stop reason: SDUPTHER

## 2019-03-29 RX ORDER — OXYCODONE AND ACETAMINOPHEN 10; 325 MG/1; MG/1
1 TABLET ORAL EVERY 6 HOURS PRN
Status: DISCONTINUED | OUTPATIENT
Start: 2019-03-29 | End: 2019-03-29 | Stop reason: CLARIF

## 2019-03-29 RX ORDER — TRAZODONE HYDROCHLORIDE 50 MG/1
50 TABLET ORAL NIGHTLY
Status: DISCONTINUED | OUTPATIENT
Start: 2019-03-29 | End: 2019-04-01 | Stop reason: HOSPADM

## 2019-03-29 RX ORDER — OXYCODONE AND ACETAMINOPHEN 10; 325 MG/1; MG/1
1 TABLET ORAL EVERY 6 HOURS PRN
Status: ON HOLD | COMMUNITY
End: 2020-08-15

## 2019-03-29 RX ORDER — OXYCODONE HYDROCHLORIDE 5 MG/1
5 TABLET ORAL EVERY 6 HOURS PRN
Status: DISCONTINUED | OUTPATIENT
Start: 2019-03-29 | End: 2019-04-01 | Stop reason: HOSPADM

## 2019-03-29 RX ORDER — QUETIAPINE FUMARATE 300 MG/1
600 TABLET, FILM COATED ORAL NIGHTLY
Status: DISCONTINUED | OUTPATIENT
Start: 2019-03-29 | End: 2019-04-01 | Stop reason: HOSPADM

## 2019-03-29 RX ORDER — PANTOPRAZOLE SODIUM 40 MG/1
40 TABLET, DELAYED RELEASE ORAL
Status: DISCONTINUED | OUTPATIENT
Start: 2019-03-30 | End: 2019-04-01 | Stop reason: HOSPADM

## 2019-03-29 RX ORDER — OXYCODONE HYDROCHLORIDE AND ACETAMINOPHEN 5; 325 MG/1; MG/1
1 TABLET ORAL EVERY 6 HOURS PRN
Status: DISCONTINUED | OUTPATIENT
Start: 2019-03-29 | End: 2019-04-01 | Stop reason: HOSPADM

## 2019-03-29 RX ORDER — HYDROXYZINE HYDROCHLORIDE 25 MG/1
25 TABLET, FILM COATED ORAL 2 TIMES DAILY PRN
Status: DISCONTINUED | OUTPATIENT
Start: 2019-03-29 | End: 2019-04-01 | Stop reason: HOSPADM

## 2019-03-29 RX ADMIN — METFORMIN HYDROCHLORIDE 1000 MG: 1000 TABLET, FILM COATED ORAL at 21:18

## 2019-03-29 RX ADMIN — OXYCODONE AND ACETAMINOPHEN 1 TABLET: 5; 325 TABLET ORAL at 21:19

## 2019-03-29 RX ADMIN — TRAZODONE HYDROCHLORIDE 50 MG: 50 TABLET ORAL at 21:19

## 2019-03-29 RX ADMIN — LISINOPRIL 5 MG: 5 TABLET ORAL at 22:09

## 2019-03-29 RX ADMIN — SERTRALINE HYDROCHLORIDE 100 MG: 100 TABLET ORAL at 22:24

## 2019-03-29 RX ADMIN — GABAPENTIN 800 MG: 400 CAPSULE ORAL at 21:18

## 2019-03-29 RX ADMIN — QUETIAPINE FUMARATE 600 MG: 300 TABLET ORAL at 21:18

## 2019-03-29 ASSESSMENT — SLEEP AND FATIGUE QUESTIONNAIRES
DIFFICULTY ARISING: NO
RESTFUL SLEEP: NO
SLEEP PATTERN: DIFFICULTY FALLING ASLEEP;INSOMNIA
DO YOU HAVE DIFFICULTY SLEEPING: YES
DIFFICULTY STAYING ASLEEP: YES
AVERAGE NUMBER OF SLEEP HOURS: 3
DO YOU USE A SLEEP AID: YES
DIFFICULTY FALLING ASLEEP: YES

## 2019-03-29 ASSESSMENT — PAIN SCALES - GENERAL
PAINLEVEL_OUTOF10: 7
PAINLEVEL_OUTOF10: 0
PAINLEVEL_OUTOF10: 6

## 2019-03-29 ASSESSMENT — LIFESTYLE VARIABLES: HISTORY_ALCOHOL_USE: NO

## 2019-03-29 NOTE — BH NOTE
`[facility]  Admission Note     Admission Type:   Admission Type: Voluntary    Reason for admission:  Reason for Admission: SI to stab self: auditory hallucinations to harm self    PATIENT STRENGTHS:  Strengths: Connection to output provider    Patient Strengths and Limitations:  Limitations: Difficulty problem solving/relies on others to help solve problems, Multiple barriers to leisure interests    Addictive Behavior:   Addictive Behavior  In the past 3 months, have you felt or has someone told you that you have a problem with:  : None  Do you have a history of Chemical Use?: No  Do you have a history of Alcohol Use?: No  Do you have a history of Street Drug Abuse?: Yes  Histroy of Prescripton Drug Abuse?: No    Medical Problems:   Past Medical History:   Diagnosis Date    Acute depression 9/8/2018    Back pain     DM2 (diabetes mellitus, type 2) ([facility] 4/21/2014    Generalized OA     GERD (gastroesophageal reflux disease)     HTN (hypertension)     Schizoaffective disorder, bipolar type ([facility] 3/29/2019    Unspecified sleep apnea     c  pap       Status EXAM:  Status and Exam  Normal: Yes  Facial Expression: Flat  Affect: Appropriate  Level of Consciousness: Alert  Mood:Normal: Yes  Motor Activity:Normal: Yes  Interview Behavior: Cooperative  Preception: [place] to Person, [place] to Time, [place] to Place, [place] to Situation  Attention:Normal: Yes  Thought Processes: Blocking  Thought Content:Normal: Yes  Hallucinations:  Auditory (Comment)(voices telling him to kill self)  Delusions: No  Memory:Normal: Yes  Insight and Judgment: No  Insight and Judgment: Poor Judgment, Poor Insight  Present Suicidal Ideation: Yes(fleetin si, voices telling him to kill self, contracts for safety)  Present Homicidal Ideation: No    Tobacco Screening:  Practical Counseling, on admission, billie X, if applicable and completed (first 3 are required if patient doesn't refuse):            ( )  Recognizing danger situations (included triggers and roadblocks)                    ( )  Coping skills (new ways to manage stress, exercise, relaxation techniques, changing routine, distraction)                                                           ( )  Basic information about quitting (benefits of quitting, techniques in how to quit, available resources  ( ) Referral for counseling faxed to [facility]                                           ( ) Patient refused counseling  ( ) Patient has not smoked in the last 30 days    Metabolic Screening:    Lab Results   Component Value Date    LABA1C 7.5 (H) 02/11/2019       No results for input(s): CHOL, TRIG, HDL, LDLCALC, LABVLDL in the last 72 hours. Body mass index is 38.27 kg/m². BP Readings from Last 2 Encounters:   03/29/19 (!) 145/92   02/09/19 123/80           Pt admitted with followings belongings:  Dentures: None  Vision - Corrective Lenses: Glasses  Hearing Aid: None  Jewelry: None  Body Piercings Removed: N/A  Clothing: Footwear, Jacket / coat, Pants, Shirt, Undergarments (Comment)  Were All Patient Medications Collected?: Not Applicable  Other Valuables: Cell phone, Money (Comment)     Admitted due to suicidal ideations to stab self or jump off bridge. Patient reports auditory hallucinations that are command hallucinations telling him to stab himself. Upon admission, pt denies all but suicidal ideations with no plan. Valuables sent to safe . Valuables placed in safe in security envelope, number:  [id number]. Patient's home medications were not brought in. Patient oriented to surroundings and program expectations and copy of patient rights given. Received admission packet:  yes. Consents reviewed, signed yes. Refused no. Patient verbalize understanding:  yes.     Patient education on precautions: yes                   Kae Veloz, RN

## 2019-03-29 NOTE — CARE COORDINATION
Writer attempted to complete initial psychosocial assessment with pt, however pt stated he was tired and dismissed writer. SW will attempt to complete at a later time.

## 2019-03-29 NOTE — BH NOTE
Patient given tobacco quitline number 37818225461 at this time, refusing to call at this time, states \" I gonna try to quit but I don't want to call the hotline. \" - patient given information as to the dangers of long term tobacco use. Continue to reinforce the importance of tobacco cessation.

## 2019-03-29 NOTE — ED PROVIDER NOTES
EMERGENCY DEPARTMENT ENCOUNTER    Pt Name: Lawrence Vernon  MRN: [id number]  [place] 1965  Date of evaluation: 3/29/19  CHIEF COMPLAINT     suicidal  HISTORY OF PRESENT ILLNESS     Mental Health Problem   Presenting symptoms: delusional, depression, disorganized thought process, suicidal thoughts and suicidal threats    Presenting symptoms comment:  Hearing voices  Degree of incapacity (severity):  Severe  Onset quality:  Gradual  Duration:  1 week  Timing:  Constant  Progression:  Worsening  Chronicity:  Recurrent  Context: drug abuse and noncompliance    Compliance with total regimen: off meds 1 week. Ineffective treatments:  None tried  Associated symptoms: feelings of worthlessness, insomnia, irritability and poor judgment    [facility] patient  Voices telling him not to take his meds and to stab himself    REVIEW OF SYSTEMS     Review of Systems   Constitutional: Positive for irritability. Psychiatric/Behavioral: Positive for suicidal ideas. The patient has insomnia. All other systems reviewed and are negative. PASTMEDICAL HISTORY     Past Medical History:   Diagnosis Date    Acute depression 9/8/2018    Back pain     DM2 (diabetes mellitus, type 2) ([facility] 4/21/2014    Generalized OA     GERD (gastroesophageal reflux disease)     HTN (hypertension)     Unspecified sleep apnea     c  pap     SURGICAL HISTORY       Past Surgical History:   Procedure Laterality Date    TONSILLECTOMY AND ADENOIDECTOMY       CURRENT MEDICATIONS       Previous Medications    GABAPENTIN (NEURONTIN) 800 MG TABLET    Take 1 tablet by mouth 3 times daily for 30 days. Kaylen Erika     HYDROXYZINE (ATARAX) 25 MG TABLET    Take 1 tablet by mouth 2 times daily as needed for Anxiety    METFORMIN (GLUCOPHAGE) 1000 MG TABLET    Take 1 tablet by mouth 2 times daily    NICOTINE (NICODERM CQ) 7 MG/24HR    Place 1 patch onto the skin every 24 hours    OXYCODONE-ACETAMINOPHEN (PERCOCET)  MG PER TABLET    Take 1 tablet by mouth every 6 hours as needed for Pain. PANTOPRAZOLE (PROTONIX) 40 MG TABLET    Take 1 tablet by mouth every morning (before breakfast)    QUETIAPINE (SEROQUEL) 300 MG TABLET    Take 2 tablets by mouth nightly    SERTRALINE (ZOLOFT) 100 MG TABLET    Take 1 tablet by mouth daily    TRAZODONE (DESYREL) 50 MG TABLET    Take 1 tablet by mouth nightly     ALLERGIES     has No Known Allergies. FAMILY HISTORY     indicated that his mother is alive. SOCIAL HISTORY       Social History     Tobacco Use    Smoking status: Current Some Day Smoker     Packs/day: 0.50     Types: Cigarettes    Smokeless tobacco: Never Used   Substance Use Topics    Alcohol use: No    Drug use: Yes     Types: Marijuana, Cocaine     Comment: stopped in 2000; Pt reports crack/cocaine use on an every other day basis     PHYSICAL EXAM     INITIAL VITALS: BP (!) 146/80   Pulse 95   Temp 98.4 °F (36.9 °C) (Oral)   Resp 16   SpO2 97%    Physical Exam   Constitutional: He is oriented to person, place, and time. He appears well-developed and well-nourished. No distress. HENT:   Head: Normocephalic and atraumatic. Nose: Nose normal.   Eyes: Pupils are equal, round, and reactive to light. Conjunctivae and EOM are normal. Right eye exhibits no discharge. Left eye exhibits no discharge. Neck: Normal range of motion. Neck supple. No tracheal deviation present. Cardiovascular: Normal rate, regular rhythm, normal heart sounds and intact distal pulses. Pulmonary/Chest: Effort normal and breath sounds normal. No stridor. No respiratory distress. He has no wheezes. He has no rales. He exhibits no tenderness. Abdominal: Soft. Bowel sounds are normal. There is no tenderness. There is no rebound and no guarding. Musculoskeletal: Normal range of motion. He exhibits no edema or tenderness. Neurological: He is alert and oriented to person, place, and time. No cranial nerve deficit. Coordination normal.   Skin: Skin is warm and dry.  Capillary refill takes less than 2 seconds. No rash noted. He is not diaphoretic. No erythema. Psychiatric: His behavior is normal.   Suicidal thoughts, flat affect       MEDICAL DECISION MAKING:   Medically clear for psych admit         Procedures    DIAGNOSTIC RESULTS   LABS: All lab results were reviewed by myself, and all abnormals are listed below. Labs Reviewed   CBC WITH AUTO DIFFERENTIAL - Abnormal; Notable for the following components:       Result Value    Hematocrit 39.4 (*)     Monocytes 10 (*)     All other components within normal limits   COMPREHENSIVE METABOLIC PANEL - Abnormal; Notable for the following components:    Glucose 204 (*)     CREATININE 1.34 (*)     Alkaline Phosphatase 17 (*)     Total Protein 5.4 (*)     Alb 3.4 (*)     GFR Non- 56 (*)     All other components within normal limits   ETHANOL   URINE DRUG SCREEN       EMERGENCY DEPARTMENTCOURSE:         Vitals:    Vitals:    03/29/19 1108   BP: (!) 146/80   Pulse: 95   Resp: 16   Temp: 98.4 °F (36.9 °C)   TempSrc: Oral   SpO2: 97%         FINAL IMPRESSION      1. Suicidal thoughts    2.  Schizophrenia, unspecified type [facility])          DISPOSITION/PLAN   DISPOSITION Decision To Admit 03/29/2019 10:31:49 AM      Lindsey Brown MD  Attending Emergency Physician                    Lindsey Brown MD  03/29/19 8561

## 2019-03-29 NOTE — BH NOTE
Writer spoke to Bubba Burrell at [facility] to verify patient's percocet dose/ frequency.  Writer was informed that patient get script from a pain management NP.

## 2019-03-29 NOTE — GROUP NOTE
Group Therapy Note    Date: March 29    Group Start Time: 1600  Group End Time: 1630  Group Topic: Psychoeducation    INES Ku LPN    Patient did not attend group therapy despite encouragement to attend.

## 2019-03-30 LAB
ABSOLUTE EOS #: 0.1 K/UL (ref 0–0.4)
ABSOLUTE IMMATURE GRANULOCYTE: ABNORMAL K/UL (ref 0–0.3)
ABSOLUTE LYMPH #: 1.4 K/UL (ref 1–4.8)
ABSOLUTE MONO #: 0.4 K/UL (ref 0.1–1.3)
ALBUMIN SERPL-MCNC: 3.3 G/DL (ref 3.5–5.2)
ALBUMIN/GLOBULIN RATIO: ABNORMAL (ref 1–2.5)
ALP BLD-CCNC: 67 U/L (ref 40–129)
ALT SERPL-CCNC: 18 U/L (ref 5–41)
ANION GAP SERPL CALCULATED.3IONS-SCNC: 9 MMOL/L (ref 9–17)
AST SERPL-CCNC: 13 U/L
BASOPHILS # BLD: 0 % (ref 0–2)
BASOPHILS ABSOLUTE: 0 K/UL (ref 0–0.2)
BILIRUB SERPL-MCNC: 0.18 MG/DL (ref 0.3–1.2)
BUN BLDV-MCNC: 13 MG/DL (ref 6–20)
BUN/CREAT BLD: ABNORMAL (ref 9–20)
CALCIUM SERPL-MCNC: 8.8 MG/DL (ref 8.6–10.4)
CHLORIDE BLD-SCNC: 104 MMOL/L (ref 98–107)
CO2: 26 MMOL/L (ref 20–31)
CREAT SERPL-MCNC: 0.88 MG/DL (ref 0.7–1.2)
DIFFERENTIAL TYPE: ABNORMAL
EOSINOPHILS RELATIVE PERCENT: 2 % (ref 0–4)
GFR AFRICAN AMERICAN: >60 ML/MIN
GFR NON-AFRICAN AMERICAN: >60 ML/MIN
GFR SERPL CREATININE-BSD FRML MDRD: ABNORMAL ML/MIN/{1.73_M2}
GFR SERPL CREATININE-BSD FRML MDRD: ABNORMAL ML/MIN/{1.73_M2}
GLUCOSE BLD-MCNC: 232 MG/DL (ref 70–99)
HCT VFR BLD CALC: 39.7 % (ref 41–53)
HEMOGLOBIN: 13.5 G/DL (ref 13.5–17.5)
IMMATURE GRANULOCYTES: ABNORMAL %
LYMPHOCYTES # BLD: 31 % (ref 24–44)
MCH RBC QN AUTO: 28.9 PG (ref 26–34)
MCHC RBC AUTO-ENTMCNC: 33.9 G/DL (ref 31–37)
MCV RBC AUTO: 85.1 FL (ref 80–100)
MONOCYTES # BLD: 9 % (ref 1–7)
NRBC AUTOMATED: ABNORMAL PER 100 WBC
PDW BLD-RTO: 14.2 % (ref 11.5–14.9)
PLATELET # BLD: 299 K/UL (ref 150–450)
PLATELET ESTIMATE: ABNORMAL
PMV BLD AUTO: 7.9 FL (ref 6–12)
POTASSIUM SERPL-SCNC: 4.5 MMOL/L (ref 3.7–5.3)
RBC # BLD: 4.67 M/UL (ref 4.5–5.9)
RBC # BLD: ABNORMAL 10*6/UL
SEG NEUTROPHILS: 58 % (ref 36–66)
SEGMENTED NEUTROPHILS ABSOLUTE COUNT: 2.7 K/UL (ref 1.3–9.1)
SODIUM BLD-SCNC: 139 MMOL/L (ref 135–144)
TOTAL PROTEIN: 5.4 G/DL (ref 6.4–8.3)
WBC # BLD: 4.6 K/UL (ref 3.5–11)
WBC # BLD: ABNORMAL 10*3/UL

## 2019-03-30 PROCEDURE — 6370000000 HC RX 637 (ALT 250 FOR IP): Performed by: PSYCHIATRY & NEUROLOGY

## 2019-03-30 PROCEDURE — 1240000000 HC EMOTIONAL WELLNESS R&B

## 2019-03-30 PROCEDURE — 85025 COMPLETE CBC W/AUTO DIFF WBC: CPT

## 2019-03-30 PROCEDURE — 36415 COLL VENOUS BLD VENIPUNCTURE: CPT

## 2019-03-30 PROCEDURE — 80053 COMPREHEN METABOLIC PANEL: CPT

## 2019-03-30 RX ADMIN — TRAZODONE HYDROCHLORIDE 50 MG: 50 TABLET ORAL at 21:36

## 2019-03-30 RX ADMIN — GABAPENTIN 800 MG: 400 CAPSULE ORAL at 08:51

## 2019-03-30 RX ADMIN — OXYCODONE HYDROCHLORIDE 5 MG: 5 TABLET ORAL at 09:37

## 2019-03-30 RX ADMIN — OXYCODONE AND ACETAMINOPHEN 1 TABLET: 5; 325 TABLET ORAL at 09:37

## 2019-03-30 RX ADMIN — PANTOPRAZOLE SODIUM 40 MG: 40 TABLET, DELAYED RELEASE ORAL at 08:51

## 2019-03-30 RX ADMIN — SERTRALINE HYDROCHLORIDE 100 MG: 100 TABLET ORAL at 08:50

## 2019-03-30 RX ADMIN — OXYCODONE AND ACETAMINOPHEN 1 TABLET: 5; 325 TABLET ORAL at 16:46

## 2019-03-30 RX ADMIN — GABAPENTIN 800 MG: 400 CAPSULE ORAL at 16:46

## 2019-03-30 RX ADMIN — METFORMIN HYDROCHLORIDE 1000 MG: 1000 TABLET, FILM COATED ORAL at 08:51

## 2019-03-30 RX ADMIN — QUETIAPINE FUMARATE 600 MG: 300 TABLET ORAL at 21:36

## 2019-03-30 RX ADMIN — LISINOPRIL 5 MG: 5 TABLET ORAL at 08:51

## 2019-03-30 RX ADMIN — OXYCODONE HYDROCHLORIDE 5 MG: 5 TABLET ORAL at 16:45

## 2019-03-30 RX ADMIN — METFORMIN HYDROCHLORIDE 1000 MG: 1000 TABLET, FILM COATED ORAL at 16:46

## 2019-03-30 RX ADMIN — GABAPENTIN 800 MG: 400 CAPSULE ORAL at 21:36

## 2019-03-30 ASSESSMENT — ENCOUNTER SYMPTOMS
ABDOMINAL PAIN: 0
CONSTIPATION: 0
COUGH: 0
TROUBLE SWALLOWING: 0
VOMITING: 0
NAUSEA: 0
SHORTNESS OF BREATH: 0
DIARRHEA: 0
WHEEZING: 0

## 2019-03-30 ASSESSMENT — PAIN SCALES - GENERAL
PAINLEVEL_OUTOF10: 0
PAINLEVEL_OUTOF10: 0
PAINLEVEL_OUTOF10: 7
PAINLEVEL_OUTOF10: 0
PAINLEVEL_OUTOF10: 6

## 2019-03-30 NOTE — CARE COORDINATION
BHI Biopsychosocial Assessment    Current Level of Psychosocial Functioning     Independent - x per records  Dependent    Minimal Assist     Comments:    Psychosocial High Risk Factors (check all that apply)    Unable to obtain meds   Chronic illness/pain    Substance abuse - cocaine per other notation  Lack of Family Support   Financial stress   Isolation   Inadequate Community Resources  Suicide attempt(s)  Not taking medications - per records  Victim of crime   Developmental Delay  Unable to manage personal needs    Age 72 or older   Homeless  No transportation   Readmission within 30 days  Unemployment  Traumatic Event    Comments:   Psychiatric Advanced Directives: subha    Family to Tyler Blair in Treatment: subha    Sexual Orientation:  subha    Patient Strengths: subha    Patient Barriers: poor adherence and cooperation      Opiate Education Provided: n/a        CMHC/mental health history: [facility]    Plan of Care   medication management, group/individual therapies, family meetings, psycho -education, treatment team meetings to assist with stabilization    Initial Discharge Plan:  D/c home      Clinical Summary:  Per records, patient admitted with SI and command auditory hallucinations. Patient would not stay awake during assessment. Writer would ask a question and patient would give yes or no answers and would start snoring. Patient eventually just wouldn't answer any questions.

## 2019-03-30 NOTE — PLAN OF CARE
[facility]  Initial Interdisciplinary Treatment Plan NO      Original treatment plan Date & Time: 3/30/19 0930    Admission Type:  Admission Type: Voluntary    Reason for admission:   Reason for Admission: SI to stab self: auditory hallucinations to harm self    Estimated Length of Stay:  5-7days  Estimated Discharge Date: to be determined by physician    PATIENT STRENGTHS:  Patient Strengths:Strengths: Connection to output provider  Patient Strengths and Limitations:Limitations: Difficulty problem solving/relies on others to help solve problems, Multiple barriers to leisure interests  Addictive Behavior: Addictive Behavior  In the past 3 months, have you felt or has someone told you that you have a problem with:  : None  Do you have a history of Chemical Use?: No  Do you have a history of Alcohol Use?: No  Do you have a history of Street Drug Abuse?: Yes  Histroy of Prescripton Drug Abuse?: No  Medical Problems:  Past Medical History:   Diagnosis Date    Acute depression 9/8/2018    Back pain     DM2 (diabetes mellitus, type 2) ([facility] 4/21/2014    Generalized OA     GERD (gastroesophageal reflux disease)     HTN (hypertension)     Schizoaffective disorder, bipolar type ([facility] 3/29/2019    Unspecified sleep apnea     c  pap     Status EXAM:Status and Exam  Normal: Yes  Facial Expression: Flat  Affect: Appropriate  Level of Consciousness: Alert  Mood:Normal: Yes  Motor Activity:Normal: Yes  Interview Behavior: Cooperative  Preception: [place] to Person, [place] to Time, [place] to Place, [place] to Situation  Attention:Normal: No  Attention: Distractible  Thought Processes: Blocking  Thought Content:Normal: Yes  Hallucinations: None  Delusions: No  Memory:Normal: Yes  Insight and Judgment: No  Insight and Judgment: Poor Insight, Poor Judgment  Present Suicidal Ideation: No  Present Homicidal Ideation: No    EDUCATION:   Learner Progress Toward Treatment Goals: reviewed group plans and strategies for

## 2019-03-30 NOTE — PLAN OF CARE
Problem: Altered Mood, Psychotic Behavior:  Goal: Able to verbalize decrease in frequency and intensity of hallucinations  Description  Able to verbalize decrease in frequency and intensity of hallucinations  3/30/2019 3230 by Koko uQinonez  Outcome: Ongoing  Note:   Pt is accepting of 1:1 talk time with staff. Pt denies SI/HI and A/V hallucinations and verbally agrees to approach staff if thoughts of self harm arises. Pt is isolative to self and room. Pt admits to depression and anxiety. Reassurance and support provided. Q15 min checks maintained. Pt remains safe at this time. Problem: Altered Mood, Psychotic Behavior:  Goal: Absence of self-harm  Description  Absence of self-harm  3/30/2019 1973 by Koko Quinonez  Outcome: Ongoing  Note:   No self harm behaviors noted. Pt denies SI/HI and A/V hallucinations and verbally agrees to approach staff if thoughts of self harm arises. Q15 min checks maintained. Pt remains safe at this time.

## 2019-03-30 NOTE — BH NOTE
Psychoeducation Group Note    Date: 3/30/19  Start Time: 1600  End Time: 1630    Number Participants in Group:  9    Goal:  Patient will demonstrate increased interpersonal interaction   Topic: feedback group    Discipline Responsible:   OT  AT  [facility].  RT x Other       Participation Level:     None  Minimal   x Active Listener  Interactive    Monopolizing         Participation Quality:  x Appropriate  Inappropriate   x       Attentive        Intrusive          Sharing        Resistant          Supportive        Lethargic       Affective:    Congruent  Incongruent  Blunted  Flat    Constricted  Anxious  Elated  Angry    Labile  Depressed  Other         Cognitive:  x Alert x Oriented PPTP     Concentration x G  F  P   Attention Span x G  F  P   Short-Term Memory x G  F  P   Long-Term Memory x G  F  P   ProblemSolving/  Decision Making x G  F  P   Ability to Process  Information x G  F  P      Contributing Factors             Delusional             Hallucinating             Flight of Ideas             Other:       Modes of Intervention:  x Education  Support  Exploration    Clarifying  Problem Solving  Confrontation   x Socialization  Limit Setting  Reality Testing   x Activity  Movement  Media    Other:            Response to Learning:  x Able to verbalize current knowledge/experience   x Able to verbalize/acknowledge new learning    Able to retain information    Capable of insight    Able to change behavior    Progressing to goal    Other:        Comments:

## 2019-03-30 NOTE — PLAN OF CARE
Problem: Altered Mood, Psychotic Behavior:  Goal: Able to verbalize decrease in frequency and intensity of hallucinations  Description  Able to verbalize decrease in frequency and intensity of hallucinations  Outcome: Ongoing  Note:   Pt denies hallucinations at this time. Problem: Altered Mood, Psychotic Behavior:  Goal: Absence of self-harm  Description  Absence of self-harm  Outcome: Ongoing  Note:   Pt denies thoughts of self-harm at this time. Pt is out but aloof of peers.

## 2019-03-30 NOTE — GROUP NOTE
Group Therapy Note    Date: March 30    Group Start Time: 3627  Group End Time: 3191  Group Topic: Community Meeting    INES Rashid Alexander, [place]    Pt did not attend [id number] d/t resting in room despite staff invitation to attend.

## 2019-03-30 NOTE — H&P
HISTORY and [address]       NAME:  Margo Alvarado  MRN: [id number]   YOB: 1965   Date: 3/30/2019   Age: 48 y.o. Gender: male     COMPLAINT AND PRESENT HISTORY:      Margo Alvarado is 48 y.o.,  male, admitted because of Schizoaffective Disorder/ Schizophrenia. Patient admitted via the ED with SI plan to stab himself. Denies current SI/HI. He states to examiner \"I gotta get out of here. \" He states is afraid of people breaking into his house and stealing his TV's. Pt admits to auditory hallucinations, patient hears voices that tell him to harm himself. Pt denies visual or tactile hallucinations. He has hx of previous suicide attempts. Pt has poor insight. Pt has poor sleep, loss of appetite, poor concentration and memory. Patient denies any current alcohol or substance abuse to examiner. Tox screen positive for cocaine. Patient lives alone. Pt has been non compliant with his medications for 2 weeks. He is drowsy during H&P, but states his right wrist \"hurts,\" describes as aching. Worse with making a . He denies any trauma. Pain is moderate, worse with movement better at rest. No other associated symptoms.      DIAGNOSTIC RESULTS   Labs:  CBC:   Recent Labs     03/29/19  1112 03/30/19  0719   WBC 5.1 4.6   HGB 13.5 13.5    299     BMP:    Recent Labs     03/29/19  1112 03/30/19  0719    139   K 4.5 4.5    104   CO2 24 26   BUN 19 13   CREATININE 1.34* 0.88   GLUCOSE 204* 232*     Hepatic:   Recent Labs     03/29/19  1112 03/30/19  0719   AST 17 13   ALT 21 18   BILITOT <0.15* 0.18*   ALKPHOS 17* 67       PAST MEDICAL HISTORY     Past Medical History:   Diagnosis Date    Acute depression 9/8/2018    Back pain     DM2 (diabetes mellitus, type 2) ([facility] 4/21/2014    Generalized OA     GERD (gastroesophageal reflux disease)     HTN (hypertension)     Schizoaffective disorder, bipolar type ([facility] 3/29/2019    Unspecified sleep apnea     c daily as needed for Anxiety 60 tablet 0    gabapentin (NEURONTIN) 800 MG tablet Take 1 tablet by mouth 3 times daily for 30 days. . 90 tablet 0    sertraline (ZOLOFT) 100 MG tablet Take 1 tablet by mouth daily 30 tablet 0    traZODone (DESYREL) 50 MG tablet Take 1 tablet by mouth nightly 30 tablet 0    metFORMIN (GLUCOPHAGE) 1000 MG tablet Take 1 tablet by mouth 2 times daily 60 tablet 0    QUEtiapine (SEROQUEL) 300 MG tablet Take 2 tablets by mouth nightly 60 tablet 0    pantoprazole (PROTONIX) 40 MG tablet Take 1 tablet by mouth every morning (before breakfast) 30 tablet 0    oxyCODONE-acetaminophen (PERCOCET)  MG per tablet Take 1 tablet by mouth every 6 hours as needed for Pain. Maximum of 4 tabs a day- prescribed by pain management NP      lisinopril (PRINIVIL;ZESTRIL) 5 MG tablet Take 5 mg by mouth daily          Review of Systems   Constitutional: Positive for fever. Negative for chills. HENT: Positive for dental problem (poor dentition). Negative for congestion, postnasal drip and trouble swallowing. Respiratory: Negative for cough, shortness of breath and wheezing. Cardiovascular: Negative for chest pain and palpitations. Gastrointestinal: Negative for abdominal pain, constipation, diarrhea, nausea and vomiting. Genitourinary: Negative for difficulty urinating, frequency and urgency. Musculoskeletal: Positive for arthralgias (right wrist, back). Neurological: Negative for dizziness, weakness and light-headedness. Psychiatric/Behavioral: Positive for decreased concentration, hallucinations, sleep disturbance and suicidal ideas. GENERAL PHYSICAL EXAM:     Vitals: BP (!) 147/91   Pulse 79   Temp 97.8 °F (36.6 °C) (Oral)   Resp 14   Ht 5' 5\" (1.651 m)   Wt 230 lb (104.3 kg)   SpO2 97%   BMI 38.27 kg/m²  Body mass index is 38.27 kg/m². Pt was examined with a nurse present in the room.      GENERAL APPEARANCE:  Tiffany Navarrete is 48 y.o.,  male, moderately obese, nourished, conscious, alert. Does not appear to be distress or pain at this time. SKIN:  Warm, dry, no cyanosis or jaundice. HEAD:  Normocephalic, atraumatic, no swelling or tenderness. EYES:  Scleral redness noted bilaterally. No exudate. Lids not edematous. Pupils equal, reactive to light, Conjunctiva is clear, EOMs intact conner. eyelids WNL. EARS:  No discharge, no marked hearing loss. NOSE:  No rhinorrhea, epistaxis or septal deformity. THROAT:  Not congested. No ulceration bleeding or discharge. NECK:  No stiffness, trachea central.    CHEST:  Symmetrical and equal on expansion. HEART:  Hypertensive. Regular rate and rhythm. S1 > S2, No audible murmurs or gallops. LUNGS:  Equal on expansion, normal breath sounds. ABDOMEN:  Soft on palpation. No localized tenderness. No guarding or rigidity. LYMPHATICS:  No palpable anterior cervical lymphadenopathy. LOCOMOTOR, BACK AND SPINE:  Lumbar paraspinals with tenderness. EXTREMITIES: Right  strength mildly diminished as compared to left. Able to flex and extend wrist actively. Mild tenderness to medial right wrist. No edema. No visible erythema or ecchymosis. Symmetrical, no pedal edema. No calf tenderness. No discoloration or ulcerations. NEUROLOGIC:  The patient is conscious, alert, oriented, Gait and balance not observed. No apparent focal sensory deficits. No motor deficits, muscle strength equal Conner. No facial droop, tongue protrudes centrally, no slurring of the speech. Cranial nerves 3-12 reveal no deficits. PROVISIONAL DIAGNOSES:      Principal Problem:    Schizoaffective disorder, bipolar type ([facility]  Resolved Problems:    * No resolved hospital problems.  *      KRISTINA Forrest CNP on 3/30/2019 at 11:55 AM

## 2019-03-31 PROCEDURE — 6370000000 HC RX 637 (ALT 250 FOR IP): Performed by: PSYCHIATRY & NEUROLOGY

## 2019-03-31 PROCEDURE — 1240000000 HC EMOTIONAL WELLNESS R&B

## 2019-03-31 RX ADMIN — PANTOPRAZOLE SODIUM 40 MG: 40 TABLET, DELAYED RELEASE ORAL at 07:42

## 2019-03-31 RX ADMIN — GABAPENTIN 800 MG: 400 CAPSULE ORAL at 14:38

## 2019-03-31 RX ADMIN — SERTRALINE HYDROCHLORIDE 100 MG: 100 TABLET ORAL at 07:42

## 2019-03-31 RX ADMIN — GABAPENTIN 800 MG: 400 CAPSULE ORAL at 07:42

## 2019-03-31 RX ADMIN — OXYCODONE AND ACETAMINOPHEN 1 TABLET: 5; 325 TABLET ORAL at 18:08

## 2019-03-31 RX ADMIN — OXYCODONE AND ACETAMINOPHEN 1 TABLET: 5; 325 TABLET ORAL at 05:56

## 2019-03-31 RX ADMIN — OXYCODONE HYDROCHLORIDE 5 MG: 5 TABLET ORAL at 05:56

## 2019-03-31 RX ADMIN — METFORMIN HYDROCHLORIDE 1000 MG: 1000 TABLET, FILM COATED ORAL at 07:42

## 2019-03-31 RX ADMIN — TRAZODONE HYDROCHLORIDE 50 MG: 50 TABLET ORAL at 19:51

## 2019-03-31 RX ADMIN — METFORMIN HYDROCHLORIDE 1000 MG: 1000 TABLET, FILM COATED ORAL at 17:20

## 2019-03-31 RX ADMIN — GABAPENTIN 800 MG: 400 CAPSULE ORAL at 19:51

## 2019-03-31 RX ADMIN — OXYCODONE AND ACETAMINOPHEN 1 TABLET: 5; 325 TABLET ORAL at 11:59

## 2019-03-31 RX ADMIN — QUETIAPINE FUMARATE 600 MG: 300 TABLET ORAL at 19:51

## 2019-03-31 RX ADMIN — LISINOPRIL 5 MG: 5 TABLET ORAL at 07:42

## 2019-03-31 RX ADMIN — OXYCODONE HYDROCHLORIDE 5 MG: 5 TABLET ORAL at 18:08

## 2019-03-31 RX ADMIN — OXYCODONE HYDROCHLORIDE 5 MG: 5 TABLET ORAL at 11:59

## 2019-03-31 ASSESSMENT — PAIN SCALES - GENERAL
PAINLEVEL_OUTOF10: 4
PAINLEVEL_OUTOF10: 6
PAINLEVEL_OUTOF10: 7
PAINLEVEL_OUTOF10: 0
PAINLEVEL_OUTOF10: 6
PAINLEVEL_OUTOF10: 6

## 2019-03-31 ASSESSMENT — PAIN DESCRIPTION - LOCATION: LOCATION: BACK

## 2019-03-31 NOTE — BH NOTE
PSYCHOEDUCATION GROUP NOTE       Date:   3/31/19          Start Time:    9:00                     End Time: 9:30      Number Participants in Group: 6      Name of group:  Goal Setting Group        RT  SW  Nsg  LPN  x BHTII  Other       Participation Level:     None  Minimal   x Active Listener x Interactive    Monopolizing         Participation Quality:  x Appropriate  Inappropriate   x  Attentive   Intrusive   x  Sharing   Resistant     Supportive    Lethargic       Affective:   x Congruent  Incongruent  Blunted  Flat    Constricted  Anxious  Elated  Angry    Labile  Depressed  Other         Cognitive:  x Alert  Oriented PPTS     Concentration G x F  P    Attention Span G  F  P    Short-Term Memory G  F  P    Long-Term Memory G  F  P    ProblemSolving/  Decision Making G x F  P    Ability to Process  Information G  F  P       Contributing Factors             Delusional             Hallucinating             Flight of Ideas             Other: poor concentration       Modes of Intervention:  x Education x Support  Exploration    Clarifying x Problem Solving  Confrontation    Socialization  Limit Setting  Reality Testing    Activity  Movement  Media    Other:          Response to Learning:  x Able to verbalize current knowledge/experience    Able to verbalize/acknowledge new learning    Able to retain information   x Capable of insight    Able to change behavior    Progressing to goal    Other:        Comments:

## 2019-03-31 NOTE — GROUP NOTE
Group Therapy Note    Date: March 31    Group Start Time: 1100  Group End Time: 0087  Group Topic: Psychoeducation    INES Frank, CTRS    Pt did not attend RT group at 1100 d/t resting in room despite staff invitation to attend.

## 2019-03-31 NOTE — BH NOTE
All assessments and charting done by LPN reviewed.     Electronically signed by Estella Meyer RN on 3/30/2019 at 11:51 PM

## 2019-03-31 NOTE — PLAN OF CARE
Patient is out on the unit much of the day, but aloof of peers and staff and affect is flat. Patient continues to complain of pain but is able to get relief from prescribed pain medications. Patient stated he is still having slight depression but that he feels ready to go home. Patient attends select groups and showered today. Patient denies hallucinations as well as any thoughts to harm self or others. Patient took all medications as prescribed and patient safety maintained. Patient did become irritable about the phone cords being so short but was able to be redirected and self calmed.

## 2019-03-31 NOTE — PLAN OF CARE
PSYCHOEDUCATION GROUP NOTE    Date: 3/31/19  Start Time: 1330  End Time: 1410    Number Participants in Group:  10/16    Goal:  Patient will demonstrate increased interpersonal interaction   Topic: Happiness/Self Awareness     Discipline Responsible:   OT  AT    Ns. x RT MHP Other       Participation Level:     None  Minimal    Active Listener x Interactive    Monopolizing         Participation Quality:  x Appropriate  Inappropriate          Attentive        Intrusive   x       Sharing        Resistant          Supportive        Lethargic       Affective:    Congruent  Incongruent  Blunted  Flat   x Constricted  Anxious  Elated  Angry    Labile  Depressed  Other         Cognitive:  x Alert x Oriented PPTP     Concentration  G x F  P   Attention Span  G x F  P   Short-Term Memory x G  F  P   Long-Term Memory x G  F  P   ProblemSolving/  Decision Making x G  F  P   Ability to Process  Information x G  F  P      Contributing Factors             Delusional             Hallucinating             Flight of Ideas             Other:       Modes of Intervention:   Education x Support x Exploration    Clarifying x Problem Solving  Confrontation   x Socialization  Limit Setting x Reality Testing   x Activity  Movement x Media    Other:            Response to Learning:  x Able to verbalize current knowledge/experience    Able to verbalize/acknowledge new learning    Able to retain information    Capable of insight    Able to change behavior   x Progressing to goal    Other:        Comments:

## 2019-03-31 NOTE — PLAN OF CARE
Problem: Pain:  Goal: Pain level will decrease  Description  Pain level will decrease  Outcome: Ongoing  Note:   Reports chronic pain that is controlled by medication. Problem: Risk of Harm:  Goal: Ability to remain free from injury will improve  Description  Ability to remain free from injury will improve  Outcome: Ongoing  Note:   Denies suicidal and homicidal ideations. Problem: Altered Mood, Psychotic Behavior:  Goal: Able to verbalize decrease in frequency and intensity of hallucinations  Description  Able to verbalize decrease in frequency and intensity of hallucinations  3/30/2019 2039 by Ena Coronado LPN  Outcome: Ongoing  Note:   Denies auditory and visual hallucinations. 3/30/2019 9595 by Adolfo Arciniega  Outcome: Ongoing  Note:   Pt is accepting of 1:1 talk time with staff. Pt denies SI/HI and A/V hallucinations and verbally agrees to approach staff if thoughts of self harm arises. Pt is isolative to self and room. Pt admits to depression and anxiety. Reassurance and support provided. Q15 min checks maintained. Pt remains safe at this time.   3/30/2019 0753 by LEXIE Reid  Outcome: Ongoing

## 2019-03-31 NOTE — PLAN OF CARE
[address] [place] [address]  Day 3 Interdisciplinary Treatment Plan NOTE    Review Date & Time: 3/31/19 9:44am    Admission Type:   Admission Type: Voluntary    Reason for admission:  Reason for Admission: SI to stab self: auditory hallucinations to harm self  Estimated Length of Stay:  5-7 days  Estimated Discharge Date Update:   to be determined by physician    PATIENT STRENGTHS:  Patient Strengths:Strengths: Connection to output provider  Patient Strengths and Limitations:Limitations: Difficulty problem solving/relies on others to help solve problems, Multiple barriers to leisure interests  Addictive Behavior:Addictive Behavior  In the past 3 months, have you felt or has someone told you that you have a problem with:  : None  Do you have a history of Chemical Use?: No  Do you have a history of Alcohol Use?: No  Do you have a history of Street Drug Abuse?: Yes  Histroy of Prescripton Drug Abuse?: No  Medical Problems:  Past Medical History:   Diagnosis Date    Acute depression 9/8/2018    Back pain     DM2 (diabetes mellitus, type 2) ([facility] 4/21/2014    Generalized OA     GERD (gastroesophageal reflux disease)     HTN (hypertension)     Schizoaffective disorder, bipolar type ([facility] 3/29/2019    Unspecified sleep apnea     c  pap       Risk:  Fall RiskTotal: 77  Mick Scale Mick Scale Score: 23  BVC Total: 0  Change in scores:  No Changes to plan of Care:  No    Status EXAM:   Status and Exam  Normal: No  Facial Expression: Avoids Gaze, Flat  Affect: Appropriate  Level of Consciousness: Alert  Mood:Normal: No  Mood: Depressed  Motor Activity:Normal: Yes  Motor Activity: Decreased  Interview Behavior: Evasive  Preception: [place] to Person, [place] to Time, [place] to Place, [place] to Situation  Attention:Normal: No  Attention: Distractible  Thought Processes: Blocking  Thought Content:Normal: No  Thought Content: Poverty of Content  Hallucinations: None  Delusions: No  Memory:Normal: Yes  Insight and Judgment: No  Insight and Judgment: Unmotivated, Poor Insight  Present Suicidal Ideation: No  Present Homicidal Ideation: No    Daily Assessment Last Entry:   Daily Sleep (WDL): Within Defined Limits         Patient Currently in Pain: Yes  Daily Nutrition (WDL): Within Defined Limits    Patient Monitoring:  Frequency of Checks: 4 times per hour, close    Psychiatric Symptoms:   Depression Symptoms  Depression Symptoms: Impaired concentration, Isolative, Loss of interest  Anxiety Symptoms  Anxiety Symptoms: Generalized  Zuleima Symptoms  Zuleima Symptoms: No problems reported or observed. Psychosis Symptoms  Delusion Type: No problems reported or observed. Suicide Risk CSSR-S:  1) Within the past month, have you wished you were dead or wished you could go to sleep and not wake up? : Yes  2) Have you actually had any thoughts of killing yourself? : Yes  3) Have you been thinking about how you might kill yourself? : Yes  5) Have you started to work out or worked out the details of how to kill yourself? Do you intend to carry out this plan? : No  6) Have you ever done anything, started to do anything, or prepared to do anything to end your life?: Yes  Change in Result:  none Change in Plan of care:  none      EDUCATION:   Learner Progress Toward Treatment Goals:   Reviewed results and recommendations of this team, Reviewed group plan and strategies, Reviewed signs, symptoms and risk of self harm and violent behavior, Reviewed goals and plan of care    Method:  small group, individual verbal education    Outcome:   Verbalized by patient but needs reinforcement to obtain goals    PATIENT GOALS:  Short term:  Maintain medication adherence. Long term:  Maintain medication adherence. Maintain positive coping.     PLAN/TREATMENT RECOMMENDATIONS UPDATE:  continue with group therapies, increased socialization, continue planning for after discharge goals, continue with medication compliance    SHORT-TERM GOALS UPDATE:   Time frame for Short-Term Goals:  5-7 days    LONG-TERM GOALS UPDATE:   Time frame for Long-Term Goals:  6 months    Members Present in Team Meeting:   See signature sheet  Arya Yoon MSW, LSW\

## 2019-04-01 VITALS
SYSTOLIC BLOOD PRESSURE: 152 MMHG | HEIGHT: 65 IN | BODY MASS INDEX: 38.32 KG/M2 | HEART RATE: 95 BPM | TEMPERATURE: 98.1 F | RESPIRATION RATE: 14 BRPM | OXYGEN SATURATION: 93 % | WEIGHT: 230 LBS | DIASTOLIC BLOOD PRESSURE: 88 MMHG

## 2019-04-01 PROCEDURE — 6370000000 HC RX 637 (ALT 250 FOR IP): Performed by: PSYCHIATRY & NEUROLOGY

## 2019-04-01 PROCEDURE — 5130000000 HC BRIDGE APPOINTMENT

## 2019-04-01 RX ORDER — PANTOPRAZOLE SODIUM 40 MG/1
40 TABLET, DELAYED RELEASE ORAL
Qty: 30 TABLET | Refills: 0 | Status: ON HOLD | OUTPATIENT
Start: 2019-04-01 | End: 2020-01-11 | Stop reason: HOSPADM

## 2019-04-01 RX ORDER — SERTRALINE HYDROCHLORIDE 100 MG/1
100 TABLET, FILM COATED ORAL DAILY
Qty: 30 TABLET | Refills: 0 | Status: ON HOLD | OUTPATIENT
Start: 2019-04-01 | End: 2021-12-10 | Stop reason: SDUPTHER

## 2019-04-01 RX ORDER — QUETIAPINE FUMARATE 300 MG/1
600 TABLET, FILM COATED ORAL NIGHTLY
Qty: 60 TABLET | Refills: 0 | Status: ON HOLD | OUTPATIENT
Start: 2019-04-01 | End: 2020-03-24 | Stop reason: HOSPADM

## 2019-04-01 RX ORDER — LISINOPRIL 5 MG/1
5 TABLET ORAL DAILY
Qty: 30 TABLET | Refills: 0 | Status: ON HOLD | OUTPATIENT
Start: 2019-04-01 | End: 2020-01-06

## 2019-04-01 RX ORDER — GABAPENTIN 800 MG/1
800 TABLET ORAL 3 TIMES DAILY
Qty: 90 TABLET | Refills: 0 | Status: ON HOLD | OUTPATIENT
Start: 2019-04-01 | End: 2020-03-21 | Stop reason: ALTCHOICE

## 2019-04-01 RX ORDER — TRAZODONE HYDROCHLORIDE 50 MG/1
50 TABLET ORAL NIGHTLY
Qty: 30 TABLET | Refills: 0 | Status: ON HOLD | OUTPATIENT
Start: 2019-04-01 | End: 2020-01-11 | Stop reason: HOSPADM

## 2019-04-01 RX ADMIN — GABAPENTIN 800 MG: 400 CAPSULE ORAL at 15:35

## 2019-04-01 RX ADMIN — PANTOPRAZOLE SODIUM 40 MG: 40 TABLET, DELAYED RELEASE ORAL at 08:39

## 2019-04-01 RX ADMIN — OXYCODONE AND ACETAMINOPHEN 1 TABLET: 5; 325 TABLET ORAL at 12:01

## 2019-04-01 RX ADMIN — LISINOPRIL 5 MG: 5 TABLET ORAL at 08:39

## 2019-04-01 RX ADMIN — GABAPENTIN 800 MG: 400 CAPSULE ORAL at 08:39

## 2019-04-01 RX ADMIN — SERTRALINE HYDROCHLORIDE 100 MG: 100 TABLET ORAL at 08:39

## 2019-04-01 RX ADMIN — OXYCODONE AND ACETAMINOPHEN 1 TABLET: 5; 325 TABLET ORAL at 06:03

## 2019-04-01 RX ADMIN — OXYCODONE HYDROCHLORIDE 5 MG: 5 TABLET ORAL at 06:03

## 2019-04-01 RX ADMIN — METFORMIN HYDROCHLORIDE 1000 MG: 1000 TABLET, FILM COATED ORAL at 08:39

## 2019-04-01 RX ADMIN — OXYCODONE HYDROCHLORIDE 5 MG: 5 TABLET ORAL at 12:01

## 2019-04-01 ASSESSMENT — PAIN DESCRIPTION - DESCRIPTORS
DESCRIPTORS: ACHING;DISCOMFORT
DESCRIPTORS: ACHING

## 2019-04-01 ASSESSMENT — PAIN DESCRIPTION - FREQUENCY
FREQUENCY: CONTINUOUS
FREQUENCY: CONTINUOUS

## 2019-04-01 ASSESSMENT — PAIN DESCRIPTION - ONSET
ONSET: ON-GOING
ONSET: ON-GOING

## 2019-04-01 ASSESSMENT — PAIN - FUNCTIONAL ASSESSMENT: PAIN_FUNCTIONAL_ASSESSMENT: PREVENTS OR INTERFERES SOME ACTIVE ACTIVITIES AND ADLS

## 2019-04-01 ASSESSMENT — PAIN DESCRIPTION - PAIN TYPE
TYPE: CHRONIC PAIN
TYPE: CHRONIC PAIN

## 2019-04-01 ASSESSMENT — PAIN DESCRIPTION - LOCATION
LOCATION: BACK
LOCATION: BACK

## 2019-04-01 ASSESSMENT — PAIN SCALES - GENERAL
PAINLEVEL_OUTOF10: 7
PAINLEVEL_OUTOF10: 2
PAINLEVEL_OUTOF10: 2
PAINLEVEL_OUTOF10: 6

## 2019-04-01 ASSESSMENT — PAIN DESCRIPTION - ORIENTATION
ORIENTATION: LOWER
ORIENTATION: LOWER

## 2019-04-01 NOTE — BH NOTE
EVENING GROUP NOTE    Date:  03//31/19 Start Time: 8:15pm  End Time: 8:45pm    Number Participants in Group:  11/15    Goal:  Patient will demonstrate increased interpersonal interaction   Topic:  Wrap Up and Relaxation Groups    Discipline Responsible:   OT  AT   x Nsg.  RT  Other       Participation Level:     None  Minimal   x Active Listener x Interactive    Monopolizing         Participation Quality:  x Appropriate  Inappropriate   x       Attentive        Intrusive   x       Sharing        Resistant   x       Supportive        Lethargic       Affective:   x Congruent  Incongruent  Blunted  Flat    Constricted  Anxious  Elated  Angry    Labile  Depressed  Other         Cognitive:  x Alert  Oriented PPTP     Concentration x G  F  P   Attention Span x G  F  P   Short-Term Memory x G  F  P   Long-Term Memory x G  F  P   ProblemSolving/  Decision Making x G  F  P   Ability to Process  Information x G  F  P      Contributing Factors             Delusional             Hallucinating             Flight of Ideas             Other:       Modes of Intervention:  x Education  Support  Exploration   x Clarifying  Problem Solving  Confrontation    Socialization  Limit Setting  Reality Testing    Activity  Movement  Media    Other:            Response to Learning:  x Able to verbalize current knowledge/experience   x Able to verbalize/acknowledge new learning    Able to retain information    Capable of insight    Able to change behavior    Progressing to goal    Other:        Comments:  Pt attended and participated in  Wrap Up and Relaxation Groups this evening.

## 2019-04-01 NOTE — GROUP NOTE
Group Therapy Note    Date: April 1    Group Start Time: 1430  Group End Time: 1500  Group Topic: Cognitive Skills    INES Simpson, CTRS      Group Therapy Note    Pt did not attend Therapeutic Recreation at 1430 d/t resting in room despite staff invitation to attend.

## 2019-04-01 NOTE — BH NOTE
All assessments and charting done by LPN reviewed.     Electronically signed by Marian Espino RN on 4/1/2019 at 5:19 AM

## 2019-04-01 NOTE — PLAN OF CARE
Psychotherapy Group Note    Date: 4/1/2019  Start Time: 1000  End Time: 1045    Number Participants in Group:  10/15    Goal:  Patient will demonstrate increased interpersonal interaction   Topic: [place] [facility]    Discipline Responsible:   OT  AT X SW  Nsg.  RT  Other       Participation Level:     None  Minimal   X Active Listener X Interactive    Monopolizing         Participation Quality:  X Appropriate  Inappropriate   X       Attentive        Intrusive   X       Sharing        Resistant   X       Supportive        Lethargic       Affective:   X Congruent  Incongruent  Blunted    Flat    Constricted  Anxious  Elated    Angry    Labile  Depressed  Other           Cognitive:  X Alert X Oriented PPTP     Concentration  G X F  P   Attention Span  G X F  P   Short-Term Memory  G X F  P   Long-Term Memory  G X F  P   ProblemSolving/  Decision Making  G X F  P   Ability to Process  Information  G X F  P      Contributing Factors             Delusional             Hallucinating             Flight of Ideas             Other:       Modes of Intervention:  X Education X Support X Exploration   X Clarifying X Problem Solving X Confrontation   X Socialization X Limit Setting X Reality Testing    Activity  Movement  Media    Other:            Response to Learning:  X Able to verbalize current knowledge/experience   X Able to verbalize/acknowledge new learning   X Able to retain information    Capable of insight    Able to change behavior   X Progressing to goal    Other:        Comments:

## 2019-04-01 NOTE — BH NOTE
Patient given tobacco quitline number 99416319685 at this time, refusing to call at this time, states \" I just dont want to quit now\"- patient given information as to the dangers of long term tobacco use. Continue to reinforce the importance of tobacco cessation.

## 2019-04-01 NOTE — CARE COORDINATION
Bridge Appointment completed: Reviewed Discharge Instructions with patient. Patient verbalizes understanding and agreement with the discharge plan using the teachback method. Discharge Arrangements: Pt is linked to [facility] for follow-up services. Guardian notified: N/A  Discharge destination/address: Pt will return home ([address] [facility], [place], [facility], [address]). Transported by: Pt will require transportation via cab services.

## 2019-04-01 NOTE — BH NOTE
[facility]  Discharge Note    Pt discharged with followings belongings:   Dentures: None  Vision - Corrective Lenses: Glasses  Hearing Aid: None  Jewelry: None  Body Piercings Removed: N/A  Clothing: Footwear, Jacket / coat, Pants, Shirt, Undergarments (Comment)  Were All Patient Medications Collected?: Not Applicable  Other Valuables: Cell phone, Money (Comment)   Valuables sent home with patient. Valuables retrieved from safe, Security envelope number:  [id number] and returned to patient. Patient left department with Departure Mode: By self, In cab via Mobility at Departure: Ambulatory, discharged to Discharged to: Private Residence. Patient education on aftercare instructions: yes  Information faxed to [facility] by staff Patient verbalize understanding of AVS:  yes. Status EXAM upon discharge:  Status and Exam  Normal: No  Facial Expression: Avoids Gaze, Flat  Affect: Appropriate  Level of Consciousness: Alert  Mood:Normal: No  Mood: Depressed  Motor Activity:Normal: No  Motor Activity: Decreased  Interview Behavior: Evasive, Cooperative  Preception: [place] to Person, [place] Stare to Time, Reading to Place, Reading to Situation  Attention:Normal: No  Attention: Distractible  Thought Processes: Blocking  Thought Content:Normal: No  Thought Content: Preoccupations  Hallucinations:  Auditory (Comment)(mumbles)  Delusions: No  Memory:Normal: Yes  Insight and Judgment: No  Insight and Judgment: Poor Judgment, Poor Insight, Unmotivated  Present Suicidal Ideation: No  Present Homicidal Ideation: No    Imelda Christine LPN

## 2019-04-02 NOTE — CARE COORDINATION
Name: Scooter Barnes    : 1965    Discharge Date: 19    Primary Auth/Cert #: [id number]    Discharge Medications:      Medication List      CONTINUE taking these medications    gabapentin 800 MG tablet  Commonly known as:  NEURONTIN  Take 1 tablet by mouth 3 times daily for 30 days. Notes to patient:  For neuropathy pain     lisinopril 5 MG tablet  Commonly known as:  PRINIVIL;ZESTRIL  Take 1 tablet by mouth daily  Notes to patient: For blood pressure     metFORMIN 1000 MG tablet  Commonly known as:  GLUCOPHAGE  Take 1 tablet by mouth 2 times daily  Notes to patient:  Treats type 2 diabetes     oxyCODONE-acetaminophen  MG per tablet  Commonly known as:  PERCOCET  Notes to patient:  For pain     pantoprazole 40 MG tablet  Commonly known as:  PROTONIX  Take 1 tablet by mouth every morning (before breakfast)  Notes to patient:  Used to help reduce acid reflux     QUEtiapine 300 MG tablet  Commonly known as:  SEROQUEL  Take 2 tablets by mouth nightly  Notes to patient:  Clears thoughts     sertraline 100 MG tablet  Commonly known as:  ZOLOFT  Take 1 tablet by mouth daily  Notes to patient:   For mood     traZODone 50 MG tablet  Commonly known as:  DESYREL  Take 1 tablet by mouth nightly  Notes to patient:  For sleep        STOP taking these medications    hydrOXYzine 25 MG tablet  Commonly known as:  ATARAX     nicotine 7 MG/24HR  Commonly known as:  [place] [employer]           Where to Get Your Medications      These medications were sent to [address] [place] [address]    Phone:  [phone]   · gabapentin 800 MG tablet  · lisinopril 5 MG tablet  · metFORMIN 1000 MG tablet  · pantoprazole 40 MG tablet  · QUEtiapine 300 MG tablet  · sertraline 100 MG tablet  · traZODone 50 MG tablet         Follow Up Appointment: Willard Schafer MD  [address] [place] [address] [address] [facility] [address] [phone]      As needed    [address] [facility],[address] [facility]  [facility]  [phone]    Go on 4/15/2019  Hospital discharge @ 9:20 am

## 2019-05-02 NOTE — PROGRESS NOTES
Medication History completed:    New medications: Percocet, nicotine patches    Medications discontinued: simvastatin, lisinopril, Tradjenta    Changes to dosing: none    Stated allergies: NKDA    Other pertinent information: Medications confirmed with [employer] and [employer] [facility]. The patient states he has been off all medications except metformin and gabapentin for about 10 days. Medications removed have not been filled by either of his pharmacies. The patient states he used crack 3 days ago.     Thank you,  Oral Jaeger, PharmD  [phone]
PSYCHOEDUCATION GROUP NOTE    Date: 4/1/2019    Start Time: 1100  End Time: 1130    Number Participants in Group:  8/16    Goal:  Patient will demonstrate increased interpersonal interaction   Topic: Leisure Awareness     Discipline Responsible:   OT  AT  [facility]. x RT MHP Other       Participation Level:     None  Minimal   x Active Listener  Interactive    Monopolizing         Participation Quality:   Appropriate  Inappropriate          Attentive        Intrusive          Sharing x       Resistant          Supportive        Lethargic       Affective:    Congruent  Incongruent x Blunted  Flat    Constricted  Anxious  Elated  Angry    Labile  Depressed  Other         Cognitive:  x Alert x Oriented PPTP     Concentration  G x F  P   Attention Span  G x F  P   Short-Term Memory  G x F  P   Long-Term Memory  G x F  P   ProblemSolving/  Decision Making  G x F  P   Ability to Process  Information  G x F  P      Contributing Factors             Delusional             Hallucinating             Flight of Ideas             Other:       Modes of Intervention:  x Education  Support  Exploration    Clarifying x Problem Solving  Confrontation   x Socialization  Limit Setting x Reality Testing   x Activity  Movement  Media    Other:            Response to Learning:   Able to verbalize current knowledge/experience    Able to verbalize/acknowledge new learning    Able to retain information    Capable of insight    Able to change behavior   x Progressing to goal    Other:        Comments: Patient attended group but did not engage despite staff encouragement.
Patient states that he  is feeling much better in terms of his mood. Patient reports that he has not been experiencing any feelings of self-harm or thoughts of suicide. Affect is brighter and more reactive. Denies any side effects to medication regimen. Explored his  concerns and feelings. Reassurance and support provided. Charting and medications reviewed. Spent time discharge planning. Plan will be for discharge tomorrow as long as patient feels better overnight and there are no safety concerns.
supportive therapy and hospital milieu. Side effects of medications reviewed and medication education provided.     ELOS:5-7 days

## 2019-05-02 NOTE — DISCHARGE SUMMARY
Beckie Clark [address],  [address] [place]  [address]    Phone:  [phone]   · gabapentin 800 MG tablet  · lisinopril 5 MG tablet  · metFORMIN 1000 MG tablet  · pantoprazole 40 MG tablet  · QUEtiapine 300 MG tablet  · sertraline 100 MG tablet  · traZODone 50 MG tablet         Disposition: Home    Discharge Date: 4/1/2019

## 2019-06-06 ENCOUNTER — TELEPHONE (OUTPATIENT)
Dept: GASTROENTEROLOGY | Age: 54
End: 2019-06-06

## 2019-06-13 NOTE — TELEPHONE ENCOUNTER
Shantelryan Tia states he had a colonoscopy 2 years ago at [facility] with multiple polyps. He will be due for next colonoscopy 3 years from date of that last one. He will find out date and return call to PCP.

## 2019-09-27 ENCOUNTER — APPOINTMENT (OUTPATIENT)
Dept: GENERAL RADIOLOGY | Age: 54
End: 2019-09-27
Payer: COMMERCIAL

## 2019-09-27 ENCOUNTER — HOSPITAL ENCOUNTER (EMERGENCY)
Age: 54
Discharge: HOME | End: 2019-09-27
Attending: EMERGENCY MEDICINE
Payer: COMMERCIAL

## 2019-09-27 VITALS
WEIGHT: 230 LBS | HEIGHT: 65 IN | SYSTOLIC BLOOD PRESSURE: 149 MMHG | DIASTOLIC BLOOD PRESSURE: 99 MMHG | TEMPERATURE: 97.9 F | BODY MASS INDEX: 38.32 KG/M2 | OXYGEN SATURATION: 99 % | HEART RATE: 89 BPM | RESPIRATION RATE: 18 BRPM

## 2019-09-27 DIAGNOSIS — L03.031 PARONYCHIA OF SECOND TOE OF RIGHT FOOT: ICD-10-CM

## 2019-09-27 DIAGNOSIS — L60.8 ONYCHOMADESIS OF TOENAIL: ICD-10-CM

## 2019-09-27 DIAGNOSIS — L03.031 CELLULITIS OF TOE OF RIGHT FOOT: Primary | ICD-10-CM

## 2019-09-27 PROCEDURE — 73630 X-RAY EXAM OF FOOT: CPT

## 2019-09-27 PROCEDURE — 6370000000 HC RX 637 (ALT 250 FOR IP): Performed by: EMERGENCY MEDICINE

## 2019-09-27 PROCEDURE — 99283 EMERGENCY DEPT VISIT LOW MDM: CPT

## 2019-09-27 PROCEDURE — 10060 I&D ABSCESS SIMPLE/SINGLE: CPT

## 2019-09-27 RX ORDER — IBUPROFEN 800 MG/1
800 TABLET ORAL EVERY 8 HOURS PRN
Qty: 30 TABLET | Refills: 0 | Status: ON HOLD | OUTPATIENT
Start: 2019-09-27 | End: 2020-01-06

## 2019-09-27 RX ORDER — DOXYCYCLINE 100 MG/1
100 TABLET ORAL 2 TIMES DAILY
Qty: 20 TABLET | Refills: 0 | Status: SHIPPED | OUTPATIENT
Start: 2019-09-27 | End: 2019-10-07

## 2019-09-27 RX ORDER — DOXYCYCLINE HYCLATE 100 MG
100 TABLET ORAL ONCE
Status: COMPLETED | OUTPATIENT
Start: 2019-09-27 | End: 2019-09-27

## 2019-09-27 RX ORDER — OXYCODONE HYDROCHLORIDE AND ACETAMINOPHEN 5; 325 MG/1; MG/1
1 TABLET ORAL EVERY 6 HOURS PRN
Qty: 8 TABLET | Refills: 0 | Status: SHIPPED | OUTPATIENT
Start: 2019-09-27 | End: 2019-09-30

## 2019-09-27 RX ORDER — OXYCODONE HYDROCHLORIDE AND ACETAMINOPHEN 5; 325 MG/1; MG/1
1 TABLET ORAL ONCE
Status: COMPLETED | OUTPATIENT
Start: 2019-09-27 | End: 2019-09-27

## 2019-09-27 RX ADMIN — OXYCODONE HYDROCHLORIDE AND ACETAMINOPHEN 1 TABLET: 5; 325 TABLET ORAL at 10:08

## 2019-09-27 RX ADMIN — OXYCODONE HYDROCHLORIDE AND ACETAMINOPHEN 1 TABLET: 5; 325 TABLET ORAL at 12:05

## 2019-09-27 RX ADMIN — DOXYCYCLINE HYCLATE 100 MG: 100 TABLET, COATED ORAL at 10:08

## 2019-09-27 ASSESSMENT — PAIN DESCRIPTION - PROGRESSION: CLINICAL_PROGRESSION: NOT CHANGED

## 2019-09-27 ASSESSMENT — PAIN SCALES - GENERAL
PAINLEVEL_OUTOF10: 10
PAINLEVEL_OUTOF10: 10
PAINLEVEL_OUTOF10: 7
PAINLEVEL_OUTOF10: 10

## 2019-09-27 ASSESSMENT — PAIN DESCRIPTION - PAIN TYPE
TYPE: ACUTE PAIN
TYPE: ACUTE PAIN

## 2019-09-27 ASSESSMENT — PAIN DESCRIPTION - LOCATION
LOCATION: TOE (COMMENT WHICH ONE)
LOCATION: TOE (COMMENT WHICH ONE)

## 2019-09-27 ASSESSMENT — PAIN DESCRIPTION - DESCRIPTORS: DESCRIPTORS: CONSTANT

## 2019-09-27 ASSESSMENT — PAIN DESCRIPTION - ORIENTATION
ORIENTATION: RIGHT
ORIENTATION: RIGHT

## 2019-09-27 ASSESSMENT — PAIN DESCRIPTION - FREQUENCY: FREQUENCY: CONTINUOUS

## 2019-10-02 ASSESSMENT — ENCOUNTER SYMPTOMS
SORE THROAT: 0
COUGH: 0
EYE PAIN: 0
DIARRHEA: 0
ABDOMINAL PAIN: 0
NAUSEA: 0
SHORTNESS OF BREATH: 0

## 2019-10-15 ENCOUNTER — HOSPITAL ENCOUNTER (OUTPATIENT)
Dept: SLEEP CENTER | Age: 54
Discharge: HOME | End: 2019-10-17
Payer: COMMERCIAL

## 2019-10-15 DIAGNOSIS — G47.33 OSA (OBSTRUCTIVE SLEEP APNEA): Primary | ICD-10-CM

## 2019-10-15 PROCEDURE — 95810 POLYSOM 6/> YRS 4/> PARAM: CPT

## 2019-10-16 VITALS
HEIGHT: 65 IN | OXYGEN SATURATION: 95 % | WEIGHT: 229 LBS | SYSTOLIC BLOOD PRESSURE: 118 MMHG | BODY MASS INDEX: 38.15 KG/M2 | RESPIRATION RATE: 17 BRPM | HEART RATE: 78 BPM | DIASTOLIC BLOOD PRESSURE: 70 MMHG

## 2019-10-16 ASSESSMENT — SLEEP AND FATIGUE QUESTIONNAIRES
HOW LIKELY ARE YOU TO NOD OFF OR FALL ASLEEP WHILE LYING DOWN TO REST IN THE AFTERNOON WHEN CIRCUMSTANCES PERMIT: 2
HOW LIKELY ARE YOU TO NOD OFF OR FALL ASLEEP WHILE SITTING QUIETLY AFTER LUNCH WITHOUT ALCOHOL: 0
HOW LIKELY ARE YOU TO NOD OFF OR FALL ASLEEP WHILE SITTING INACTIVE IN A PUBLIC PLACE: 0
HOW LIKELY ARE YOU TO NOD OFF OR FALL ASLEEP WHILE SITTING AND READING: 3
HOW LIKELY ARE YOU TO NOD OFF OR FALL ASLEEP WHILE WATCHING TV: 1
HOW LIKELY ARE YOU TO NOD OFF OR FALL ASLEEP IN A CAR, WHILE STOPPED FOR A FEW MINUTES IN TRAFFIC: 0
ESS TOTAL SCORE: 6
HOW LIKELY ARE YOU TO NOD OFF OR FALL ASLEEP WHILE SITTING AND TALKING TO SOMEONE: 0
HOW LIKELY ARE YOU TO NOD OFF OR FALL ASLEEP WHEN YOU ARE A PASSENGER IN A CAR FOR AN HOUR WITHOUT A BREAK: 0

## 2019-10-26 LAB — STATUS: NORMAL

## 2020-01-06 ENCOUNTER — HOSPITAL ENCOUNTER (INPATIENT)
Age: 55
LOS: 5 days | Discharge: HOME | DRG: 750 | End: 2020-01-11
Attending: EMERGENCY MEDICINE | Admitting: PSYCHIATRY & NEUROLOGY
Payer: COMMERCIAL

## 2020-01-06 PROBLEM — F25.9 SCHIZOAFFECTIVE DISORDER (HCC): Status: ACTIVE | Noted: 2020-01-06

## 2020-01-06 LAB
GLUCOSE BLD-MCNC: 145 MG/DL (ref 75–110)
GLUCOSE BLD-MCNC: 232 MG/DL (ref 75–110)
GLUCOSE BLD-MCNC: 323 MG/DL (ref 75–110)
GLUCOSE BLD-MCNC: 353 MG/DL (ref 75–110)

## 2020-01-06 PROCEDURE — 6370000000 HC RX 637 (ALT 250 FOR IP): Performed by: INTERNAL MEDICINE

## 2020-01-06 PROCEDURE — 82947 ASSAY GLUCOSE BLOOD QUANT: CPT

## 2020-01-06 PROCEDURE — 6370000000 HC RX 637 (ALT 250 FOR IP): Performed by: PSYCHIATRY & NEUROLOGY

## 2020-01-06 PROCEDURE — 1240000000 HC EMOTIONAL WELLNESS R&B

## 2020-01-06 PROCEDURE — 6370000000 HC RX 637 (ALT 250 FOR IP): Performed by: EMERGENCY MEDICINE

## 2020-01-06 PROCEDURE — 99285 EMERGENCY DEPT VISIT HI MDM: CPT

## 2020-01-06 RX ORDER — QUETIAPINE FUMARATE 300 MG/1
600 TABLET, FILM COATED ORAL NIGHTLY
Status: DISCONTINUED | OUTPATIENT
Start: 2020-01-06 | End: 2020-01-11 | Stop reason: HOSPADM

## 2020-01-06 RX ORDER — METFORMIN HYDROCHLORIDE 500 MG/1
1000 TABLET, EXTENDED RELEASE ORAL ONCE
Status: COMPLETED | OUTPATIENT
Start: 2020-01-06 | End: 2020-01-06

## 2020-01-06 RX ORDER — OXYCODONE HYDROCHLORIDE AND ACETAMINOPHEN 5; 325 MG/1; MG/1
1 TABLET ORAL EVERY 6 HOURS PRN
Status: DISCONTINUED | OUTPATIENT
Start: 2020-01-06 | End: 2020-01-11 | Stop reason: HOSPADM

## 2020-01-06 RX ORDER — PANTOPRAZOLE SODIUM 40 MG/1
40 TABLET, DELAYED RELEASE ORAL
Status: DISCONTINUED | OUTPATIENT
Start: 2020-01-07 | End: 2020-01-11 | Stop reason: HOSPADM

## 2020-01-06 RX ORDER — TRAZODONE HYDROCHLORIDE 50 MG/1
50 TABLET ORAL NIGHTLY
Status: DISCONTINUED | OUTPATIENT
Start: 2020-01-06 | End: 2020-01-11 | Stop reason: HOSPADM

## 2020-01-06 RX ORDER — HYDROXYZINE HYDROCHLORIDE 25 MG/1
25 TABLET, FILM COATED ORAL 2 TIMES DAILY PRN
Status: ON HOLD | COMMUNITY
End: 2020-08-15

## 2020-01-06 RX ORDER — OXYCODONE HYDROCHLORIDE 5 MG/1
5 TABLET ORAL EVERY 6 HOURS PRN
Status: DISCONTINUED | OUTPATIENT
Start: 2020-01-06 | End: 2020-01-11 | Stop reason: HOSPADM

## 2020-01-06 RX ORDER — OXYCODONE AND ACETAMINOPHEN 10; 325 MG/1; MG/1
1 TABLET ORAL EVERY 6 HOURS PRN
Status: DISCONTINUED | OUTPATIENT
Start: 2020-01-06 | End: 2020-01-06 | Stop reason: CLARIF

## 2020-01-06 RX ORDER — GABAPENTIN 400 MG/1
800 CAPSULE ORAL 3 TIMES DAILY
Status: DISCONTINUED | OUTPATIENT
Start: 2020-01-06 | End: 2020-01-11 | Stop reason: HOSPADM

## 2020-01-06 RX ORDER — SERTRALINE HYDROCHLORIDE 100 MG/1
100 TABLET, FILM COATED ORAL DAILY
Status: DISCONTINUED | OUTPATIENT
Start: 2020-01-06 | End: 2020-01-11 | Stop reason: HOSPADM

## 2020-01-06 RX ORDER — HYDROXYZINE HYDROCHLORIDE 25 MG/1
25 TABLET, FILM COATED ORAL 2 TIMES DAILY PRN
Status: DISCONTINUED | OUTPATIENT
Start: 2020-01-06 | End: 2020-01-11 | Stop reason: HOSPADM

## 2020-01-06 RX ORDER — ACETAMINOPHEN 500 MG
1000 TABLET ORAL ONCE
Status: COMPLETED | OUTPATIENT
Start: 2020-01-06 | End: 2020-01-06

## 2020-01-06 RX ADMIN — METFORMIN HYDROCHLORIDE 1000 MG: 500 TABLET, EXTENDED RELEASE ORAL at 10:18

## 2020-01-06 RX ADMIN — TRAZODONE HYDROCHLORIDE 50 MG: 50 TABLET ORAL at 21:22

## 2020-01-06 RX ADMIN — OXYCODONE HYDROCHLORIDE 5 MG: 5 TABLET ORAL at 17:56

## 2020-01-06 RX ADMIN — GABAPENTIN 800 MG: 400 CAPSULE ORAL at 21:22

## 2020-01-06 RX ADMIN — OXYCODONE AND ACETAMINOPHEN 1 TABLET: 5; 325 TABLET ORAL at 17:56

## 2020-01-06 RX ADMIN — METFORMIN HYDROCHLORIDE 1000 MG: 500 TABLET ORAL at 21:22

## 2020-01-06 RX ADMIN — HYDROXYZINE HYDROCHLORIDE 25 MG: 25 TABLET, FILM COATED ORAL at 17:56

## 2020-01-06 RX ADMIN — QUETIAPINE FUMARATE 600 MG: 300 TABLET ORAL at 21:22

## 2020-01-06 RX ADMIN — ACETAMINOPHEN 1000 MG: 500 TABLET, FILM COATED ORAL at 10:18

## 2020-01-06 RX ADMIN — INSULIN LISPRO 10 UNITS: 100 INJECTION, SOLUTION INTRAVENOUS; SUBCUTANEOUS at 17:39

## 2020-01-06 RX ADMIN — INSULIN LISPRO 1 UNITS: 100 INJECTION, SOLUTION INTRAVENOUS; SUBCUTANEOUS at 21:23

## 2020-01-06 RX ADMIN — SERTRALINE HYDROCHLORIDE 100 MG: 100 TABLET ORAL at 17:38

## 2020-01-06 ASSESSMENT — SLEEP AND FATIGUE QUESTIONNAIRES
RESTFUL SLEEP: YES
DO YOU USE A SLEEP AID: NO
DIFFICULTY STAYING ASLEEP: YES
DIFFICULTY FALLING ASLEEP: YES
SLEEP PATTERN: DIFFICULTY FALLING ASLEEP
DIFFICULTY ARISING: NO
DO YOU HAVE DIFFICULTY SLEEPING: YES
AVERAGE NUMBER OF SLEEP HOURS: 3

## 2020-01-06 ASSESSMENT — PATIENT HEALTH QUESTIONNAIRE - PHQ9: SUM OF ALL RESPONSES TO PHQ QUESTIONS 1-9: 17

## 2020-01-06 ASSESSMENT — PAIN DESCRIPTION - LOCATION
LOCATION: BACK
LOCATION: BACK

## 2020-01-06 ASSESSMENT — PAIN SCALES - GENERAL
PAINLEVEL_OUTOF10: 0
PAINLEVEL_OUTOF10: 9
PAINLEVEL_OUTOF10: 8
PAINLEVEL_OUTOF10: 0
PAINLEVEL_OUTOF10: 9

## 2020-01-06 ASSESSMENT — LIFESTYLE VARIABLES: HISTORY_ALCOHOL_USE: NO

## 2020-01-06 ASSESSMENT — PAIN DESCRIPTION - PAIN TYPE
TYPE: CHRONIC PAIN
TYPE: CHRONIC PAIN

## 2020-01-06 NOTE — GROUP NOTE
Wellness Group Note  Group Topic: Safety    Date: January 6, 2020  Group Start Time: [facility]  Group End Time: [address]  Attendees: 20/22    Patient's Goal: Increased understanding/knowledge of personal safety and safety on the unit. Notes:  Pt listened to safety presentation and cooperative with room/contraband check. Status After Intervention:  Unchanged    Participation Level:  Active Listener and Minimal    Participation Quality: Appropriate    Speech:  normal    Thought Process/Content: Logical    Affective Functioning: Congruent    Mood: WDL    Level of consciousness:  Alert and Oriented x4    Response to Learning: Able to verbalize current knowledge/experience, Able to verbalize/acknowledge new learning, Able to retain information and Progressing to goal    Endings: None Reported    Modes of Intervention: Education, Problem-solving and Limit-setting    Discipline Responsible: Behavioral Health Tech    Signature:  Brinda Benavides

## 2020-01-06 NOTE — BH NOTE
Patient given tobacco quitline number 90853076872 at this time, refusing to call at this time, states \" I just dont want to quit now\"- patient given information as to the dangers of long term tobacco use. Continue to reinforce the importance of tobacco cessation.

## 2020-01-06 NOTE — BH NOTE
Medication has been verified with [address] [place] [address] - [facility], writer spoke with [employer].  Medication updated in home medication list.

## 2020-01-06 NOTE — H&P
HISTORY and [facility]el[address] [address]       NAME:  Mao Lake  MRN: [id number]   YOB: 1965   Date: 1/6/2020   Age: 47 y.o. Gender: male     COMPLAINT AND PRESENT HISTORY:      Mao Lake is 47 y.o.,  male, admitted because of depression and suicidal ideation. Pt presented to [facility] ED with suicidal ideation with no attempt to self harm. Pt is having auditory hallucinations of command voices telling him to stab himself. Pt denies current suicidal ideation. Pt denies any homicidal ideation. Pt has a history of previous suicidal ideation but no attempts stating he recognizes when he becomes suicidal and \"get help before I attempt. \"   Pt feels hopeless, helpless and  loss of energy. Pt has poor sleep most nights only sleeping 3 hours. Has trouble falling asleep and staying asleep. He reports a loss of appetite stating \" I haven't eaten in two days. \" No current visual or tactile hallucinations. Patients current stressors include recently discontinuing life support measures for his father and noncompliance with prescribed medications. Patient denies any current alcohol. Admits to using cocaine and marijuana in past but not currently. Pt smokes cigarettes 0.5 pack/day. Patient lives alone. Pt has been non compliant with the psychiatric medications for 1 week. Pt c/o chronic back pain which he reports is not different today. Rating pain 8/10. Reports he has taken percocet in the past with good relief, otherwise, nothing makes pain better or worse. Blood sugar on fingerstick 323 this am and 232 this afternoon. Pt reports taking metformin outpatient. Will consult internal medicine for glucose management. Denies any other somatic complaints.       DIAGNOSTIC RESULTS       PAST MEDICAL HISTORY     Past Medical History:   Diagnosis Date    Acute depression 9/8/2018    Back pain     DM2 (diabetes mellitus, type 2) ([facility] [address] 4/21/2014    Generalized OA     medications on file prior to encounter. Current Outpatient Medications on File Prior to Encounter   Medication Sig Dispense Refill    ibuprofen (ADVIL;MOTRIN) 800 MG tablet Take 1 tablet by mouth every 8 hours as needed for Pain 30 tablet 0    gabapentin (NEURONTIN) 800 MG tablet Take 1 tablet by mouth 3 times daily for 30 days. 90 tablet 0    sertraline (ZOLOFT) 100 MG tablet Take 1 tablet by mouth daily 30 tablet 0    traZODone (DESYREL) 50 MG tablet Take 1 tablet by mouth nightly 30 tablet 0    metFORMIN (GLUCOPHAGE) 1000 MG tablet Take 1 tablet by mouth 2 times daily 60 tablet 0    lisinopril (PRINIVIL;ZESTRIL) 5 MG tablet Take 1 tablet by mouth daily 30 tablet 0    QUEtiapine (SEROQUEL) 300 MG tablet Take 2 tablets by mouth nightly 60 tablet 0    pantoprazole (PROTONIX) 40 MG tablet Take 1 tablet by mouth every morning (before breakfast) 30 tablet 0    oxyCODONE-acetaminophen (PERCOCET)  MG per tablet Take 1 tablet by mouth every 6 hours as needed for Pain. Maximum of 4 tabs a day- prescribed by pain management NP                      General health:  Fairly good. No fever or chills. Skin:  No itching, redness or rash. Head, eyes, ears, nose, throat:  No headache, epistaxis, rhinorrhea hearing loss or sore throat. Neck:  No pain, stiffness or masses. Cardiovascular/Respiratory system:  No chest pain, palpitation, shortness of breath, coughing or expectoration. Gastrointestinal tract: No abdominal pain, nausea, vomiting, dysphagia, diarrhea or constipation. Genitourinary:  No burning on micturition. No hesitancy, urgency, frequency or discoloration of urine. Locomotor:   C/O back pain. No joint pains. No swelling or deformities. Neuropsychiatric:  See HPI.      GENERAL PHYSICAL EXAM:     Vitals: BP (!) 150/83   Pulse 93   Temp 97.7 °F (36.5 °C) (Oral)   Resp 16   Ht 5' 5\" (1.651 m)   Wt 229 lb (103.9 kg)   SpO2 100%   BMI 38.11

## 2020-01-06 NOTE — H&P
PSYCHIATRIC EVALUATION    No contraindications for seclusion  No contraindications for restraint      CHIEF COMPLAINT:    Schizoaffective disorder (HCC)  Karthik Liao is a 47 y.o. male who presents with Schizoaffective disorder [facility]  The patient has been transferred from the emergency department where he presented with a chief complaint of suicidal ideation. It was reported that the patient stopped his medication and started hearing voices telling him to kill himself. The patient was medically cleared and transferred to the psychiatric unit for further evaluation and management. HISTORY OF PRESENT ILLNESS:     The patient presented as a 51-year-old -American man who is single, father of a 80-year-old daughter. He is on disability depending on SSI assistance and lives by himself. The patient reported that he stopped his medication over the last 3 weeks since his father passed away due to a stroke. He reported that voices getting worse and telling him to kill himself by stabbing himself. The patient reported history of schizophrenia and schizoaffective disorder bipolar type. He was prescribed Seroquel, Zoloft and the trazodone. He is a client of [facility]. He reported feeling depressed and complaining of back pain. He reported feeling hopeless, helpless and worthless. He reported life is not worth living. The patient reported problem falling and staying asleep. The patient denied previous suicidal attempts but reported repeated suicidal ideations. The patient had numerous admissions to the hospital for symptoms of depression and psychosis. The patient denied using street drugs or drinking alcohol. The patient reported feeling safe at the hospital.  The safety plan has been discussed with the patient and he agreed to resume his medication.     PAST PSYCHIATRIC HISTORY:     History of schizophrenia and schizoaffective disorder bipolar type  The patient is a organizations: Not on file     Relationship status: Not on file    Intimate partner violence:     Fear of current or ex partner: Not on file     Emotionally abused: Not on file     Physically abused: Not on file     Forced sexual activity: Not on file   Other Topics Concern    Not on file   Social History Narrative    Not on file       BP (!) 150/83   Pulse 93   Temp 97.7 °F (36.5 °C) (Oral)   Resp 16   Ht 5' 5\" (1.651 m)   Wt 229 lb (103.9 kg)   SpO2 100%   BMI 38.11 kg/m²   MENTAL STATUS EXAMINATION:     MOOD-is depressed and dysphoric   Affect-is depressed  Patient feels helpless hopeless and worthless  Psychomotor activity : decreased. APPEARANCE:  Personal hygiene is poor and patient is neglecting his appearance. Patient is somewhat unkempt  PSYCHOSIS: He reported auditory but denied visual hallucinations. Denies paranoid delusions. ORIENTATION:  Oriented to time place and person. Recent and remote memory is grossly intact. Intelligence appears to be average  CONCENTRATION:  poor. SPEECH : goal directed , but slow . Length of stay : 5-7 days      Strengths:  Patient signed voluntarily and agreed to take medications  The patient is already connected with [facility] behavioral health care    Weaknesses:  Patient has recent loss  Patient has history of noncompliant with his medication    Diagnosis: Principal Problem:    Schizoaffective disorder ([facility] [address]  Resolved Problems:    * No resolved hospital problems.  *         Treatment plan:           Chart was reviewed the patient has been interviewed  Continue same medications as prescribed above  Patient was discussed with the  and the treatment team.   Provided Supportive and insight-oriented psychotherapy  Provided empathic listening, validation and support  Provided support & education of purpose, risks vs. benefits and side effects of treatment with psychotropics  Provided support and education about risk of not receiving

## 2020-01-06 NOTE — BH NOTE
Blood sugar at dinner 353. Dr Barron Oakley (Internal Med Consult) was notified. Per Dr Barron Oakley, \" Start insulin medium sliding scale .  Will watch BP and decide tomorrow\" Will do as ordered

## 2020-01-06 NOTE — ED NOTES
[facility] 323-advised Dr Myles Bravo, RN  01/06/20 3451
Report given to Yeimy Webb RN from ED. Report method by phone   The following was reviewed with receiving RN:   Current vital signs:  /71   Pulse 93   Temp 98.2 °F (36.8 °C) (Oral)   Resp 16   Ht 5' 5\" (1.651 m)   Wt 230 lb (104.3 kg)   SpO2 99%   BMI 38.27 kg/m²                MEWS Score: 1     Any medication or safety alerts were reviewed. Any pending diagnostics and notifications were also reviewed, as well as any safety concerns or issues, abnormal labs, abnormal imaging, and abnormal assessment findings. Questions were answered.           Toan Ramos RN  01/06/20 9784
in depressive symptoms and states he has been isolating himself. Patient denies homicidal ideations. Level of Care Disposition:    Writer consulted with Dr. Sulaiman Hitchcock who recommends inpatient psychiatric admission for safety and stabilization. Patient is in agreement.        Insurance Precertification Authorization:

## 2020-01-06 NOTE — ED PROVIDER NOTES
[address] ED  eMERGENCY dEPARTMENT eNCOUnter      Pt Name: Rc Baez  MRN: [id number]  YOB: 1965  Date of evaluation: 1/6/20  PCP: Jamie Brunson MD    CHIEF COMPLAINT:   Chief Complaint   Patient presents with    Suicidal     HISTORY OF PRESENT ILLNESS    Rc Baez is a 47 y.o. male who presents with a chief complaint of suicidal ideations. Patient states he stopped taking his medications about 1 week ago after he had to take his father off life support. He states he has started hearing voices telling him to kill himself. He states the voices are telling him to stab himself. Usually when he is on his medications these symptoms are very minimal.  Has not attempted any self-harm. No homicidal ideations. No visual hallucinations. No alcohol or drug use. His only other complaint is chronic back pain that is no different today. Symptoms are acute. Symptoms are moderate. Nothing makes symptoms better or worse. Patient has no other complaints at this time. REVIEW OF SYSTEMS       Constitutional: Denies recent fever, chills. Neck: No neck pain. Respiratory: Denies recent shortness of breath. Cardiac:  Denies recent chest pain. GI: Denies any recent abdominal pain nausea or vomiting. : Denies dysuria. Musculoskeletal: Denies focal weakness. Positive for back pain  Neurologic: Denies headache or focal weakness. Skin:  Denies any rash. Psychiatric: Positive for suicidal ideations and auditory hallucinations    Negative in 10 essential Systems except as mentioned above and in the HPI. PAST MEDICAL HISTORY   PMH:  has a past medical history of Acute depression, Back pain, DM2 (diabetes mellitus, type 2) ([address] Generalized OA, GERD (gastroesophageal reflux disease), HTN (hypertension), Schizoaffective disorder, bipolar type ([address] and Unspecified sleep apnea.   Surgical History:  has a past surgical history that includes Tonsillectomy and they were used in the gender appropriate to the circumstances; and whenever words are used in this note in the singular or plural form, they shall be construed as though they were used in the form appropriate to the circumstances.)    Paula Hilton DO  Attending Emergency Medicine Physician        Paula Hilton DO  01/06/20 7278

## 2020-01-07 LAB
ANION GAP SERPL CALCULATED.3IONS-SCNC: 11 MMOL/L (ref 9–17)
BUN BLDV-MCNC: 12 MG/DL (ref 6–20)
BUN/CREAT BLD: ABNORMAL (ref 9–20)
CALCIUM SERPL-MCNC: 8.8 MG/DL (ref 8.6–10.4)
CHLORIDE BLD-SCNC: 99 MMOL/L (ref 98–107)
CO2: 23 MMOL/L (ref 20–31)
CREAT SERPL-MCNC: 0.86 MG/DL (ref 0.7–1.2)
ESTIMATED AVERAGE GLUCOSE: 295 MG/DL
GFR AFRICAN AMERICAN: >60 ML/MIN
GFR NON-AFRICAN AMERICAN: >60 ML/MIN
GFR SERPL CREATININE-BSD FRML MDRD: ABNORMAL ML/MIN/{1.73_M2}
GFR SERPL CREATININE-BSD FRML MDRD: ABNORMAL ML/MIN/{1.73_M2}
GLUCOSE BLD-MCNC: 196 MG/DL (ref 75–110)
GLUCOSE BLD-MCNC: 245 MG/DL (ref 75–110)
GLUCOSE BLD-MCNC: 274 MG/DL (ref 75–110)
GLUCOSE BLD-MCNC: 294 MG/DL (ref 70–99)
GLUCOSE BLD-MCNC: 301 MG/DL (ref 75–110)
HBA1C MFR BLD: 11.9 % (ref 4–6)
POTASSIUM SERPL-SCNC: 4.6 MMOL/L (ref 3.7–5.3)
SODIUM BLD-SCNC: 133 MMOL/L (ref 135–144)

## 2020-01-07 PROCEDURE — 99222 1ST HOSP IP/OBS MODERATE 55: CPT | Performed by: INTERNAL MEDICINE

## 2020-01-07 PROCEDURE — 36415 COLL VENOUS BLD VENIPUNCTURE: CPT

## 2020-01-07 PROCEDURE — 6370000000 HC RX 637 (ALT 250 FOR IP): Performed by: INTERNAL MEDICINE

## 2020-01-07 PROCEDURE — 6370000000 HC RX 637 (ALT 250 FOR IP): Performed by: PSYCHIATRY & NEUROLOGY

## 2020-01-07 PROCEDURE — 82947 ASSAY GLUCOSE BLOOD QUANT: CPT

## 2020-01-07 PROCEDURE — 80048 BASIC METABOLIC PNL TOTAL CA: CPT

## 2020-01-07 PROCEDURE — 83036 HEMOGLOBIN GLYCOSYLATED A1C: CPT

## 2020-01-07 PROCEDURE — 1240000000 HC EMOTIONAL WELLNESS R&B

## 2020-01-07 RX ORDER — DEXTROSE MONOHYDRATE 25 G/50ML
12.5 INJECTION, SOLUTION INTRAVENOUS PRN
Status: DISCONTINUED | OUTPATIENT
Start: 2020-01-07 | End: 2020-01-11 | Stop reason: HOSPADM

## 2020-01-07 RX ORDER — NICOTINE POLACRILEX 4 MG
15 LOZENGE BUCCAL PRN
Status: DISCONTINUED | OUTPATIENT
Start: 2020-01-07 | End: 2020-01-11 | Stop reason: HOSPADM

## 2020-01-07 RX ORDER — DEXTROSE MONOHYDRATE 50 MG/ML
100 INJECTION, SOLUTION INTRAVENOUS PRN
Status: DISCONTINUED | OUTPATIENT
Start: 2020-01-07 | End: 2020-01-11 | Stop reason: HOSPADM

## 2020-01-07 RX ADMIN — OXYCODONE AND ACETAMINOPHEN 1 TABLET: 5; 325 TABLET ORAL at 07:43

## 2020-01-07 RX ADMIN — OXYCODONE AND ACETAMINOPHEN 1 TABLET: 5; 325 TABLET ORAL at 13:58

## 2020-01-07 RX ADMIN — PANTOPRAZOLE SODIUM 40 MG: 40 TABLET, DELAYED RELEASE ORAL at 08:09

## 2020-01-07 RX ADMIN — LINAGLIPTIN 5 MG: 5 TABLET, FILM COATED ORAL at 08:09

## 2020-01-07 RX ADMIN — INSULIN LISPRO 2 UNITS: 100 INJECTION, SOLUTION INTRAVENOUS; SUBCUTANEOUS at 12:31

## 2020-01-07 RX ADMIN — OXYCODONE AND ACETAMINOPHEN 1 TABLET: 5; 325 TABLET ORAL at 20:48

## 2020-01-07 RX ADMIN — INSULIN LISPRO 4 UNITS: 100 INJECTION, SOLUTION INTRAVENOUS; SUBCUTANEOUS at 17:24

## 2020-01-07 RX ADMIN — TRAZODONE HYDROCHLORIDE 50 MG: 50 TABLET ORAL at 22:06

## 2020-01-07 RX ADMIN — INSULIN LISPRO 4 UNITS: 100 INJECTION, SOLUTION INTRAVENOUS; SUBCUTANEOUS at 20:48

## 2020-01-07 RX ADMIN — OXYCODONE HYDROCHLORIDE 5 MG: 5 TABLET ORAL at 07:44

## 2020-01-07 RX ADMIN — OXYCODONE HYDROCHLORIDE 5 MG: 5 TABLET ORAL at 20:48

## 2020-01-07 RX ADMIN — OXYCODONE HYDROCHLORIDE 5 MG: 5 TABLET ORAL at 13:59

## 2020-01-07 RX ADMIN — GABAPENTIN 800 MG: 400 CAPSULE ORAL at 08:09

## 2020-01-07 RX ADMIN — SERTRALINE HYDROCHLORIDE 100 MG: 100 TABLET ORAL at 08:09

## 2020-01-07 RX ADMIN — METFORMIN HYDROCHLORIDE 1000 MG: 500 TABLET ORAL at 22:06

## 2020-01-07 RX ADMIN — QUETIAPINE FUMARATE 600 MG: 300 TABLET ORAL at 22:06

## 2020-01-07 RX ADMIN — METFORMIN HYDROCHLORIDE 1000 MG: 500 TABLET ORAL at 08:09

## 2020-01-07 RX ADMIN — GABAPENTIN 800 MG: 400 CAPSULE ORAL at 22:06

## 2020-01-07 RX ADMIN — INSULIN LISPRO 6 UNITS: 100 INJECTION, SOLUTION INTRAVENOUS; SUBCUTANEOUS at 08:11

## 2020-01-07 ASSESSMENT — PAIN SCALES - GENERAL
PAINLEVEL_OUTOF10: 6
PAINLEVEL_OUTOF10: 3
PAINLEVEL_OUTOF10: 6
PAINLEVEL_OUTOF10: 7
PAINLEVEL_OUTOF10: 3

## 2020-01-07 ASSESSMENT — PAIN DESCRIPTION - LOCATION: LOCATION: BACK

## 2020-01-07 ASSESSMENT — LIFESTYLE VARIABLES: HISTORY_ALCOHOL_USE: NO

## 2020-01-07 ASSESSMENT — PAIN DESCRIPTION - PAIN TYPE: TYPE: CHRONIC PAIN

## 2020-01-07 NOTE — PLAN OF CARE
)  Delusions: No  Memory:Normal: Yes  Insight and Judgment: No  Insight and Judgment: Poor Judgment, Poor Insight  Present Suicidal Ideation: Yes  Present Homicidal Ideation: No    Daily Assessment Last Entry:   Daily Sleep (WDL): Within Defined Limits         Patient Currently in Pain: No  Daily Nutrition (WDL): Within Defined Limits    Patient Monitoring:  Frequency of Checks: 4 times per hour, close    Psychiatric Symptoms:   Depression Symptoms  Depression Symptoms: Isolative, Feelings of helplessness, Feelings of hopelessess  Anxiety Symptoms  Anxiety Symptoms: Generalized  Zuleima Symptoms  Zuleima Symptoms: No problems reported or observed. Psychosis Symptoms  Delusion Type: No problems reported or observed. Suicide Risk CSSR-S:  1) Within the past month, have you wished you were dead or wished you could go to sleep and not wake up? : Yes  2) Have you actually had any thoughts of killing yourself? : Yes  3) Have you been thinking about how you might kill yourself? : Yes  5) Have you started to work out or worked out the details of how to kill yourself?  Do you intend to carry out this plan? : Yes  6) Have you ever done anything, started to do anything, or prepared to do anything to end your life?: No  Change in Result:  no Change in Plan of care:  no      EDUCATION:   Learner Progress Toward Treatment Goals:   Reviewed results and recommendations of this team, Reviewed group plan and strategies, Reviewed signs, symptoms and risk of self harm and violent behavior, Reviewed goals and plan of care    Method:  small group, individual verbal education    Outcome:   Verbalized by patient but needs reinforcement to obtain goals    PATIENT GOALS:  Short term:  Stabilize on medicaitons   Long term:  Stay on medications     PLAN/TREATMENT RECOMMENDATIONS UPDATE:  continue with group therapies, increased socialization, continue planning for after discharge goals, continue with medication compliance    SHORT-TERM GOALS UPDATE:   Time frame for Short-Term Goals:  5-7 days    LONG-TERM GOALS UPDATE:   Time frame for Long-Term Goals:  6 months    Members Present in Team Meeting:     Radu Merritt

## 2020-01-07 NOTE — PROGRESS NOTES
Glucose Fingerstick    Collection Time: 01/06/20  4:59 PM   Result Value Ref Range    POC Glucose 353 (H) 75 - 110 mg/dL   POC Glucose Fingerstick    Collection Time: 01/06/20  8:31 PM   Result Value Ref Range    POC Glucose 145 (H) 75 - 110 mg/dL   POC Glucose Fingerstick    Collection Time: 01/07/20  7:39 AM   Result Value Ref Range    POC Glucose 274 (H) 75 - 110 mg/dL   Basic Metabolic Panel    Collection Time: 01/07/20  7:57 AM   Result Value Ref Range    Glucose 294 (H) 70 - 99 mg/dL    BUN 12 6 - 20 mg/dL    CREATININE 0.86 0.70 - 1.20 mg/dL    Bun/Cre Ratio NOT REPORTED 9 - 20    Calcium 8.8 8.6 - 10.4 mg/dL    Sodium 133 (L) 135 - 144 mmol/L    Potassium 4.6 3.7 - 5.3 mmol/L    Chloride 99 98 - 107 mmol/L    CO2 23 20 - 31 mmol/L    Anion Gap 11 9 - 17 mmol/L    GFR Non-African American >60 >60 mL/min    GFR African American >60 >60 mL/min    GFR Comment          GFR Staging NOT REPORTED            TREATMENT PLAN:     linagliptin  5 mg Oral Daily    gabapentin  800 mg Oral TID    metFORMIN  1,000 mg Oral BID    pantoprazole  40 mg Oral QAM AC    QUEtiapine  600 mg Oral Nightly    sertraline  100 mg Oral Daily    traZODone  50 mg Oral Nightly    insulin lispro  0-12 Units Subcutaneous TID WC    insulin lispro  0-6 Units Subcutaneous Nightly     glucose, dextrose, glucagon (rDNA), dextrose, hydrOXYzine, oxyCODONE-acetaminophen **AND** oxyCODONE, nicotine polacrilex    Chart was reviewed patient has been interviewed  Tinea same medications as prescribed above  Patient was discussed with the  and the treatment team.   Provided Supportive and insight-oriented psychotherapy psychotherapy  Recommended involvement in Unit milieu  Provided empathic listening, validation and support  Patient acknowledged and mutually decided to continue with current treatment plan  Discharge planning was discussed with the patient.      Elisa Graves MD        This note was created with the assistance of a

## 2020-01-07 NOTE — GROUP NOTE
Group Therapy Note    Date: 1/7/2020    Group Start Time: 0900  Group End Time: 0930  Group Topic: Community Meeting    [facility]    Patient refused to attend community meeting and goal setting group at 0900 after encouragement from staff. 1:1 talk time offered by staff as alternative to group session.

## 2020-01-07 NOTE — BH NOTE
Transfer Note    Pt was transferred to Unit A with all belongings and chart. Pt handoff given to GURVINDER Solorzano at 1020.

## 2020-01-07 NOTE — GROUP NOTE
Group Therapy Note    Date: 1/7/2020    Group Start Time: 1110  Group End Time: 1130  Group Topic: Recreational    Arturo, LEXIE    Patient did not attend Recreation Therapy Group after encouragement from staff. 1:1 talk time offered but patient refused.      Signature:  Mamta Ellison

## 2020-01-07 NOTE — CARE COORDINATION
treatment. Pt denied any other AOD use. Pt denied any current legal issues. Stated in the 80's and 90's he was arrested for theft. Pt denied abuse hx.

## 2020-01-08 LAB
GLUCOSE BLD-MCNC: 242 MG/DL (ref 75–110)
GLUCOSE BLD-MCNC: 242 MG/DL (ref 75–110)
GLUCOSE BLD-MCNC: 276 MG/DL (ref 75–110)
GLUCOSE BLD-MCNC: 282 MG/DL (ref 75–110)

## 2020-01-08 PROCEDURE — 6370000000 HC RX 637 (ALT 250 FOR IP): Performed by: INTERNAL MEDICINE

## 2020-01-08 PROCEDURE — 6370000000 HC RX 637 (ALT 250 FOR IP): Performed by: PSYCHIATRY & NEUROLOGY

## 2020-01-08 PROCEDURE — 82947 ASSAY GLUCOSE BLOOD QUANT: CPT

## 2020-01-08 PROCEDURE — 99232 SBSQ HOSP IP/OBS MODERATE 35: CPT | Performed by: INTERNAL MEDICINE

## 2020-01-08 PROCEDURE — 1240000000 HC EMOTIONAL WELLNESS R&B

## 2020-01-08 RX ORDER — GLIMEPIRIDE 2 MG/1
2 TABLET ORAL
Status: DISCONTINUED | OUTPATIENT
Start: 2020-01-09 | End: 2020-01-09

## 2020-01-08 RX ADMIN — METFORMIN HYDROCHLORIDE 1000 MG: 500 TABLET ORAL at 21:10

## 2020-01-08 RX ADMIN — OXYCODONE AND ACETAMINOPHEN 1 TABLET: 5; 325 TABLET ORAL at 15:15

## 2020-01-08 RX ADMIN — LINAGLIPTIN 5 MG: 5 TABLET, FILM COATED ORAL at 07:43

## 2020-01-08 RX ADMIN — GABAPENTIN 800 MG: 400 CAPSULE ORAL at 13:38

## 2020-01-08 RX ADMIN — PANTOPRAZOLE SODIUM 40 MG: 40 TABLET, DELAYED RELEASE ORAL at 07:43

## 2020-01-08 RX ADMIN — OXYCODONE AND ACETAMINOPHEN 1 TABLET: 5; 325 TABLET ORAL at 07:43

## 2020-01-08 RX ADMIN — TRAZODONE HYDROCHLORIDE 50 MG: 50 TABLET ORAL at 21:11

## 2020-01-08 RX ADMIN — OXYCODONE HYDROCHLORIDE 5 MG: 5 TABLET ORAL at 07:43

## 2020-01-08 RX ADMIN — INSULIN LISPRO 4 UNITS: 100 INJECTION, SOLUTION INTRAVENOUS; SUBCUTANEOUS at 12:05

## 2020-01-08 RX ADMIN — OXYCODONE HYDROCHLORIDE 5 MG: 5 TABLET ORAL at 15:15

## 2020-01-08 RX ADMIN — INSULIN LISPRO 6 UNITS: 100 INJECTION, SOLUTION INTRAVENOUS; SUBCUTANEOUS at 17:47

## 2020-01-08 RX ADMIN — INSULIN LISPRO 2 UNITS: 100 INJECTION, SOLUTION INTRAVENOUS; SUBCUTANEOUS at 21:10

## 2020-01-08 RX ADMIN — INSULIN LISPRO 6 UNITS: 100 INJECTION, SOLUTION INTRAVENOUS; SUBCUTANEOUS at 07:42

## 2020-01-08 RX ADMIN — OXYCODONE HYDROCHLORIDE 5 MG: 5 TABLET ORAL at 21:27

## 2020-01-08 RX ADMIN — GABAPENTIN 800 MG: 400 CAPSULE ORAL at 07:42

## 2020-01-08 RX ADMIN — METFORMIN HYDROCHLORIDE 1000 MG: 500 TABLET ORAL at 07:42

## 2020-01-08 RX ADMIN — QUETIAPINE FUMARATE 600 MG: 300 TABLET ORAL at 21:11

## 2020-01-08 RX ADMIN — SERTRALINE HYDROCHLORIDE 100 MG: 100 TABLET ORAL at 07:43

## 2020-01-08 RX ADMIN — OXYCODONE AND ACETAMINOPHEN 1 TABLET: 5; 325 TABLET ORAL at 21:27

## 2020-01-08 RX ADMIN — GABAPENTIN 800 MG: 400 CAPSULE ORAL at 21:10

## 2020-01-08 ASSESSMENT — PAIN DESCRIPTION - LOCATION
LOCATION: BACK

## 2020-01-08 ASSESSMENT — PAIN SCALES - GENERAL
PAINLEVEL_OUTOF10: 7
PAINLEVEL_OUTOF10: 7
PAINLEVEL_OUTOF10: 3
PAINLEVEL_OUTOF10: 0
PAINLEVEL_OUTOF10: 3
PAINLEVEL_OUTOF10: 7
PAINLEVEL_OUTOF10: 8

## 2020-01-08 ASSESSMENT — PAIN DESCRIPTION - PAIN TYPE
TYPE: CHRONIC PAIN

## 2020-01-08 NOTE — CONSULTS
[facility] Internal Medicine    CONSULTATION / HISTORY AND PHYSICAL EXAMINATION            Date:   1/7/2020  Patient name:  Ibis Poole  Date of admission:  1/6/2020  7:52 AM  MRN:   [id number]  Account:  [de-identified]  YOB: 1965  PCP:    Delilah Walker MD  Room:   [address] [place] [address]  Code Status:    Full Code    Physician Requesting Consult: Velasquez Bruno MD    Reason for Consult: Uncontrolled diabetes    Chief Complaint:     Chief Complaint   Patient presents with    Suicidal       History Obtained From:   Patient medical records and nursing staff    History of Present Illness:     HTN  Onset more than 2 years ago  dina mild to mod  Controlled with current po meds  Not associated with headaches or blurry vision  No chest pain    Diabetes   Duration more than 7 years  Modifying factors on Glucophage and other med  Severity uncontrolled sever  Associated signs and symtoms neuropathy/ckd/ CAD. aggravated with sugar diet and better with low sugar diet           Past Medical History:     Past Medical History:   Diagnosis Date    Acute depression 9/8/2018    Back pain     DM2 (diabetes mellitus, type 2) ([facility] [address] 4/21/2014    Generalized OA     GERD (gastroesophageal reflux disease)     HTN (hypertension)     Schizoaffective disorder, bipolar type ([facility] [address] 3/29/2019    Unspecified sleep apnea     c  pap        Past Surgical History:     Past Surgical History:   Procedure Laterality Date    TONSILLECTOMY AND ADENOIDECTOMY          Medications Prior to Admission:     Prior to Admission medications    Medication Sig Start Date End Date Taking? Authorizing Provider   hydrOXYzine (ATARAX) 25 MG tablet Take 25 mg by mouth 2 times daily as needed for Anxiety   Yes Historical Provider, MD   gabapentin (NEURONTIN) 800 MG tablet Take 1 tablet by mouth 3 times daily for 30 days. Patient taking differently: Take 800 mg by mouth 3 times daily.  Last filled 12/16/19 (30 day supply) for urticaria , itching  ENDOCRINE:  negative increase in drinking, increase in urination, hot or cold intolerance  MUSCULOSKELETAL:  negative joint pains, muscle aches, swelling of joints  NEUROLOGICAL:  negative for headaches, dizziness, lightheadedness, numbness, pain, tingling extremities      Physical Exam:     BP (!) 166/92   Pulse 102   Temp 98.2 °F (36.8 °C) (Oral)   Resp 14   Ht 5' 5\" (1.651 m)   Wt 229 lb (103.9 kg)   SpO2 100%   BMI 38.11 kg/m²   Temp (24hrs), Av.2 °F (36.8 °C), Min:98.2 °F (36.8 °C), Max:98.2 °F (36.8 °C)    Recent Labs     20  0739 20  1225 20  1708 20  2043   POCGLU 274* 196* 245* 301*     No intake or output data in the 24 hours ending 20 2153 morbid obesity BMI 38.2  General Appearance:  alert, well appearing, and in no acute distress  Mental status: oriented to person, place, and time with normal affect  Head:  normocephalic, atraumatic. Eye: no icterus, redness, pupils equal and reactive, extraocular eye movements intact, conjunctiva clear  Ear: normal external ear, no discharge, hearing intact  Nose:  no drainage noted  Mouth: mucous membranes moist  Neck: supple, no carotid bruits, thyroid not palpable  Lungs: Bilateral equal air entry, clear to ausculation, no wheezing, rales or rhonchi, normal effort  Cardiovascular: normal rate, regular rhythm, no murmur, gallop, rub.   Abdomen: Soft, nontender, nondistended, normal bowel sounds, no hepatomegaly or splenomegaly  Neurologic: There are no new focal motor or sensory deficits, normal muscle tone and bulk, no abnormal sensation, normal speech, cranial nerves II through XII grossly intact  Skin: No gross lesions, rashes, bruising or bleeding on exposed skin area  Extremities:  peripheral pulses palpable, no pedal edema or calf pain with palpation      Investigations:      Laboratory Testing:  Recent Results (from the past 24 hour(s))   POC Glucose Fingerstick    Collection Time: 20

## 2020-01-08 NOTE — PROGRESS NOTES
PROGRESS NOTE  The chart has been reviewed and the patient was interviewed in the conference room. The patient reported some improvement in his mood. He denied current thoughts of harming self or others. He denied visual or auditory hallucinations. The patient denied side effects of his medication. However, the patient is still isolating himself and not engaging in his treatment plan. There was no other behavioral problem reported by the nursing staff. We will continue monitoring for symptoms of depression, psychosis and to adjust his medication as needed. MENTAL STATUS EXAMINATION:    BP (!) 146/96   Pulse 100   Temp 97.3 °F (36.3 °C) (Oral)   Resp 14   Ht 5' 5\" (1.651 m)   Wt 229 lb (103.9 kg)   SpO2 100%   BMI 38.11 kg/m²     MOOD-is dysphoric   Affect-is depressed  Patient feels helpless hopeless and worthless  Psychomotor activity : decreased. APPEARANCE:  Personal hygiene is poor and patient is neglecting his appearance. Patient is somewhat unkempt  PSYCHOSIS:  Denies auditory or visual hallucinations. Denies paranoid delusions. ORIENTATION:  Oriented to time place and person. Recent and remote memory is grossly intact. Intelligence appears to be average  CONCENTRATION:  poor. SPEECH : goal directed , but slow . DIAGNOSIS:    Principal Problem:    Schizoaffective disorder ([facility] [address]  Resolved Problems:    * No resolved hospital problems.  *      LAB:    Recent Results (from the past 72 hour(s))   POC Glucose Fingerstick    Collection Time: 01/06/20  8:08 AM   Result Value Ref Range    POC Glucose 323 (H) 75 - 110 mg/dL   POC Glucose Fingerstick    Collection Time: 01/06/20  1:44 PM   Result Value Ref Range    POC Glucose 232 (H) 75 - 110 mg/dL   POC Glucose Fingerstick    Collection 100 mg Oral Daily    traZODone  50 mg Oral Nightly    insulin lispro  0-12 Units Subcutaneous TID WC    insulin lispro  0-6 Units Subcutaneous Nightly     glucose, dextrose, glucagon (rDNA), dextrose, hydrOXYzine, oxyCODONE-acetaminophen **AND** oxyCODONE, nicotine polacrilex    Chart was reviewed and the patient has been interviewed  Her medications as prescribed above  Patient was discussed with the  and the treatment team.   Provided Supportive and insight-oriented psychotherapy psychotherapy  Recommended involvement in Unit milieu  Provided empathic listening, validation and support  Patient acknowledged and mutually decided to continue with current treatment plan  Discharge planning was discussed with the patient. Izabella Thornton MD        This note was created with the assistance of a speech-recognition program.  Although the intention is to generate a document that actually reflects the content of the visit, no guarantees can be provided that every mistake has been identified and corrected by editing.

## 2020-01-08 NOTE — PLAN OF CARE
1:1 with pt x ten minutes. Pt encouraged to attend unit programming and interact with peers and staff. Pt also encouraged to tend to hygiene and ADLs. Pt encouraged to discuss feelings with staff and feedback and reassurance provided. Pt admits to having thoughts of self harm. Agreeable to seeking out staff should feelings increase. Able to identify positive coping skills and plan for safety. Will cont to monitor q15 minutes for safety and provide support and reassurance as needed. Pt is controlled in behaviors. Compliant with medications and shows no side effects. Pt is aloof of peers. Pt states appetite and sleeping well.

## 2020-01-08 NOTE — PROGRESS NOTES
Group Note    Pt did not participate In Wellness at 1600 . Patient stated they were not interested in attending. Staff explained benefits of participation and offered encouragement to attend. Patient was offered 1:1 time which they refused.

## 2020-01-08 NOTE — GROUP NOTE
Group Therapy Note    Date: 1/8/2020    Group Start Time: 1330  Group End Time: 4018  Group Topic: Cognitive Skills    INES Alas, CTRS    Patient did not attend Cognitive Skills Group after encouragement from staff. 1:1 talk time offered but patient refused.      Signature:  Lauri Houser

## 2020-01-08 NOTE — GROUP NOTE
Group Therapy Note    Date: 1/8/2020    Group Start Time: 1100  Group End Time: 0226  Group Topic: Psychotherapy    FRED Jenkins        Group Therapy Note    Attendees:4/9      Pt denied 1:1. Despite staff encouragement, pt denied 11:00am psychotherapy group.             Signature:  FRED Oliver

## 2020-01-08 NOTE — GROUP NOTE
Group Therapy Note    Date: 1/7/2020    Group Start Time: 2040  Group End Time: 2110  Group Topic: Wrap-Up    [facility]    [address], RN        Group Therapy Note    Attendees: 7           Signature:  [address], RN

## 2020-01-08 NOTE — PLAN OF CARE
Problem: Pain:  Goal: Pain level will decrease  Description  Pain level will decrease  Outcome: Ongoing     Problem: Pain:  Goal: Control of chronic pain  Description  Control of chronic pain  Outcome: Ongoing     Patient complains of chronic back pain. Patient provided PRN medications. Patient reports relief with as needed pain medications. Problem: Altered Mood, Depressive Behavior:  Goal: Able to verbalize and/or display a decrease in depressive symptoms  Description  Able to verbalize and/or display a decrease in depressive symptoms  Outcome: Ongoing   Patient endorses continued depression but reports it has decreased since yesterday. Patient is isolative to room throughout shift but does come out for needs and snack. Patient is compliant with his medications. Problem: Altered Mood, Depressive Behavior:  Goal: Ability to disclose and discuss suicidal ideas will improve  Description  Ability to disclose and discuss suicidal ideas will improve  Outcome: Ongoing   Patient endorses suicidal ideations. Patient reports auditory hallucination telling him to kill himself. Patient denies having a plan and states he will maintain safety while on unit. Patient encouraged to notify staff if symptoms worsen or patient no longer feels he can be safe. Patient safety maintained q15 minute checks.

## 2020-01-09 LAB
GLUCOSE BLD-MCNC: 248 MG/DL (ref 75–110)
GLUCOSE BLD-MCNC: 251 MG/DL (ref 75–110)
GLUCOSE BLD-MCNC: 267 MG/DL (ref 75–110)
GLUCOSE BLD-MCNC: 289 MG/DL (ref 75–110)

## 2020-01-09 PROCEDURE — 6370000000 HC RX 637 (ALT 250 FOR IP): Performed by: PSYCHIATRY & NEUROLOGY

## 2020-01-09 PROCEDURE — 82947 ASSAY GLUCOSE BLOOD QUANT: CPT

## 2020-01-09 PROCEDURE — 6370000000 HC RX 637 (ALT 250 FOR IP): Performed by: INTERNAL MEDICINE

## 2020-01-09 PROCEDURE — 99232 SBSQ HOSP IP/OBS MODERATE 35: CPT | Performed by: INTERNAL MEDICINE

## 2020-01-09 PROCEDURE — 1240000000 HC EMOTIONAL WELLNESS R&B

## 2020-01-09 RX ORDER — GLIMEPIRIDE 4 MG/1
4 TABLET ORAL
Status: DISCONTINUED | OUTPATIENT
Start: 2020-01-10 | End: 2020-01-10

## 2020-01-09 RX ORDER — AMLODIPINE BESYLATE 5 MG/1
5 TABLET ORAL DAILY
Status: DISCONTINUED | OUTPATIENT
Start: 2020-01-09 | End: 2020-01-11 | Stop reason: HOSPADM

## 2020-01-09 RX ADMIN — INSULIN LISPRO 6 UNITS: 100 INJECTION, SOLUTION INTRAVENOUS; SUBCUTANEOUS at 09:24

## 2020-01-09 RX ADMIN — INSULIN LISPRO 3 UNITS: 100 INJECTION, SOLUTION INTRAVENOUS; SUBCUTANEOUS at 20:52

## 2020-01-09 RX ADMIN — TRAZODONE HYDROCHLORIDE 50 MG: 50 TABLET ORAL at 20:44

## 2020-01-09 RX ADMIN — OXYCODONE HYDROCHLORIDE 5 MG: 5 TABLET ORAL at 06:22

## 2020-01-09 RX ADMIN — OXYCODONE AND ACETAMINOPHEN 1 TABLET: 5; 325 TABLET ORAL at 12:50

## 2020-01-09 RX ADMIN — OXYCODONE HYDROCHLORIDE 5 MG: 5 TABLET ORAL at 19:37

## 2020-01-09 RX ADMIN — LINAGLIPTIN 5 MG: 5 TABLET, FILM COATED ORAL at 09:22

## 2020-01-09 RX ADMIN — GLIMEPIRIDE 2 MG: 2 TABLET ORAL at 09:31

## 2020-01-09 RX ADMIN — INSULIN LISPRO 6 UNITS: 100 INJECTION, SOLUTION INTRAVENOUS; SUBCUTANEOUS at 11:31

## 2020-01-09 RX ADMIN — PANTOPRAZOLE SODIUM 40 MG: 40 TABLET, DELAYED RELEASE ORAL at 09:22

## 2020-01-09 RX ADMIN — INSULIN LISPRO 4 UNITS: 100 INJECTION, SOLUTION INTRAVENOUS; SUBCUTANEOUS at 17:14

## 2020-01-09 RX ADMIN — GABAPENTIN 800 MG: 400 CAPSULE ORAL at 14:16

## 2020-01-09 RX ADMIN — AMLODIPINE BESYLATE 5 MG: 5 TABLET ORAL at 21:47

## 2020-01-09 RX ADMIN — GABAPENTIN 800 MG: 400 CAPSULE ORAL at 20:44

## 2020-01-09 RX ADMIN — OXYCODONE HYDROCHLORIDE 5 MG: 5 TABLET ORAL at 12:50

## 2020-01-09 RX ADMIN — OXYCODONE AND ACETAMINOPHEN 1 TABLET: 5; 325 TABLET ORAL at 06:22

## 2020-01-09 RX ADMIN — METFORMIN HYDROCHLORIDE 1000 MG: 500 TABLET ORAL at 20:44

## 2020-01-09 RX ADMIN — OXYCODONE AND ACETAMINOPHEN 1 TABLET: 5; 325 TABLET ORAL at 19:37

## 2020-01-09 RX ADMIN — GABAPENTIN 800 MG: 400 CAPSULE ORAL at 09:22

## 2020-01-09 RX ADMIN — QUETIAPINE FUMARATE 600 MG: 300 TABLET ORAL at 20:44

## 2020-01-09 RX ADMIN — SERTRALINE HYDROCHLORIDE 100 MG: 100 TABLET ORAL at 09:21

## 2020-01-09 RX ADMIN — METFORMIN HYDROCHLORIDE 1000 MG: 500 TABLET ORAL at 09:22

## 2020-01-09 ASSESSMENT — PAIN DESCRIPTION - LOCATION: LOCATION: BACK

## 2020-01-09 ASSESSMENT — PAIN SCALES - GENERAL
PAINLEVEL_OUTOF10: 7
PAINLEVEL_OUTOF10: 1
PAINLEVEL_OUTOF10: 0
PAINLEVEL_OUTOF10: 4

## 2020-01-09 ASSESSMENT — PAIN DESCRIPTION - PAIN TYPE
TYPE: CHRONIC PAIN;ACUTE PAIN
TYPE: CHRONIC PAIN

## 2020-01-09 NOTE — GROUP NOTE
Group Therapy Note  ? Date: 1/9/2020  Group Start Time: 1600  Group End Time: 9929  Group Topic: Healthy Living/Wellness  [facility] RADHA Lake  ? ? Group Therapy Note  Attendees: 2/20  ? Patient declined to attend wellness group despite staff encouragement. After group, writer offered patient 1:1 talk time which was also refused. Writer will continue to encourage patient to attend unit programming. Discipline Responsible: [address] [place] [address]   ?   Signature: Serena Lake

## 2020-01-09 NOTE — CONSULTS
AM   Result Value Ref Range    POC Glucose 276 (H) 75 - 110 mg/dL   POC Glucose Fingerstick    Collection Time: 01/08/20 11:55 AM   Result Value Ref Range    POC Glucose 242 (H) 75 - 110 mg/dL   POC Glucose Fingerstick    Collection Time: 01/08/20  5:29 PM   Result Value Ref Range    POC Glucose 282 (H) 75 - 110 mg/dL   POC Glucose Fingerstick    Collection Time: 01/08/20  8:59 PM   Result Value Ref Range    POC Glucose 242 (H) 75 - 110 mg/dL       Imaging/Diagonstics:      Assessment :      Primary Problem  Schizoaffective disorder [facility])    Active Hospital Problems    Diagnosis Date Noted    [facility] [F25.9] 01/06/2020       Plan:   Uncontrolled diabetes mellitus continue metformin 1000 mg twice a day add linagliptin 5 mg will follow  Recheck A1c  1. Uncontrolled diabetes added 2 mg glimepiride once a day counseled for better diabetic diet    Consultations:   IP CONSULT TO HISTORY AND PHYSICAL  IP CONSULT TO INTERNAL MEDICINE  IP CONSULT TO HISTORY AND PHYSICAL      Kj Pardo MD  1/8/2020  9:53 PM    Copy sent to Dr. Martha Lambert MD    Please note that this chart was generated using voice recognition Dragon dictation software. Although every effort was made to ensure the accuracy of this automated transcription, some errors in transcription may have occurred.

## 2020-01-09 NOTE — GROUP NOTE
Group Therapy Note    Date: 1/9/2020    Group Start Time: 1000  Group End Time: 7468  Group Topic: Recreational    [facility] RAD[facility] Murray, CTRS    Patient did not attend Recreation Therapy Group after encouragement from staff. 1:1 talk time offered but patient refused.      Signature:  Paulette Lund

## 2020-01-09 NOTE — PROGRESS NOTES
PROGRESS NOTE  The chart has been reviewed and the patient was interviewed. The patient was lying in bed when the writer approached him. The staff still reporting that the patient isolating himself with limited interaction with the staff and others. He did not attend therapy groups in the unit. The patient has been compliant with his medications. There was no side effects reported by the patient. There was no behavioral problem reported by the nursing staff. The patient denied suicidal homicidal thoughts. The patient denied visual or auditory hallucinations. The discharge plan and relapse prevention plan has been discussed with the patient the patient plan to be discharged Saturday. MENTAL STATUS EXAMINATION:    BP (!) 147/94   Pulse 106   Temp 97.9 °F (36.6 °C) (Oral)   Resp 14   Ht 5' 5\" (1.651 m)   Wt 229 lb (103.9 kg)   SpO2 100%   BMI 38.11 kg/m²     MOOD-is dysphoric   Affect-is depressed  Patient feels helpless hopeless and worthless  Psychomotor activity : decreased. APPEARANCE:  Personal hygiene is poor and patient is neglecting his appearance. Patient is somewhat unkempt  PSYCHOSIS:  Denies auditory or visual hallucinations. Denies paranoid delusions. ORIENTATION:  Oriented to time place and person. Recent and remote memory is grossly intact. Intelligence appears to be average  CONCENTRATION:  poor. SPEECH : goal directed , but slow . DIAGNOSIS:    Principal Problem:    Schizoaffective disorder ([address]  Resolved Problems:    * No resolved hospital problems.  *      LAB:    Recent Results (from the past 72 hour(s))   POC Glucose Fingerstick    Collection Time: 01/06/20  1:44 PM   Result Value Ref Range    POC Glucose 232 (H) 75 - 110 mg/dL   POC Glucose Fingerstick    Collection Collection Time: 01/09/20  7:53 AM   Result Value Ref Range    POC Glucose 289 (H) 75 - 110 mg/dL   POC Glucose Fingerstick    Collection Time: 01/09/20 11:29 AM   Result Value Ref Range    POC Glucose 251 (H) 75 - 110 mg/dL           TREATMENT PLAN:     glimepiride  2 mg Oral Daily with breakfast    linagliptin  5 mg Oral Daily    gabapentin  800 mg Oral TID    metFORMIN  1,000 mg Oral BID    pantoprazole  40 mg Oral QAM AC    QUEtiapine  600 mg Oral Nightly    sertraline  100 mg Oral Daily    traZODone  50 mg Oral Nightly    insulin lispro  0-12 Units Subcutaneous TID WC    insulin lispro  0-6 Units Subcutaneous Nightly     glucose, dextrose, glucagon (rDNA), dextrose, hydrOXYzine, oxyCODONE-acetaminophen **AND** oxyCODONE, nicotine polacrilex    Chart was reviewed patient has been interviewed  The same medications as prescribed above with no changes are necessary for today  Patient was discussed with the  and the treatment team.   Provided Supportive and insight-oriented psychotherapy psychotherapy  Recommended involvement in Unit milieu  Provided empathic listening, validation and support  Patient acknowledged and mutually decided to continue with current treatment plan  Discharge planning was discussed with the patient. Michael Cramer MD        This note was created with the assistance of a speech-recognition program.  Although the intention is to generate a document that actually reflects the content of the visit, no guarantees can be provided that every mistake has been identified and corrected by editing.

## 2020-01-09 NOTE — GROUP NOTE
Group Therapy Note    Date: 1/9/2020    Group Start Time: 1100  Group End Time: 1140  Group Topic: Psychotherapy    FRED Jenkins        Group Therapy Note    Attendees: 6/11    Pt denied 1:1. Despite staff encouragement, pt denied 11:00am psychotherapy group.             Signature:  FRED Ramirez

## 2020-01-09 NOTE — PLAN OF CARE
1:1 with pt x ten minutes. Pt encouraged to attend unit programming and interact with peers and staff. Pt also encouraged to tend to hygiene and ADLs. Pt encouraged to discuss feelings with staff and feedback and reassurance provided. Pt admits to having thoughts of self harm. Agreeable to seeking out staff should feelings increase. Able to identify positive coping skills and plan for safety. Will cont to monitor q15 minutes for safety and provide support and reassurance as needed. Pt admits to voices. Refuses groups and is seclusive to room. Compliant with medications. Pt is aloof et asocial. Not invested in treatment and refused talk time with staff.

## 2020-01-09 NOTE — GROUP NOTE
Group Therapy Note    Date: 1/9/2020    Group Start Time: 7861  Group End Time: 2350  Group Topic: Psychoeducation    INES QUESADA    Frances Sample    Patient refused to attend leisure/ impulse control skills group at [phone] after encouragement from staff. 1:1 talk time provided as alternative to group session.       Signature:  Frances Fay

## 2020-01-10 LAB
GLUCOSE BLD-MCNC: 132 MG/DL (ref 75–110)
GLUCOSE BLD-MCNC: 183 MG/DL (ref 75–110)
GLUCOSE BLD-MCNC: 187 MG/DL (ref 75–110)
GLUCOSE BLD-MCNC: 250 MG/DL (ref 75–110)

## 2020-01-10 PROCEDURE — 6370000000 HC RX 637 (ALT 250 FOR IP): Performed by: INTERNAL MEDICINE

## 2020-01-10 PROCEDURE — 99232 SBSQ HOSP IP/OBS MODERATE 35: CPT | Performed by: INTERNAL MEDICINE

## 2020-01-10 PROCEDURE — 6370000000 HC RX 637 (ALT 250 FOR IP): Performed by: PSYCHIATRY & NEUROLOGY

## 2020-01-10 PROCEDURE — 82947 ASSAY GLUCOSE BLOOD QUANT: CPT

## 2020-01-10 PROCEDURE — 1240000000 HC EMOTIONAL WELLNESS R&B

## 2020-01-10 RX ORDER — GLIMEPIRIDE 4 MG/1
4 TABLET ORAL 2 TIMES DAILY
Status: DISCONTINUED | OUTPATIENT
Start: 2020-01-10 | End: 2020-01-11 | Stop reason: HOSPADM

## 2020-01-10 RX ADMIN — GLIMEPIRIDE 4 MG: 4 TABLET ORAL at 21:24

## 2020-01-10 RX ADMIN — OXYCODONE HYDROCHLORIDE 5 MG: 5 TABLET ORAL at 01:57

## 2020-01-10 RX ADMIN — SERTRALINE HYDROCHLORIDE 100 MG: 100 TABLET ORAL at 09:14

## 2020-01-10 RX ADMIN — AMLODIPINE BESYLATE 5 MG: 5 TABLET ORAL at 08:20

## 2020-01-10 RX ADMIN — GLIMEPIRIDE 4 MG: 4 TABLET ORAL at 09:14

## 2020-01-10 RX ADMIN — INSULIN LISPRO 2 UNITS: 100 INJECTION, SOLUTION INTRAVENOUS; SUBCUTANEOUS at 12:02

## 2020-01-10 RX ADMIN — OXYCODONE AND ACETAMINOPHEN 1 TABLET: 5; 325 TABLET ORAL at 01:57

## 2020-01-10 RX ADMIN — GABAPENTIN 800 MG: 400 CAPSULE ORAL at 13:23

## 2020-01-10 RX ADMIN — OXYCODONE HYDROCHLORIDE 5 MG: 5 TABLET ORAL at 14:16

## 2020-01-10 RX ADMIN — OXYCODONE AND ACETAMINOPHEN 1 TABLET: 5; 325 TABLET ORAL at 20:29

## 2020-01-10 RX ADMIN — OXYCODONE HYDROCHLORIDE 5 MG: 5 TABLET ORAL at 08:20

## 2020-01-10 RX ADMIN — METFORMIN HYDROCHLORIDE 1000 MG: 500 TABLET ORAL at 21:24

## 2020-01-10 RX ADMIN — INSULIN LISPRO 1 UNITS: 100 INJECTION, SOLUTION INTRAVENOUS; SUBCUTANEOUS at 20:28

## 2020-01-10 RX ADMIN — LINAGLIPTIN 5 MG: 5 TABLET, FILM COATED ORAL at 09:14

## 2020-01-10 RX ADMIN — TRAZODONE HYDROCHLORIDE 50 MG: 50 TABLET ORAL at 21:25

## 2020-01-10 RX ADMIN — GABAPENTIN 800 MG: 400 CAPSULE ORAL at 21:24

## 2020-01-10 RX ADMIN — QUETIAPINE FUMARATE 600 MG: 300 TABLET ORAL at 21:25

## 2020-01-10 RX ADMIN — OXYCODONE AND ACETAMINOPHEN 1 TABLET: 5; 325 TABLET ORAL at 08:19

## 2020-01-10 RX ADMIN — OXYCODONE HYDROCHLORIDE 5 MG: 5 TABLET ORAL at 20:29

## 2020-01-10 RX ADMIN — OXYCODONE AND ACETAMINOPHEN 1 TABLET: 5; 325 TABLET ORAL at 14:17

## 2020-01-10 RX ADMIN — GABAPENTIN 800 MG: 400 CAPSULE ORAL at 08:20

## 2020-01-10 RX ADMIN — METFORMIN HYDROCHLORIDE 1000 MG: 500 TABLET ORAL at 09:14

## 2020-01-10 RX ADMIN — PANTOPRAZOLE SODIUM 40 MG: 40 TABLET, DELAYED RELEASE ORAL at 09:14

## 2020-01-10 RX ADMIN — INSULIN LISPRO 6 UNITS: 100 INJECTION, SOLUTION INTRAVENOUS; SUBCUTANEOUS at 07:57

## 2020-01-10 ASSESSMENT — PAIN DESCRIPTION - PAIN TYPE
TYPE: CHRONIC PAIN

## 2020-01-10 ASSESSMENT — PAIN SCALES - GENERAL
PAINLEVEL_OUTOF10: 0
PAINLEVEL_OUTOF10: 0
PAINLEVEL_OUTOF10: 7
PAINLEVEL_OUTOF10: 0
PAINLEVEL_OUTOF10: 0
PAINLEVEL_OUTOF10: 7
PAINLEVEL_OUTOF10: 0
PAINLEVEL_OUTOF10: 3
PAINLEVEL_OUTOF10: 6
PAINLEVEL_OUTOF10: 8

## 2020-01-10 ASSESSMENT — PAIN DESCRIPTION - LOCATION
LOCATION: BACK

## 2020-01-10 NOTE — GROUP NOTE
Group Therapy Note    Date: 1/10/2020    Group Start Time: 3995  Group End Time: 5130  Group Topic: Psychoeducation    INES Pal, CTRS    Patient did not attend Psychoeducation Group after encouragement from staff. 1:1 talk time offered but patient refused.      Signature:  Chris Viramontes

## 2020-01-10 NOTE — GROUP NOTE
Wellness Group Note   Group Topic: Positive Coping Skills     Date: January 9, 2020 2000 [facility]     Patient declined to attend wellness group despite staff encouragement. After group, writer offered patient 1:1 talk time which was also refused. Writer will continue to encourage patient to attend unit programming.      Signature: Meagan Pierce

## 2020-01-10 NOTE — GROUP NOTE
Group Therapy Note    Date: 1/10/2020    Group Start Time: 1100  Group End Time: 3114  Group Topic: Psychotherapy    INES Royal, MSW, LSW        Group Therapy Note    Attendees: 5/10    Pt declined to attend psychotherapy at 1100 am despite encouragement. Pt offered 1:1 and refused.

## 2020-01-10 NOTE — GROUP NOTE
Group Therapy Note    Date: 1/10/2020    Group Start Time: 1330  Group End Time: 3805  Group Topic: Psychoeducation    INES Quintanilla [place], [place]    Attendees: 12         Patient's Goal:  To demonstrate increased interpersonal skills. Notes:  Patient attended group but chose to not participate in task at hand when encouraged by writer. Patient sat on outskirts of group and observed. Status After Intervention:  Unchanged    Participation Level:  Active Listener and None    Participation Quality: Appropriate and Attentive      Speech:  normal      Thought Process/Content: Logical      Affective Functioning: Congruent      Mood: euthymic      Level of consciousness:  Alert      Response to Learning: Progressing to goal      Endings: None Reported       Modes of Intervention: Education, Socialization, Exploration, Clarifying, Problem-solving, Activity, Confrontation, Limit-setting and Reality-testing      Discipline Responsible: Psychoeducational Specialist      Signature:  Safia Whipple

## 2020-01-10 NOTE — CONSULTS
[facility]    CONSULTATION / HISTORY AND PHYSICAL EXAMINATION            Date:   1/10/2020  Patient name:  Zee Suarez  Date of admission:  1/6/2020  7:52 AM  MRN:   [id number]  Account:  [de-identified]  YOB: 1965  PCP:    Ray Worley MD  Room:   [facility]-  Code Status:    Full Code    Physician Requesting Consult: Elisa Graves MD    Reason for Consult: Uncontrolled diabetes    Chief Complaint:     Chief Complaint   Patient presents with    Suicidal       History Obtained From:   Patient medical records and nursing staff    History of Present Illness:     HTN  Onset more than 2 years ago  dina mild to mod  Controlled with current po meds  Not associated with headaches or blurry vision  No chest pain    Diabetes   Duration more than 7 years  Modifying factors on Glucophage and other med  Severity uncontrolled sever  Associated signs and symtoms neuropathy/ckd/ CAD. aggravated with sugar diet and better with low sugar diet           Past Medical History:     Past Medical History:   Diagnosis Date    Acute depression 9/8/2018    Back pain     DM2 (diabetes mellitus, type 2) ([facility] 4/21/2014    Generalized OA     GERD (gastroesophageal reflux disease)     HTN (hypertension)     Schizoaffective disorder, bipolar type ([facility] 3/29/2019    Unspecified sleep apnea     c  pap        Past Surgical History:     Past Surgical History:   Procedure Laterality Date    TONSILLECTOMY AND ADENOIDECTOMY          Medications Prior to Admission:     Prior to Admission medications    Medication Sig Start Date End Date Taking? Authorizing Provider   hydrOXYzine (ATARAX) 25 MG tablet Take 25 mg by mouth 2 times daily as needed for Anxiety   Yes Historical Provider, MD   gabapentin (NEURONTIN) 800 MG tablet Take 1 tablet by mouth 3 times daily for 30 days. Patient taking differently: Take 800 mg by mouth 3 times daily.  Last filled 12/16/19 (30 day supply) for urticaria , itching  ENDOCRINE:  negative increase in drinking, increase in urination, hot or cold intolerance  MUSCULOSKELETAL:  negative joint pains, muscle aches, swelling of joints  NEUROLOGICAL:  negative for headaches, dizziness, lightheadedness, numbness, pain, tingling extremities      Physical Exam:     /89   Pulse 131   Temp 98.5 °F (36.9 °C) (Oral)   Resp 16   Ht 5' 5\" (1.651 m)   Wt 229 lb (103.9 kg)   SpO2 99%   BMI 38.11 kg/m²   Temp (24hrs), Av.2 °F (36.8 °C), Min:97.8 °F (36.6 °C), Max:98.5 °F (36.9 °C)    Recent Labs     20  1707 20  2034 01/10/20  0744 01/10/20  1159   POCGLU 248* 267* 250* 183*     No intake or output data in the 24 hours ending 01/10/20 1216 morbid obesity BMI 38.2  General Appearance:  alert, well appearing, and in no acute distress  Mental status: oriented to person, place, and time with normal affect  Head:  normocephalic, atraumatic. Eye: no icterus, redness, pupils equal and reactive, extraocular eye movements intact, conjunctiva clear  Ear: normal external ear, no discharge, hearing intact  Nose:  no drainage noted  Mouth: mucous membranes moist  Neck: supple, no carotid bruits, thyroid not palpable  Lungs: Bilateral equal air entry, clear to ausculation, no wheezing, rales or rhonchi, normal effort  Cardiovascular: normal rate, regular rhythm, no murmur, gallop, rub.   Abdomen: Soft, nontender, nondistended, normal bowel sounds, no hepatomegaly or splenomegaly  Neurologic: There are no new focal motor or sensory deficits, normal muscle tone and bulk, no abnormal sensation, normal speech, cranial nerves II through XII grossly intact  Skin: No gross lesions, rashes, bruising or bleeding on exposed skin area  Extremities:  peripheral pulses palpable, no pedal edema or calf pain with palpation      Investigations:      Laboratory Testing:  Recent Results (from the past 24 hour(s))   POC Glucose Fingerstick    Collection Time: 20  5:07 PM Result Value Ref Range    POC Glucose 248 (H) 75 - 110 mg/dL   POC Glucose Fingerstick    Collection Time: 01/09/20  8:34 PM   Result Value Ref Range    POC Glucose 267 (H) 75 - 110 mg/dL   POC Glucose Fingerstick    Collection Time: 01/10/20  7:44 AM   Result Value Ref Range    POC Glucose 250 (H) 75 - 110 mg/dL   POC Glucose Fingerstick    Collection Time: 01/10/20 11:59 AM   Result Value Ref Range    POC Glucose 183 (H) 75 - 110 mg/dL       Imaging/Diagonstics:      Assessment :      Primary Problem  Schizoaffective disorder [facility])    Active Hospital Problems    Diagnosis Date Noted    Schizoaffective disorder ([address] [F25.9] 01/06/2020       Plan:   Uncontrolled diabetes mellitus continue metformin 1000 mg twice a day add linagliptin 5 mg will follow  Recheck A1c  1. Uncontrolled diabetes added 2 mg glimepiride once a day counseled for better diabetic diet  2. Bubba 10  3. incrase glimepide to 4 mg bid        Consultations:   IP CONSULT TO HISTORY AND PHYSICAL  IP CONSULT TO INTERNAL MEDICINE  IP CONSULT TO HISTORY AND PHYSICAL  IP CONSULT TO INTERNAL MEDICINE      Freddie Jacinto MD  1/10/2020  12:16 PM    Copy sent to Dr. Violeta Chaudhari MD    Please note that this chart was generated using voice recognition Dragon dictation software. Although every effort was made to ensure the accuracy of this automated transcription, some errors in transcription may have occurred.

## 2020-01-10 NOTE — GROUP NOTE
Group Therapy Note    Date: 1/10/2020    Group Start Time: 0900  Group End Time: 0915  Group Topic: Community Meeting    [facility]    Kayy Trent, [address]    Patient refused to attend Community Meeting Group at 0900 after encouragement from staff. 1:1 talk time offered.     Signature:  Bernadine Ruffin

## 2020-01-10 NOTE — PROGRESS NOTES
PROGRESS NOTE  The chart has been reviewed and the patient was interviewed in the conference room. The patient reported an improvement in his mood. He denied current thoughts of harming self or others. He denies suicidal homicidal thoughts. He denied visual or auditory hallucinations. The patient denied side effects of his medications. The discharge plan and relapse prevention has been discussed with the patient and he agreed to be discharged tomorrow. The patient will follow up with [facility] behavioral health care. MENTAL STATUS EXAMINATION:    /89   Pulse 131   Temp 98.5 °F (36.9 °C) (Oral)   Resp 16   Ht 5' 5\" (1.651 m)   Wt 229 lb (103.9 kg)   SpO2 99%   BMI 38.11 kg/m²     MOOD-is euthymic   affect-is congruent   The patient denied helpless hopeless and worthless  Psychomotor activity : Normal limits  APPEARANCE:  Personal hygiene is fair   PSYCHOSIS:  Denies auditory or visual hallucinations. Denies paranoid delusions. ORIENTATION:  Oriented to time place and person. Recent and remote memory is grossly intact. Intelligence appears to be average  CONCENTRATION:  Good  SPEECH : goal directed , but slow . DIAGNOSIS:    Principal Problem:    Schizoaffective disorder ([facility]  Resolved Problems:    * No resolved hospital problems.  *      LAB:    Recent Results (from the past 72 hour(s))   POC Glucose Fingerstick    Collection Time: 01/07/20 12:25 PM   Result Value Ref Range    POC Glucose 196 (H) 75 - 110 mg/dL   POC Glucose Fingerstick    Collection Time: 01/07/20  5:08 PM   Result Value Ref Range    POC Glucose 245 (H) 75 - 110 mg/dL   POC Glucose Fingerstick    Collection Time: 01/07/20  8:43 PM   Result Value Ref Range    POC Glucose 301 (H) 75 - 110 mg/dL   POC Glucose Fingerstick Collection Time: 01/08/20  7:34 AM   Result Value Ref Range    POC Glucose 276 (H) 75 - 110 mg/dL   POC Glucose Fingerstick    Collection Time: 01/08/20 11:55 AM   Result Value Ref Range    POC Glucose 242 (H) 75 - 110 mg/dL   POC Glucose Fingerstick    Collection Time: 01/08/20  5:29 PM   Result Value Ref Range    POC Glucose 282 (H) 75 - 110 mg/dL   POC Glucose Fingerstick    Collection Time: 01/08/20  8:59 PM   Result Value Ref Range    POC Glucose 242 (H) 75 - 110 mg/dL   POC Glucose Fingerstick    Collection Time: 01/09/20  7:53 AM   Result Value Ref Range    POC Glucose 289 (H) 75 - 110 mg/dL   POC Glucose Fingerstick    Collection Time: 01/09/20 11:29 AM   Result Value Ref Range    POC Glucose 251 (H) 75 - 110 mg/dL   POC Glucose Fingerstick    Collection Time: 01/09/20  5:07 PM   Result Value Ref Range    POC Glucose 248 (H) 75 - 110 mg/dL   POC Glucose Fingerstick    Collection Time: 01/09/20  8:34 PM   Result Value Ref Range    POC Glucose 267 (H) 75 - 110 mg/dL   POC Glucose Fingerstick    Collection Time: 01/10/20  7:44 AM   Result Value Ref Range    POC Glucose 250 (H) 75 - 110 mg/dL           TREATMENT PLAN:     glimepiride  4 mg Oral Daily with breakfast    amLODIPine  5 mg Oral Daily    linagliptin  5 mg Oral Daily    gabapentin  800 mg Oral TID    metFORMIN  1,000 mg Oral BID    pantoprazole  40 mg Oral QAM AC    QUEtiapine  600 mg Oral Nightly    sertraline  100 mg Oral Daily    traZODone  50 mg Oral Nightly    insulin lispro  0-12 Units Subcutaneous TID WC    insulin lispro  0-6 Units Subcutaneous Nightly     glucose, dextrose, glucagon (rDNA), dextrose, hydrOXYzine, oxyCODONE-acetaminophen **AND** oxyCODONE, nicotine polacrilex    Chart was reviewed the patient has been interviewed  The same medications as prescribed above  Patient was discussed with the  and the treatment team.   Provided Supportive and insight-oriented psychotherapy psychotherapy  Recommended involvement in Unit milieu  Provided empathic listening, validation and support  Patient acknowledged and mutually decided to continue with current treatment plan  Discharge planning was discussed with the patient patient will be discharged tomorrow. Wendi Kidd MD        This note was created with the assistance of a speech-recognition program.  Although the intention is to generate a document that actually reflects the content of the visit, no guarantees can be provided that every mistake has been identified and corrected by editing.

## 2020-01-10 NOTE — BH NOTE
patient refused to attend wellness group at [phone] after encouragement from staff.   1:1 talk time provided as alternative to group session

## 2020-01-10 NOTE — CONSULTS
[facility]    CONSULTATION / HISTORY AND PHYSICAL EXAMINATION            Date:   1/10/2020  Patient name:  Michele Varela  Date of admission:  1/6/2020  7:52 AM  MRN:   [id number]  Account:  [de-identified]  YOB: 1965  PCP:    Isatu Rene MD  Room:   [facility] [place][note 2]010-  Code Status:    Full Code    Physician Requesting Consult: Fred Ferrer MD    Reason for Consult: Uncontrolled diabetes    Chief Complaint:     Chief Complaint   Patient presents with    Suicidal       History Obtained From:   Patient medical records and nursing staff    History of Present Illness:     HTN  Onset more than 2 years ago  dina mild to mod  Controlled with current po meds  Not associated with headaches or blurry vision  No chest pain    Diabetes   Duration more than 7 years  Modifying factors on Glucophage and other med  Severity uncontrolled sever  Associated signs and symtoms neuropathy/ckd/ CAD. aggravated with sugar diet and better with low sugar diet           Past Medical History:     Past Medical History:   Diagnosis Date    Acute depression 9/8/2018    Back pain     DM2 (diabetes mellitus, type 2) ([facility] 4/21/2014    Generalized OA     GERD (gastroesophageal reflux disease)     HTN (hypertension)     Schizoaffective disorder, bipolar type ([facility] 3/29/2019    Unspecified sleep apnea     c  pap        Past Surgical History:     Past Surgical History:   Procedure Laterality Date    TONSILLECTOMY AND ADENOIDECTOMY          Medications Prior to Admission:     Prior to Admission medications    Medication Sig Start Date End Date Taking? Authorizing Provider   hydrOXYzine (ATARAX) 25 MG tablet Take 25 mg by mouth 2 times daily as needed for Anxiety   Yes Historical Provider, MD   gabapentin (NEURONTIN) 800 MG tablet Take 1 tablet by mouth 3 times daily for 30 days. Patient taking differently: Take 800 mg by mouth 3 times daily.  Last filled 12/16/19 (30 day supply) 4/1/19 1/6/20 Yes Marilee Gerard MD   sertraline (ZOLOFT) 100 MG tablet Take 1 tablet by mouth daily 4/1/19  Yes Marilee Gerard MD   traZODone (DESYREL) 50 MG tablet Take 1 tablet by mouth nightly 4/1/19 1/6/20 Yes Marilee Gerard MD   metFORMIN (GLUCOPHAGE) 1000 MG tablet Take 1 tablet by mouth 2 times daily 4/1/19 1/6/20 Yes Marilee Gerard MD   QUEtiapine (SEROQUEL) 300 MG tablet Take 2 tablets by mouth nightly 4/1/19  Yes Marilee Gerard MD   pantoprazole (PROTONIX) 40 MG tablet Take 1 tablet by mouth every morning (before breakfast) 4/1/19  Yes Marilee Gerard MD   oxyCODONE-acetaminophen (PERCOCET)  MG per tablet Take 1 tablet by mouth every 6 hours as needed for Pain. Maximum of 4 tabs a day- prescribed by pain management NP  Last filled 12/24/19 (30 day supply)   Yes Historical Provider, MD        Allergies:     Patient has no known allergies. Social History:     Tobacco:    reports that he has been smoking cigarettes. He has been smoking about 0.50 packs per day. He has never used smokeless tobacco.  Alcohol:      reports no history of alcohol use. Drug Use:  reports current drug use. Drugs: Marijuana and Cocaine. Family History:     Family History   Problem Relation Age of Onset    Diabetes Mother        Review of Systems:     Positive and Negative as described in HPI. CONSTITUTIONAL:  negative for fevers, chills, sweats, fatigue, weight loss  HEENT:  negative for vision, hearing changes, runny nose, throat pain  RESPIRATORY:  negative for shortness of breath, cough, congestion, wheezing. CARDIOVASCULAR:  negative for chest pain, palpitations.   GASTROINTESTINAL:  negative for nausea, vomiting, diarrhea, constipation, change in bowel habits, abdominal pain   GENITOURINARY:  negative for difficulty of urination, burning with urination, frequency   INTEGUMENT:  negative for rash, skin lesions, easy bruising   HEMATOLOGIC/LYMPHATIC:  negative for swelling/edema   ALLERGIC/IMMUNOLOGIC:  negative for urticaria , itching  ENDOCRINE:  negative increase in drinking, increase in urination, hot or cold intolerance  MUSCULOSKELETAL:  negative joint pains, muscle aches, swelling of joints  NEUROLOGICAL:  negative for headaches, dizziness, lightheadedness, numbness, pain, tingling extremities      Physical Exam:     /89   Pulse 131   Temp 98.5 °F (36.9 °C) (Oral)   Resp 16   Ht 5' 5\" (1.651 m)   Wt 229 lb (103.9 kg)   SpO2 99%   BMI 38.11 kg/m²   Temp (24hrs), Av.2 °F (36.8 °C), Min:97.8 °F (36.6 °C), Max:98.5 °F (36.9 °C)    Recent Labs     20  1707 20  2034 01/10/20  0744 01/10/20  1159   POCGLU 248* 267* 250* 183*     No intake or output data in the 24 hours ending 01/10/20 1216 morbid obesity BMI 38.2  General Appearance:  alert, well appearing, and in no acute distress  Mental status: oriented to person, place, and time with normal affect  Head:  normocephalic, atraumatic. Eye: no icterus, redness, pupils equal and reactive, extraocular eye movements intact, conjunctiva clear  Ear: normal external ear, no discharge, hearing intact  Nose:  no drainage noted  Mouth: mucous membranes moist  Neck: supple, no carotid bruits, thyroid not palpable  Lungs: Bilateral equal air entry, clear to ausculation, no wheezing, rales or rhonchi, normal effort  Cardiovascular: normal rate, regular rhythm, no murmur, gallop, rub.   Abdomen: Soft, nontender, nondistended, normal bowel sounds, no hepatomegaly or splenomegaly  Neurologic: There are no new focal motor or sensory deficits, normal muscle tone and bulk, no abnormal sensation, normal speech, cranial nerves II through XII grossly intact  Skin: No gross lesions, rashes, bruising or bleeding on exposed skin area  Extremities:  peripheral pulses palpable, no pedal edema or calf pain with palpation      Investigations:      Laboratory Testing:  Recent Results (from the past 24 hour(s))   POC Glucose Fingerstick    Collection Time: 20  5:07 PM Result Value Ref Range    POC Glucose 248 (H) 75 - 110 mg/dL   POC Glucose Fingerstick    Collection Time: 01/09/20  8:34 PM   Result Value Ref Range    POC Glucose 267 (H) 75 - 110 mg/dL   POC Glucose Fingerstick    Collection Time: 01/10/20  7:44 AM   Result Value Ref Range    POC Glucose 250 (H) 75 - 110 mg/dL   POC Glucose Fingerstick    Collection Time: 01/10/20 11:59 AM   Result Value Ref Range    POC Glucose 183 (H) 75 - 110 mg/dL       Imaging/Diagonstics:      Assessment :      Primary Problem  Schizoaffective disorder [facility])    Active Hospital Problems    Diagnosis Date Noted    [facility] [F25.9] 01/06/2020       Plan:   Uncontrolled diabetes mellitus continue metformin 1000 mg twice a day add linagliptin 5 mg will follow  Recheck A1c  1. Uncontrolled diabetes added 2 mg glimepiride once a day counseled for better diabetic diet  2. Jan 9  3. incrase glimepide to 4 mg  4. Consultations:   IP CONSULT TO HISTORY AND PHYSICAL  IP CONSULT TO INTERNAL MEDICINE  IP CONSULT TO HISTORY AND PHYSICAL  IP CONSULT TO INTERNAL MEDICINE      Roberto Carlos Ryan MD  1/10/2020  12:16 PM    Copy sent to Dr. Renita Lopes MD    Please note that this chart was generated using voice recognition Dragon dictation software. Although every effort was made to ensure the accuracy of this automated transcription, some errors in transcription may have occurred.

## 2020-01-11 VITALS
TEMPERATURE: 98.1 F | RESPIRATION RATE: 16 BRPM | WEIGHT: 229 LBS | HEIGHT: 65 IN | BODY MASS INDEX: 38.15 KG/M2 | OXYGEN SATURATION: 100 % | DIASTOLIC BLOOD PRESSURE: 87 MMHG | SYSTOLIC BLOOD PRESSURE: 118 MMHG | HEART RATE: 94 BPM

## 2020-01-11 LAB
GLUCOSE BLD-MCNC: 167 MG/DL (ref 75–110)
GLUCOSE BLD-MCNC: 195 MG/DL (ref 75–110)

## 2020-01-11 PROCEDURE — 6370000000 HC RX 637 (ALT 250 FOR IP): Performed by: INTERNAL MEDICINE

## 2020-01-11 PROCEDURE — 82947 ASSAY GLUCOSE BLOOD QUANT: CPT

## 2020-01-11 PROCEDURE — 99222 1ST HOSP IP/OBS MODERATE 55: CPT | Performed by: INTERNAL MEDICINE

## 2020-01-11 PROCEDURE — 5130000000 HC BRIDGE APPOINTMENT

## 2020-01-11 PROCEDURE — 6370000000 HC RX 637 (ALT 250 FOR IP): Performed by: PSYCHIATRY & NEUROLOGY

## 2020-01-11 RX ORDER — GLIMEPIRIDE 4 MG/1
4 TABLET ORAL 2 TIMES DAILY
Qty: 30 TABLET | Refills: 3 | Status: ON HOLD | OUTPATIENT
Start: 2020-01-11 | End: 2020-03-21 | Stop reason: ALTCHOICE

## 2020-01-11 RX ORDER — AMLODIPINE BESYLATE 5 MG/1
5 TABLET ORAL DAILY
Qty: 30 TABLET | Refills: 3 | Status: ON HOLD | OUTPATIENT
Start: 2020-01-12 | End: 2020-03-21 | Stop reason: ALTCHOICE

## 2020-01-11 RX ORDER — PANTOPRAZOLE SODIUM 40 MG/1
40 TABLET, DELAYED RELEASE ORAL
Qty: 30 TABLET | Refills: 3 | Status: SHIPPED | OUTPATIENT
Start: 2020-01-12

## 2020-01-11 RX ADMIN — GABAPENTIN 800 MG: 400 CAPSULE ORAL at 13:38

## 2020-01-11 RX ADMIN — INSULIN LISPRO 2 UNITS: 100 INJECTION, SOLUTION INTRAVENOUS; SUBCUTANEOUS at 11:54

## 2020-01-11 RX ADMIN — AMLODIPINE BESYLATE 5 MG: 5 TABLET ORAL at 08:44

## 2020-01-11 RX ADMIN — METFORMIN HYDROCHLORIDE 1000 MG: 500 TABLET ORAL at 08:44

## 2020-01-11 RX ADMIN — LINAGLIPTIN 5 MG: 5 TABLET, FILM COATED ORAL at 08:45

## 2020-01-11 RX ADMIN — INSULIN LISPRO 2 UNITS: 100 INJECTION, SOLUTION INTRAVENOUS; SUBCUTANEOUS at 08:17

## 2020-01-11 RX ADMIN — OXYCODONE AND ACETAMINOPHEN 1 TABLET: 5; 325 TABLET ORAL at 09:15

## 2020-01-11 RX ADMIN — GLIMEPIRIDE 4 MG: 4 TABLET ORAL at 08:47

## 2020-01-11 RX ADMIN — OXYCODONE HYDROCHLORIDE 5 MG: 5 TABLET ORAL at 09:15

## 2020-01-11 RX ADMIN — SERTRALINE HYDROCHLORIDE 100 MG: 100 TABLET ORAL at 08:44

## 2020-01-11 RX ADMIN — OXYCODONE HYDROCHLORIDE 5 MG: 5 TABLET ORAL at 15:24

## 2020-01-11 RX ADMIN — OXYCODONE HYDROCHLORIDE 5 MG: 5 TABLET ORAL at 03:07

## 2020-01-11 RX ADMIN — OXYCODONE AND ACETAMINOPHEN 1 TABLET: 5; 325 TABLET ORAL at 03:08

## 2020-01-11 RX ADMIN — OXYCODONE AND ACETAMINOPHEN 1 TABLET: 5; 325 TABLET ORAL at 15:24

## 2020-01-11 RX ADMIN — GABAPENTIN 800 MG: 400 CAPSULE ORAL at 08:44

## 2020-01-11 ASSESSMENT — PAIN DESCRIPTION - LOCATION
LOCATION: BACK
LOCATION: BACK

## 2020-01-11 ASSESSMENT — PAIN SCALES - GENERAL
PAINLEVEL_OUTOF10: 8
PAINLEVEL_OUTOF10: 7
PAINLEVEL_OUTOF10: 0
PAINLEVEL_OUTOF10: 8
PAINLEVEL_OUTOF10: 8

## 2020-01-11 ASSESSMENT — PAIN DESCRIPTION - PAIN TYPE
TYPE: CHRONIC PAIN
TYPE: CHRONIC PAIN

## 2020-01-11 ASSESSMENT — PAIN - FUNCTIONAL ASSESSMENT
PAIN_FUNCTIONAL_ASSESSMENT: 0-10
PAIN_FUNCTIONAL_ASSESSMENT: 0-10

## 2020-01-11 NOTE — DISCHARGE SUMMARY
DISCHARGE      SUMMARY                                                               DEMOGRAPHICS          Chhaya Mishra is a 47 y.o.   male     DATE OF ADMISSION: 1/6/2020  DATE OF DISCHARGE: 01/11/20  DISCHARGE DIAGNOSES:   Principal Problem:    Schizoaffective disorder ([address]  Resolved Problems:    * No resolved hospital problems. *  Chhaya Mishra is a 47 y.o. male who presents with Schizoaffective disorder [facility]  The patient has been transferred from the emergency department where he presented with a chief complaint of suicidal ideation. It was reported that the patient stopped his medication and started hearing voices telling him to kill himself. The patient was medically cleared and transferred to the psychiatric unit for further evaluation and management. HOSPITAL COURSE    The patient presented as a 80-year-old -American man who is single, father of a 26-year-old daughter. He is on disability depending on SSI assistance and lives by himself. The patient reported that he stopped his medication over the last 3 weeks since his father passed away due to a stroke. He reported that voices getting worse and telling him to kill himself by stabbing himself. The patient reported history of schizophrenia and schizoaffective disorder bipolar type. He was prescribed Seroquel, Zoloft and the trazodone. He is a client of [facility]. He reported feeling depressed and complaining of back pain. He reported feeling hopeless, helpless and worthless. He reported life is not worth living. The patient reported problem falling and staying asleep. The patient denied previous suicidal attempts but reported repeated suicidal ideations. The patient had numerous admissions to the hospital for symptoms of depression and psychosis. The patient denied using street drugs or drinking alcohol.   The patient reported person    No side effects from medication reported    EXAM:    /87   Pulse 94   Temp 98.1 °F (36.7 °C) (Oral)   Resp 16   Ht 5' 5\" (1.651 m)   Wt 229 lb (103.9 kg)   SpO2 100%   BMI 38.11 kg/m²   Patient did not have any physical complaint at the time of discharge    Recent Results (from the past 72 hour(s))   POC Glucose Fingerstick    Collection Time: 01/08/20  5:29 PM   Result Value Ref Range    POC Glucose 282 (H) 75 - 110 mg/dL   POC Glucose Fingerstick    Collection Time: 01/08/20  8:59 PM   Result Value Ref Range    POC Glucose 242 (H) 75 - 110 mg/dL   POC Glucose Fingerstick    Collection Time: 01/09/20  7:53 AM   Result Value Ref Range    POC Glucose 289 (H) 75 - 110 mg/dL   POC Glucose Fingerstick    Collection Time: 01/09/20 11:29 AM   Result Value Ref Range    POC Glucose 251 (H) 75 - 110 mg/dL   POC Glucose Fingerstick    Collection Time: 01/09/20  5:07 PM   Result Value Ref Range    POC Glucose 248 (H) 75 - 110 mg/dL   POC Glucose Fingerstick    Collection Time: 01/09/20  8:34 PM   Result Value Ref Range    POC Glucose 267 (H) 75 - 110 mg/dL   POC Glucose Fingerstick    Collection Time: 01/10/20  7:44 AM   Result Value Ref Range    POC Glucose 250 (H) 75 - 110 mg/dL   POC Glucose Fingerstick    Collection Time: 01/10/20 11:59 AM   Result Value Ref Range    POC Glucose 183 (H) 75 - 110 mg/dL   POC Glucose Fingerstick    Collection Time: 01/10/20  4:49 PM   Result Value Ref Range    POC Glucose 132 (H) 75 - 110 mg/dL   POC Glucose Fingerstick    Collection Time: 01/10/20  8:21 PM   Result Value Ref Range    POC Glucose 187 (H) 75 - 110 mg/dL   POC Glucose Fingerstick    Collection Time: 01/11/20  7:19 AM   Result Value Ref Range    POC Glucose 167 (H) 75 - 110 mg/dL   POC Glucose Fingerstick    Collection Time: 01/11/20 11:44 AM   Result Value Ref Range    POC Glucose 195 (H) 75 - 110 mg/dL         DISCHARGE INSTRUCTIONS:  Disposition: Discharge to :  HOME/SELF CARE  Condition on discharge:

## 2020-01-11 NOTE — CONSULTS
[facility]    CONSULTATION / HISTORY AND PHYSICAL EXAMINATION            Date:   1/11/2020  Patient name:  Leslye Lim  Date of admission:  1/6/2020  7:52 AM  MRN:   [id number]  Account:  [de-identified]  YOB: 1965  PCP:    Chasidy Jolley MD  Room:   [address] [place] [address]  Code Status:    Full Code    Physician Requesting Consult: Chana Funez MD    Reason for Consult: Uncontrolled diabetes    Chief Complaint:     Chief Complaint   Patient presents with    Suicidal       History Obtained From:   Patient medical records and nursing staff    History of Present Illness:     HTN  Onset more than 2 years ago  dina mild to mod  Controlled with current po meds  Not associated with headaches or blurry vision  No chest pain    Diabetes   Duration more than 7 years  Modifying factors on Glucophage and other med  Severity uncontrolled sever  Associated signs and symtoms neuropathy/ckd/ CAD.    aggravated with sugar diet and better with low sugar diet        1/11/2020    · Dm control fair and improved 1.   htn control improved           Significant last 24 hr data reviewed ;   Vitals:    01/09/20 2147 01/10/20 0819 01/10/20 1945 01/11/20 0745   BP: (!) 168/98 135/89 (!) 132/91 118/87   Pulse:  131 104 94   Resp:  16 14 16   Temp:  98.5 °F (36.9 °C) 98.6 °F (37 °C) 98.1 °F (36.7 °C)   TempSrc:  Oral  Oral   SpO2:  99%  100%   Weight:       Height:          Recent Results (from the past 24 hour(s))   POC Glucose Fingerstick    Collection Time: 01/10/20  4:49 PM   Result Value Ref Range    POC Glucose 132 (H) 75 - 110 mg/dL   POC Glucose Fingerstick    Collection Time: 01/10/20  8:21 PM   Result Value Ref Range    POC Glucose 187 (H) 75 - 110 mg/dL   POC Glucose Fingerstick    Collection Time: 01/11/20  7:19 AM   Result Value Ref Range    POC Glucose 167 (H) 75 - 110 mg/dL   POC Glucose Fingerstick    Collection Time: 01/11/20 11:44 AM   Result Value Ref Range    POC Glucose 195 (H) 75 - 110 mg/dL     Recent Labs     01/10/20  1159 01/10/20  1649 01/10/20  2021 01/11/20  0719 01/11/20  1144   POCGLU 183* 132* 187* 167* 195*        No results found. Past Medical History:     Past Medical History:   Diagnosis Date    Acute depression 9/8/2018    Back pain     DM2 (diabetes mellitus, type 2) ([facility] 4/21/2014    Generalized OA     GERD (gastroesophageal reflux disease)     HTN (hypertension)     Schizoaffective disorder, bipolar type ([facility] 3/29/2019    Unspecified sleep apnea     c  pap        Past Surgical History:     Past Surgical History:   Procedure Laterality Date    TONSILLECTOMY AND ADENOIDECTOMY          Medications Prior to Admission:     Prior to Admission medications    Medication Sig Start Date End Date Taking? Authorizing Provider   hydrOXYzine (ATARAX) 25 MG tablet Take 25 mg by mouth 2 times daily as needed for Anxiety   Yes Historical Provider, MD   gabapentin (NEURONTIN) 800 MG tablet Take 1 tablet by mouth 3 times daily for 30 days. Patient taking differently: Take 800 mg by mouth 3 times daily. Last filled 12/16/19 (30 day supply) 4/1/19 1/6/20 Yes Bernadette Reinoso MD   sertraline (ZOLOFT) 100 MG tablet Take 1 tablet by mouth daily 4/1/19  Yes Bernadette Reinoso MD   traZODone (DESYREL) 50 MG tablet Take 1 tablet by mouth nightly 4/1/19 1/6/20 Yes Bernadette Reinoso MD   metFORMIN (GLUCOPHAGE) 1000 MG tablet Take 1 tablet by mouth 2 times daily 4/1/19 1/6/20 Yes Bernadette Reinoso MD   QUEtiapine (SEROQUEL) 300 MG tablet Take 2 tablets by mouth nightly 4/1/19  Yes Bernadette Reinoso MD   pantoprazole (PROTONIX) 40 MG tablet Take 1 tablet by mouth every morning (before breakfast) 4/1/19  Yes Bernadette Reinoso MD   oxyCODONE-acetaminophen (PERCOCET)  MG per tablet Take 1 tablet by mouth every 6 hours as needed for Pain.  Maximum of 4 tabs a day- prescribed by pain management NP  Last filled 12/24/19 (30 day supply)   Yes Historical Provider, MD        Allergies: Patient has no known allergies. Social History:     Tobacco:    reports that he has been smoking cigarettes. He has been smoking about 0.50 packs per day. He has never used smokeless tobacco.  Alcohol:      reports no history of alcohol use. Drug Use:  reports current drug use. Drugs: Marijuana and Cocaine. Family History:     Family History   Problem Relation Age of Onset    Diabetes Mother        Review of Systems:     Positive and Negative as described in HPI. CONSTITUTIONAL:  negative for fevers, chills, sweats, fatigue, weight loss  HEENT:  negative for vision, hearing changes, runny nose, throat pain  RESPIRATORY:  negative for shortness of breath, cough, congestion, wheezing. CARDIOVASCULAR:  negative for chest pain, palpitations.   GASTROINTESTINAL:  negative for nausea, vomiting, diarrhea, constipation, change in bowel habits, abdominal pain   GENITOURINARY:  negative for difficulty of urination, burning with urination, frequency   INTEGUMENT:  negative for rash, skin lesions, easy bruising   HEMATOLOGIC/LYMPHATIC:  negative for swelling/edema   ALLERGIC/IMMUNOLOGIC:  negative for urticaria , itching  ENDOCRINE:  negative increase in drinking, increase in urination, hot or cold intolerance  MUSCULOSKELETAL:  negative joint pains, muscle aches, swelling of joints  NEUROLOGICAL:  negative for headaches, dizziness, lightheadedness, numbness, pain, tingling extremities      Physical Exam:     /87   Pulse 94   Temp 98.1 °F (36.7 °C) (Oral)   Resp 16   Ht 5' 5\" (1.651 m)   Wt 229 lb (103.9 kg)   SpO2 100%   BMI 38.11 kg/m²   Temp (24hrs), Av.4 °F (36.9 °C), Min:98.1 °F (36.7 °C), Max:98.6 °F (37 °C)    Recent Labs     01/10/20  1649 01/10/20  2021 20  0719 20  1144   POCGLU 132* 187* 167* 195*     No intake or output data in the 24 hours ending 20 1341 morbid obesity BMI 38.2  General Appearance:  alert, well appearing, and in no acute distress  Mental status: oriented to person, place, and time with normal affect  Head:  normocephalic, atraumatic. Eye: no icterus, redness, pupils equal and reactive, extraocular eye movements intact, conjunctiva clear  Ear: normal external ear, no discharge, hearing intact  Nose:  no drainage noted  Mouth: mucous membranes moist  Neck: supple, no carotid bruits, thyroid not palpable  Lungs: Bilateral equal air entry, clear to ausculation, no wheezing, rales or rhonchi, normal effort  Cardiovascular: normal rate, regular rhythm, no murmur, gallop, rub.   Abdomen: Soft, nontender, nondistended, normal bowel sounds, no hepatomegaly or splenomegaly  Neurologic: There are no new focal motor or sensory deficits, normal muscle tone and bulk, no abnormal sensation, normal speech, cranial nerves II through XII grossly intact  Skin: No gross lesions, rashes, bruising or bleeding on exposed skin area  Extremities:  peripheral pulses palpable, no pedal edema or calf pain with palpation      Investigations:      Laboratory Testing:  Recent Results (from the past 24 hour(s))   POC Glucose Fingerstick    Collection Time: 01/10/20  4:49 PM   Result Value Ref Range    POC Glucose 132 (H) 75 - 110 mg/dL   POC Glucose Fingerstick    Collection Time: 01/10/20  8:21 PM   Result Value Ref Range    POC Glucose 187 (H) 75 - 110 mg/dL   POC Glucose Fingerstick    Collection Time: 01/11/20  7:19 AM   Result Value Ref Range    POC Glucose 167 (H) 75 - 110 mg/dL   POC Glucose Fingerstick    Collection Time: 01/11/20 11:44 AM   Result Value Ref Range    POC Glucose 195 (H) 75 - 110 mg/dL       Imaging/Diagonstics:      Assessment :      Primary Problem  Schizoaffective disorder [facility])    Active Hospital Problems    Diagnosis Date Noted    Schizoaffective disorder ([facility] [F25.9] 01/06/2020     Recent Labs     01/10/20  1159 01/10/20  1649 01/10/20  2021 01/11/20  0719 01/11/20  1144   POCGLU 183* 132* 187* 167* 195*     Lab Results   Component Value Date    LABA1C 11.9

## 2020-01-11 NOTE — GROUP NOTE
Group Therapy Note    Date: 1/11/2020    Group Start Time: 1000  Group End Time: 1050  Group Topic: Psychoeducation    NATASHA Clemens LSW        Group Therapy Note    patient refused to attend psychotherapy group at 10:00am after encouragement from staff.          Signature:  NATASHA Hua LSW

## 2020-01-11 NOTE — DISCHARGE INSTR - PHARMACY
Medications has been sent and faxed to [address] [place] [address] - [facility]

## 2020-01-11 NOTE — BH NOTE
Group Therapy Note     Date: 1/11/2020     Group Start Time: 1100  Group End Time: 1130  Group Topic: Wellness Group      IRINEO Hernandez, RN            Group Therapy Note     patient refused to attend wellness group at 11am after encouragement from staff. Patient resting in room         Signature: IRINEO Fairchild, RN

## 2020-01-11 NOTE — GROUP NOTE
Group Therapy Note  Date: 1/11/2020   Group Topic: Community Meeting & Trivia  Attendees: 6/18  Unit: [facility] Unit [facility]  Notes: Doug Hunter refused to attend group despite staff invitation and encouragement. 1:1 talk time offered in place of group.   Discipline Responsible: Psychoeducational Specialist  Signature: Nona Ko

## 2020-01-11 NOTE — GROUP NOTE
Group Therapy Note    Date: 1/10/2020    Group Start Time: 2030  Group End Time: 2115  Group Topic: Wrap-Up    [facility]    [address], RN        Group Therapy Note    Attendees: 11         Signature:  [address], RN

## 2020-01-11 NOTE — DISCHARGE INSTR - OTHER ORDERS
Make sure to take all medications prescribed by your Dr. Juan Pablo Nettles not double up on doses. If you miss or skip a dose make sure to take the next scheduled dose. Make sure to not do any illicit drugs or alcohol. Make sure to attend all follow up appointments.

## 2020-01-13 NOTE — CARE COORDINATION
Moiz Long, [address] [facility], [place] [address]    Phone:  [phone]   · amLODIPine 5 MG tablet  · glimepiride 4 MG tablet  · linagliptin 5 MG tablet  · pantoprazole 40 MG tablet      Pharmacy Instructions:      Medications has been sent and faxed to [facility]                   Follow Up Appointment: Yuki Brooke MD  [address] [place] [address]  [phone]          [id number]   [facility] [address], [address] [place] [address] Ave  Phone: [phone]  FAX D/C paperwork to: [phone]    On 1/22/2020  @8:15 am for medication management    [facility]/[address], [address] [place] [address] Ave  Phone: [phone]  FAX D/C paperwork to: [phone]    On 2/19/2020  @8:20am for medication management

## 2020-03-09 ENCOUNTER — HOSPITAL ENCOUNTER (OUTPATIENT)
Age: 55
Discharge: HOME | End: 2020-03-09
Payer: COMMERCIAL

## 2020-03-09 LAB
ABSOLUTE EOS #: 0.05 K/UL (ref 0–0.44)
ABSOLUTE IMMATURE GRANULOCYTE: <0.03 K/UL (ref 0–0.3)
ABSOLUTE LYMPH #: 1.67 K/UL (ref 1.1–3.7)
ABSOLUTE MONO #: 0.48 K/UL (ref 0.1–1.2)
ALBUMIN SERPL-MCNC: 3.9 G/DL (ref 3.5–5.2)
ALBUMIN/GLOBULIN RATIO: 1.4 (ref 1–2.5)
ALP BLD-CCNC: 72 U/L (ref 40–129)
ALT SERPL-CCNC: 40 U/L (ref 5–41)
ANION GAP SERPL CALCULATED.3IONS-SCNC: 13 MMOL/L (ref 9–17)
AST SERPL-CCNC: 49 U/L
BASOPHILS # BLD: 1 % (ref 0–2)
BASOPHILS ABSOLUTE: 0.03 K/UL (ref 0–0.2)
BILIRUB SERPL-MCNC: 0.64 MG/DL (ref 0.3–1.2)
BILIRUBIN URINE: NEGATIVE
BUN BLDV-MCNC: 14 MG/DL (ref 6–20)
BUN/CREAT BLD: ABNORMAL (ref 9–20)
CALCIUM SERPL-MCNC: 9 MG/DL (ref 8.6–10.4)
CHLORIDE BLD-SCNC: 107 MMOL/L (ref 98–107)
CHOLESTEROL/HDL RATIO: 2.6
CHOLESTEROL: 189 MG/DL
CO2: 22 MMOL/L (ref 20–31)
COLOR: YELLOW
COMMENT UA: NORMAL
CREAT SERPL-MCNC: 0.82 MG/DL (ref 0.7–1.2)
CREATININE URINE: 158.7 MG/DL (ref 39–259)
DIFFERENTIAL TYPE: ABNORMAL
EOSINOPHILS RELATIVE PERCENT: 1 % (ref 1–4)
ESTIMATED AVERAGE GLUCOSE: 189 MG/DL
GFR AFRICAN AMERICAN: >60 ML/MIN
GFR NON-AFRICAN AMERICAN: >60 ML/MIN
GFR SERPL CREATININE-BSD FRML MDRD: ABNORMAL ML/MIN/{1.73_M2}
GFR SERPL CREATININE-BSD FRML MDRD: ABNORMAL ML/MIN/{1.73_M2}
GLUCOSE BLD-MCNC: 133 MG/DL (ref 70–99)
GLUCOSE URINE: NEGATIVE
HBA1C MFR BLD: 8.2 % (ref 4–6)
HCT VFR BLD CALC: 39.8 % (ref 40.7–50.3)
HDLC SERPL-MCNC: 74 MG/DL
HEMOGLOBIN: 13.8 G/DL (ref 13–17)
IMMATURE GRANULOCYTES: 0 %
KETONES, URINE: NEGATIVE
LDL CHOLESTEROL: 84 MG/DL (ref 0–130)
LEUKOCYTE ESTERASE, URINE: NEGATIVE
LYMPHOCYTES # BLD: 30 % (ref 24–43)
MCH RBC QN AUTO: 28.6 PG (ref 25.2–33.5)
MCHC RBC AUTO-ENTMCNC: 34.7 G/DL (ref 28.4–34.8)
MCV RBC AUTO: 82.4 FL (ref 82.6–102.9)
MICROALBUMIN/CREAT 24H UR: <12 MG/L
MICROALBUMIN/CREAT UR-RTO: NORMAL MCG/MG CREAT
MONOCYTES # BLD: 9 % (ref 3–12)
NITRITE, URINE: NEGATIVE
NRBC AUTOMATED: 0 PER 100 WBC
PDW BLD-RTO: 15.7 % (ref 11.8–14.4)
PH UA: 5 (ref 5–8)
PLATELET # BLD: 371 K/UL (ref 138–453)
PLATELET ESTIMATE: ABNORMAL
PMV BLD AUTO: 9.7 FL (ref 8.1–13.5)
POTASSIUM SERPL-SCNC: 4.4 MMOL/L (ref 3.7–5.3)
PROSTATE SPECIFIC ANTIGEN: 0.15 UG/L
PROTEIN UA: NEGATIVE
RBC # BLD: 4.83 M/UL (ref 4.21–5.77)
RBC # BLD: ABNORMAL 10*6/UL
SEG NEUTROPHILS: 59 % (ref 36–65)
SEGMENTED NEUTROPHILS ABSOLUTE COUNT: 3.27 K/UL (ref 1.5–8.1)
SODIUM BLD-SCNC: 142 MMOL/L (ref 135–144)
SPECIFIC GRAVITY UA: 1.02 (ref 1–1.03)
THYROXINE, FREE: 1.37 NG/DL (ref 0.93–1.7)
TOTAL PROTEIN: 6.6 G/DL (ref 6.4–8.3)
TRIGL SERPL-MCNC: 157 MG/DL
TSH SERPL DL<=0.05 MIU/L-ACNC: 1.22 MIU/L (ref 0.3–5)
TURBIDITY: CLEAR
URINE HGB: NEGATIVE
UROBILINOGEN, URINE: NORMAL
VITAMIN D 25-HYDROXY: 6.3 NG/ML (ref 30–100)
VLDLC SERPL CALC-MCNC: ABNORMAL MG/DL (ref 1–30)
WBC # BLD: 5.5 K/UL (ref 3.5–11.3)
WBC # BLD: ABNORMAL 10*3/UL

## 2020-03-09 PROCEDURE — 80061 LIPID PANEL: CPT

## 2020-03-09 PROCEDURE — 82306 VITAMIN D 25 HYDROXY: CPT

## 2020-03-09 PROCEDURE — 84443 ASSAY THYROID STIM HORMONE: CPT

## 2020-03-09 PROCEDURE — 82570 ASSAY OF URINE CREATININE: CPT

## 2020-03-09 PROCEDURE — 83036 HEMOGLOBIN GLYCOSYLATED A1C: CPT

## 2020-03-09 PROCEDURE — 82043 UR ALBUMIN QUANTITATIVE: CPT

## 2020-03-09 PROCEDURE — 80053 COMPREHEN METABOLIC PANEL: CPT

## 2020-03-09 PROCEDURE — 84439 ASSAY OF FREE THYROXINE: CPT

## 2020-03-09 PROCEDURE — 84153 ASSAY OF PSA TOTAL: CPT

## 2020-03-09 PROCEDURE — 81003 URINALYSIS AUTO W/O SCOPE: CPT

## 2020-03-09 PROCEDURE — 85025 COMPLETE CBC W/AUTO DIFF WBC: CPT

## 2020-03-09 PROCEDURE — 36415 COLL VENOUS BLD VENIPUNCTURE: CPT

## 2020-03-21 ENCOUNTER — HOSPITAL ENCOUNTER (INPATIENT)
Age: 55
LOS: 4 days | Discharge: HOME | DRG: 751 | End: 2020-03-25
Attending: EMERGENCY MEDICINE | Admitting: PSYCHIATRY & NEUROLOGY
Payer: COMMERCIAL

## 2020-03-21 PROBLEM — F33.3 DEPRESSION, MAJOR, RECURRENT, SEVERE WITH PSYCHOSIS (HCC): Status: ACTIVE | Noted: 2020-03-21

## 2020-03-21 LAB — GLUCOSE BLD-MCNC: 206 MG/DL (ref 75–110)

## 2020-03-21 PROCEDURE — 6370000000 HC RX 637 (ALT 250 FOR IP): Performed by: PSYCHIATRY & NEUROLOGY

## 2020-03-21 PROCEDURE — 82947 ASSAY GLUCOSE BLOOD QUANT: CPT

## 2020-03-21 PROCEDURE — 99285 EMERGENCY DEPT VISIT HI MDM: CPT

## 2020-03-21 PROCEDURE — 1240000000 HC EMOTIONAL WELLNESS R&B

## 2020-03-21 RX ORDER — PRAVASTATIN SODIUM 20 MG
20 TABLET ORAL DAILY
Status: ON HOLD | COMMUNITY
End: 2020-03-21

## 2020-03-21 RX ORDER — GLIMEPIRIDE 4 MG/1
4 TABLET ORAL
Status: ON HOLD | COMMUNITY
End: 2020-08-15

## 2020-03-21 RX ORDER — OXYCODONE HYDROCHLORIDE 5 MG/1
5 TABLET ORAL EVERY 6 HOURS PRN
Status: DISCONTINUED | OUTPATIENT
Start: 2020-03-21 | End: 2020-03-25 | Stop reason: HOSPADM

## 2020-03-21 RX ORDER — OXYCODONE AND ACETAMINOPHEN 10; 325 MG/1; MG/1
1 TABLET ORAL EVERY 6 HOURS PRN
Status: DISCONTINUED | OUTPATIENT
Start: 2020-03-21 | End: 2020-03-21 | Stop reason: CLARIF

## 2020-03-21 RX ORDER — HYDROXYZINE HYDROCHLORIDE 25 MG/1
25 TABLET, FILM COATED ORAL 2 TIMES DAILY PRN
Status: DISCONTINUED | OUTPATIENT
Start: 2020-03-21 | End: 2020-03-25 | Stop reason: HOSPADM

## 2020-03-21 RX ORDER — GABAPENTIN 800 MG/1
800 TABLET ORAL 3 TIMES DAILY
COMMUNITY

## 2020-03-21 RX ORDER — BUPROPION HYDROCHLORIDE 150 MG/1
150 TABLET ORAL EVERY MORNING
Status: DISCONTINUED | OUTPATIENT
Start: 2020-03-22 | End: 2020-03-25 | Stop reason: HOSPADM

## 2020-03-21 RX ORDER — CYCLOBENZAPRINE HCL 10 MG
10 TABLET ORAL 2 TIMES DAILY PRN
Status: DISCONTINUED | OUTPATIENT
Start: 2020-03-21 | End: 2020-03-25 | Stop reason: HOSPADM

## 2020-03-21 RX ORDER — GABAPENTIN 400 MG/1
800 CAPSULE ORAL 3 TIMES DAILY
Status: DISCONTINUED | OUTPATIENT
Start: 2020-03-21 | End: 2020-03-25 | Stop reason: HOSPADM

## 2020-03-21 RX ORDER — OXYCODONE HYDROCHLORIDE AND ACETAMINOPHEN 5; 325 MG/1; MG/1
1 TABLET ORAL EVERY 6 HOURS PRN
Status: DISCONTINUED | OUTPATIENT
Start: 2020-03-21 | End: 2020-03-25 | Stop reason: HOSPADM

## 2020-03-21 RX ORDER — MAGNESIUM HYDROXIDE/ALUMINUM HYDROXICE/SIMETHICONE 120; 1200; 1200 MG/30ML; MG/30ML; MG/30ML
30 SUSPENSION ORAL EVERY 6 HOURS PRN
Status: DISCONTINUED | OUTPATIENT
Start: 2020-03-21 | End: 2020-03-25 | Stop reason: HOSPADM

## 2020-03-21 RX ORDER — ACETAMINOPHEN 325 MG/1
650 TABLET ORAL EVERY 4 HOURS PRN
Status: DISCONTINUED | OUTPATIENT
Start: 2020-03-21 | End: 2020-03-25 | Stop reason: HOSPADM

## 2020-03-21 RX ORDER — AMLODIPINE BESYLATE 5 MG/1
5 TABLET ORAL DAILY
Status: DISCONTINUED | OUTPATIENT
Start: 2020-03-21 | End: 2020-03-25 | Stop reason: HOSPADM

## 2020-03-21 RX ORDER — GABAPENTIN 600 MG/1
800 TABLET ORAL 3 TIMES DAILY
Status: ON HOLD | COMMUNITY
End: 2020-03-21 | Stop reason: ALTCHOICE

## 2020-03-21 RX ORDER — TRAZODONE HYDROCHLORIDE 50 MG/1
50 TABLET ORAL NIGHTLY PRN
Status: DISCONTINUED | OUTPATIENT
Start: 2020-03-21 | End: 2020-03-25 | Stop reason: HOSPADM

## 2020-03-21 RX ORDER — CYCLOBENZAPRINE HCL 10 MG
10 TABLET ORAL 2 TIMES DAILY PRN
Status: ON HOLD | COMMUNITY
End: 2020-08-15

## 2020-03-21 RX ORDER — SERTRALINE HYDROCHLORIDE 100 MG/1
100 TABLET, FILM COATED ORAL DAILY
Status: DISCONTINUED | OUTPATIENT
Start: 2020-03-21 | End: 2020-03-25 | Stop reason: HOSPADM

## 2020-03-21 RX ORDER — LISINOPRIL 5 MG/1
5 TABLET ORAL DAILY
Status: ON HOLD | COMMUNITY
End: 2020-03-21

## 2020-03-21 RX ORDER — QUETIAPINE FUMARATE 200 MG/1
400 TABLET, FILM COATED ORAL NIGHTLY
Status: DISCONTINUED | OUTPATIENT
Start: 2020-03-21 | End: 2020-03-25 | Stop reason: HOSPADM

## 2020-03-21 RX ORDER — QUETIAPINE FUMARATE 300 MG/1
600 TABLET, FILM COATED ORAL NIGHTLY
Status: DISCONTINUED | OUTPATIENT
Start: 2020-03-21 | End: 2020-03-21

## 2020-03-21 RX ORDER — BUPROPION HYDROCHLORIDE 150 MG/1
150 TABLET ORAL EVERY MORNING
Status: ON HOLD | COMMUNITY
End: 2020-03-24 | Stop reason: HOSPADM

## 2020-03-21 RX ORDER — TRAZODONE HYDROCHLORIDE 150 MG/1
150 TABLET ORAL NIGHTLY PRN
Status: ON HOLD | COMMUNITY
End: 2021-12-10 | Stop reason: SDUPTHER

## 2020-03-21 RX ORDER — GLIMEPIRIDE 4 MG/1
4 TABLET ORAL
Status: DISCONTINUED | OUTPATIENT
Start: 2020-03-22 | End: 2020-03-25 | Stop reason: HOSPADM

## 2020-03-21 RX ORDER — AMLODIPINE BESYLATE 5 MG/1
5 TABLET ORAL DAILY
Status: ON HOLD | COMMUNITY
End: 2020-08-15

## 2020-03-21 RX ADMIN — GABAPENTIN 800 MG: 400 CAPSULE ORAL at 20:41

## 2020-03-21 RX ADMIN — SERTRALINE HYDROCHLORIDE 100 MG: 100 TABLET ORAL at 17:15

## 2020-03-21 RX ADMIN — CYCLOBENZAPRINE 10 MG: 10 TABLET, FILM COATED ORAL at 20:41

## 2020-03-21 RX ADMIN — AMLODIPINE BESYLATE 5 MG: 5 TABLET ORAL at 17:15

## 2020-03-21 RX ADMIN — QUETIAPINE FUMARATE 400 MG: 200 TABLET ORAL at 20:41

## 2020-03-21 RX ADMIN — TRAZODONE HYDROCHLORIDE 50 MG: 50 TABLET ORAL at 20:41

## 2020-03-21 RX ADMIN — METFORMIN HYDROCHLORIDE 1000 MG: 500 TABLET ORAL at 20:41

## 2020-03-21 RX ADMIN — OXYCODONE HYDROCHLORIDE 5 MG: 5 TABLET ORAL at 17:53

## 2020-03-21 RX ADMIN — GABAPENTIN 800 MG: 400 CAPSULE ORAL at 17:14

## 2020-03-21 RX ADMIN — OXYCODONE AND ACETAMINOPHEN 1 TABLET: 5; 325 TABLET ORAL at 17:53

## 2020-03-21 ASSESSMENT — PAIN DESCRIPTION - FREQUENCY: FREQUENCY: CONTINUOUS

## 2020-03-21 ASSESSMENT — PAIN DESCRIPTION - PAIN TYPE
TYPE: CHRONIC PAIN
TYPE: CHRONIC PAIN

## 2020-03-21 ASSESSMENT — PAIN DESCRIPTION - PROGRESSION: CLINICAL_PROGRESSION: NOT CHANGED

## 2020-03-21 ASSESSMENT — PAIN DESCRIPTION - ORIENTATION: ORIENTATION: LOWER

## 2020-03-21 ASSESSMENT — SLEEP AND FATIGUE QUESTIONNAIRES
AVERAGE NUMBER OF SLEEP HOURS: 0
RESTFUL SLEEP: NO
DIFFICULTY ARISING: NO
SLEEP PATTERN: DIFFICULTY FALLING ASLEEP
DIFFICULTY STAYING ASLEEP: YES
DO YOU USE A SLEEP AID: NO
DIFFICULTY FALLING ASLEEP: YES
DO YOU HAVE DIFFICULTY SLEEPING: YES

## 2020-03-21 ASSESSMENT — PAIN DESCRIPTION - LOCATION
LOCATION: BACK
LOCATION: BACK

## 2020-03-21 ASSESSMENT — PATIENT HEALTH QUESTIONNAIRE - PHQ9: SUM OF ALL RESPONSES TO PHQ QUESTIONS 1-9: 8

## 2020-03-21 ASSESSMENT — PAIN SCALES - GENERAL
PAINLEVEL_OUTOF10: 3
PAINLEVEL_OUTOF10: 6
PAINLEVEL_OUTOF10: 7
PAINLEVEL_OUTOF10: 5

## 2020-03-21 ASSESSMENT — LIFESTYLE VARIABLES: HISTORY_ALCOHOL_USE: NO

## 2020-03-21 ASSESSMENT — PAIN DESCRIPTION - ONSET: ONSET: ON-GOING

## 2020-03-21 ASSESSMENT — PAIN - FUNCTIONAL ASSESSMENT: PAIN_FUNCTIONAL_ASSESSMENT: ACTIVITIES ARE NOT PREVENTED

## 2020-03-21 ASSESSMENT — PAIN DESCRIPTION - DESCRIPTORS: DESCRIPTORS: ACHING;CONSTANT

## 2020-03-21 NOTE — ED PROVIDER NOTES
Cocaine. He reports that he does not drink alcohol. Family History: None  Psychiatric History: None    Allergies:has No Known Allergies. PHYSICAL EXAM     INITIAL VITALS: /75   Pulse 103   Temp 98.1 °F (36.7 °C) (Oral)   Resp 14   Ht 5' 5\" (1.651 m)   Wt 226 lb (102.5 kg)   SpO2 97%   BMI 37.61 kg/m²   Constitutional:  Well developed   Eyes:  Pupils equal and readily reactive to light  HENT:  Atraumatic, external ears normal, nose normal, oropharynx moist. Neck- supple. Respiratory:  Clear to auscultation bilaterally with good air exchange, no W/R/R  Cardiovascular:  RRR with normal S1 and S2  Gastrointestinal/Abdomen:  Soft, NT.  BS present. Musculoskeletal:  No edema, no tenderness, no deformities. Back:  Normal ROM. Integument:  No rash. Neurologic:  Alert & oriented x 3, no focal deficits noted   Psychiatric:   Flat affect, answering questions appropriately, complaining of voices in his head, complaining of suicidal ideation, denies any homicidal ideation, denies any drugs or alcohol use      DIAGNOSTIC RESULTS     EKG: All EKG's are interpreted by the Emergency Department Physician who either signs or Co-signs this chart in the absence of a cardiologist.  Not indicated    RADIOLOGY:   Reviewed the radiologist:  No orders to display         LABS:  Labs Reviewed   POC GLUCOSE FINGERSTICK - Abnormal; Notable for the following components:       Result Value    POC Glucose 206 (*)     All other components within normal limits       EMERGENCY DEPARTMENT COURSE:   -------------------------  Patient was medically screened, cleared to be evaluated by  and admitted to psychiatry.       Orders Placed This Encounter   Medications    amLODIPine (NORVASC) tablet 5 mg    buPROPion (WELLBUTRIN XL) extended release tablet 150 mg    cyclobenzaprine (FLEXERIL) tablet 10 mg    gabapentin (NEURONTIN) capsule 800 mg    glimepiride (AMARYL) tablet 4 mg    hydrOXYzine (ATARAX) tablet 25 mg  metFORMIN (GLUCOPHAGE) tablet 1,000 mg    DISCONTD: QUEtiapine (SEROQUEL) tablet 600 mg    sertraline (ZOLOFT) tablet 100 mg    traZODone (DESYREL) tablet 50 mg    QUEtiapine (SEROQUEL) tablet 400 mg    acetaminophen (TYLENOL) tablet 650 mg    aluminum & magnesium hydroxide-simethicone (MAALOX) 200-200-20 MG/5ML suspension 30 mL    magnesium hydroxide (MILK OF MAGNESIA) 400 MG/5ML suspension 30 mL    DISCONTD: oxyCODONE-acetaminophen (PERCOCET)  MG per tablet 1 tablet    AND Linked Order Group     oxyCODONE-acetaminophen (PERCOCET) 5-325 MG per tablet 1 tablet     oxyCODONE (ROXICODONE) immediate release tablet 5 mg    nicotine polacrilex (NICORETTE) gum 2 mg       CONSULTS:  IP CONSULT TO HISTORY AND PHYSICAL      FINAL IMPRESSION    No diagnosis found.       DISPOSITION/PLAN:  DISPOSITION          PATIENT REFERRED TO:  Samantha Bailey, KRISTINA - CNP  [id number]   [phone]            DISCHARGE MEDICATIONS:  Current Discharge Medication List          (Please note that portions of this note were completed with a voice recognition program.  Efforts were made to edit the dictations but occasionally words are mis-transcribed.)    Brent Angel, [address] [place] [address]  03/21/20 5401

## 2020-03-21 NOTE — ED PROVIDER NOTES
[address] [place] [address]     Pt Name: Dominik Damon  MRN: [id number]  [place] 1965  Date of evaluation: 3/21/20     Dominik Damon is a 47 y.o. male with CC: Suicidal      Based on the medical record the care appears appropriate. I was personally available for consultation in the Emergency Department.     Anna Marie Mancini DO  Attending Emergency Physician          Anna Marie Mancini,   03/21/20 [address] [place] [address],   03/21/20 5633

## 2020-03-21 NOTE — PLAN OF CARE
Patient given tobacco quitline number 24557826217 at this time, refusing to call at this time, states \" I just dont want to quit now\"- patient given information as to the dangers of long term tobacco use. Continue to reinforce the importance of tobacco cessation.

## 2020-03-21 NOTE — BH NOTE
Pt was approached to complete RT assessment but pt was sleeping soundly in bed in room and did not answer on approach.  RT will seek out pt to complete assessment during next shift on 3/22/2020

## 2020-03-21 NOTE — BH NOTE
[facility]  Admission Note     Admission Type:   Admission Type: Voluntary    Reason for admission:  Reason for Admission: voices to harm self    PATIENT STRENGTHS:  Strengths: Connection to output provider, No significant Physical Illness    Patient Strengths and Limitations:  Limitations: General negative or hopeless attitude about future/recovery    Addictive Behavior:   Addictive Behavior  In the past 3 months, have you felt or has someone told you that you have a problem with:  : None  Do you have a history of Chemical Use?: No  Do you have a history of Alcohol Use?: No  Do you have a history of Street Drug Abuse?: No  Histroy of Prescripton Drug Abuse?: No    Medical Problems:   Past Medical History:   Diagnosis Date    Acute depression 9/8/2018    Back pain     DM2 (diabetes mellitus, type 2) ([facility] 4/21/2014    Generalized OA     GERD (gastroesophageal reflux disease)     HTN (hypertension)     Schizoaffective disorder, bipolar type ([facility] 3/29/2019    Unspecified sleep apnea     c  pap       Status EXAM:  Status and Exam  Normal: No  Facial Expression: Avoids Gaze, Flat  Affect: Blunt  Level of Consciousness: Alert  Mood:Normal: No  Mood: Depressed, Anxious, Sad  Motor Activity:Normal: Yes  Interview Behavior: Cooperative  Preception: [place] to Person, [place] to Time, [place] to Place, [place] to Situation  Attention:Normal: No  Attention: Distractible  Thought Processes: Blocking  Thought Content:Normal: No  Thought Content: Poverty of Content, Preoccupations  Hallucinations: Auditory (Comment), Command(Comment)(command to harm self)  Delusions: No  Memory:Normal: Yes  Insight and Judgment: No  Insight and Judgment: Poor Judgment, Poor Insight, Unmotivated  Present Suicidal Ideation: No  Present Homicidal Ideation: No    Tobacco Screening:  Practical Counseling, on admission, billie X, if applicable and completed (first 3 are required if patient doesn't refuse):             ( ) number:  [id number]. Patient's home medications were verified with [address] [place] [address]. Patient oriented to surroundings and program expectations and copy of patient rights given. Received admission packet: complete. Consents reviewed, signed by patient and staff. Patient verbalize understanding: of unit rules and expectations. Patient education on precautions: safety    Patient admitted to [facility] room [facility] from [facility] due to voices telling him to stab self. Patient stated he has been off of his medicine for one week due to not being able to find it and the voices have gotten worse in the past two day getting to the point where he no longer feels safe. Patient also stated he has been unable to sleep for the past three days and his appetite is poor. Patient is cooperative with admission process, patient safety maintained. Patient denies suicidal thoughts upon admit due to feeling safe on the unit.                       Valentino Chaco, RN

## 2020-03-21 NOTE — ED NOTES
Mode of arrival (squad #, walk in, police, etc) : walk in         Chief complaint(s): suicidal        Arrival Note (brief scenario, treatment PTA, etc). : pt states he hears voices and this past week they have gotten worse. Pt states the voices are telling him to stop taking his home medications and to stab himself in the neck. Pt has never attempted suicide in the past. Pt states he gets help prior to attempting. Pt states he hasn't slept in the past 3 days. Pt states he lives alone and goes to [place] ever 3 months. Pt states he hasn't been to [place] for about 1.5 months. Pt denies any alcohol or drug use currently. Pt is alert and oriented x4.         C= \"Have you ever felt that you should Cut down on your drinking? \"  No  A= \"Have people Annoyed you by criticizing your drinking? \"  No  G= \"Have you ever felt bad or Guilty about your drinking? \"  No  E= \"Have you ever had a drink as an Eye-opener first thing in the morning to steady your nerves or to help a hangover? \"  No    Deferred []      Reason for deferring: N/A    *If yes to two or more: probable alcohol abuse. Maggy Garcia RN  03/21/20 3994

## 2020-03-22 LAB
GLUCOSE BLD-MCNC: 137 MG/DL (ref 75–110)
GLUCOSE BLD-MCNC: 235 MG/DL (ref 75–110)

## 2020-03-22 PROCEDURE — 82947 ASSAY GLUCOSE BLOOD QUANT: CPT

## 2020-03-22 PROCEDURE — 6370000000 HC RX 637 (ALT 250 FOR IP): Performed by: PSYCHIATRY & NEUROLOGY

## 2020-03-22 PROCEDURE — 1240000000 HC EMOTIONAL WELLNESS R&B

## 2020-03-22 RX ADMIN — METFORMIN HYDROCHLORIDE 1000 MG: 500 TABLET ORAL at 16:15

## 2020-03-22 RX ADMIN — OXYCODONE HYDROCHLORIDE 5 MG: 5 TABLET ORAL at 16:04

## 2020-03-22 RX ADMIN — OXYCODONE HYDROCHLORIDE 5 MG: 5 TABLET ORAL at 02:00

## 2020-03-22 RX ADMIN — OXYCODONE AND ACETAMINOPHEN 1 TABLET: 5; 325 TABLET ORAL at 22:01

## 2020-03-22 RX ADMIN — OXYCODONE AND ACETAMINOPHEN 1 TABLET: 5; 325 TABLET ORAL at 16:04

## 2020-03-22 RX ADMIN — GLIMEPIRIDE 4 MG: 4 TABLET ORAL at 08:15

## 2020-03-22 RX ADMIN — OXYCODONE AND ACETAMINOPHEN 1 TABLET: 5; 325 TABLET ORAL at 02:00

## 2020-03-22 RX ADMIN — GABAPENTIN 800 MG: 400 CAPSULE ORAL at 08:15

## 2020-03-22 RX ADMIN — QUETIAPINE FUMARATE 400 MG: 200 TABLET ORAL at 21:26

## 2020-03-22 RX ADMIN — GABAPENTIN 800 MG: 400 CAPSULE ORAL at 15:09

## 2020-03-22 RX ADMIN — METFORMIN HYDROCHLORIDE 1000 MG: 500 TABLET ORAL at 08:15

## 2020-03-22 RX ADMIN — AMLODIPINE BESYLATE 5 MG: 5 TABLET ORAL at 08:15

## 2020-03-22 RX ADMIN — BUPROPION HYDROCHLORIDE 150 MG: 150 TABLET, FILM COATED, EXTENDED RELEASE ORAL at 08:15

## 2020-03-22 RX ADMIN — SERTRALINE HYDROCHLORIDE 100 MG: 100 TABLET ORAL at 08:15

## 2020-03-22 RX ADMIN — TRAZODONE HYDROCHLORIDE 50 MG: 50 TABLET ORAL at 21:26

## 2020-03-22 RX ADMIN — GABAPENTIN 800 MG: 400 CAPSULE ORAL at 21:26

## 2020-03-22 RX ADMIN — OXYCODONE AND ACETAMINOPHEN 1 TABLET: 5; 325 TABLET ORAL at 09:12

## 2020-03-22 RX ADMIN — OXYCODONE HYDROCHLORIDE 5 MG: 5 TABLET ORAL at 22:02

## 2020-03-22 RX ADMIN — OXYCODONE HYDROCHLORIDE 5 MG: 5 TABLET ORAL at 09:12

## 2020-03-22 ASSESSMENT — PAIN DESCRIPTION - PAIN TYPE
TYPE: CHRONIC PAIN
TYPE: ACUTE PAIN
TYPE: CHRONIC PAIN

## 2020-03-22 ASSESSMENT — PAIN DESCRIPTION - DESCRIPTORS
DESCRIPTORS: ACHING

## 2020-03-22 ASSESSMENT — PAIN DESCRIPTION - ONSET
ONSET: GRADUAL

## 2020-03-22 ASSESSMENT — PAIN DESCRIPTION - LOCATION
LOCATION: BACK

## 2020-03-22 ASSESSMENT — SLEEP AND FATIGUE QUESTIONNAIRES
DIFFICULTY FALLING ASLEEP: YES
DO YOU USE A SLEEP AID: NO
AVERAGE NUMBER OF SLEEP HOURS: 4
DO YOU HAVE DIFFICULTY SLEEPING: YES
DIFFICULTY STAYING ASLEEP: YES
SLEEP PATTERN: DIFFICULTY FALLING ASLEEP;RESTLESSNESS
DIFFICULTY ARISING: NO
RESTFUL SLEEP: NO

## 2020-03-22 ASSESSMENT — PAIN - FUNCTIONAL ASSESSMENT
PAIN_FUNCTIONAL_ASSESSMENT: ACTIVITIES ARE NOT PREVENTED

## 2020-03-22 ASSESSMENT — PAIN SCALES - GENERAL
PAINLEVEL_OUTOF10: 0
PAINLEVEL_OUTOF10: 0
PAINLEVEL_OUTOF10: 6
PAINLEVEL_OUTOF10: 0
PAINLEVEL_OUTOF10: 7
PAINLEVEL_OUTOF10: 5
PAINLEVEL_OUTOF10: 0
PAINLEVEL_OUTOF10: 6

## 2020-03-22 ASSESSMENT — PAIN DESCRIPTION - FREQUENCY
FREQUENCY: CONTINUOUS

## 2020-03-22 ASSESSMENT — PAIN DESCRIPTION - ORIENTATION
ORIENTATION: LOWER

## 2020-03-22 ASSESSMENT — PAIN DESCRIPTION - PROGRESSION
CLINICAL_PROGRESSION: GRADUALLY IMPROVING
CLINICAL_PROGRESSION: GRADUALLY IMPROVING
CLINICAL_PROGRESSION: GRADUALLY WORSENING
CLINICAL_PROGRESSION: GRADUALLY WORSENING

## 2020-03-22 ASSESSMENT — LIFESTYLE VARIABLES: HISTORY_ALCOHOL_USE: NO

## 2020-03-22 NOTE — PLAN OF CARE
medication compliant, and received meds per drs orders. Safety checks are maintained every 15 minutes, irregular intervals, and shift change. Problem: Tobacco Use:  Goal: Inpatient tobacco use cessation counseling participation  Description: Inpatient tobacco use cessation counseling participation  3/22/2020 1759 by Arelis Orr RN  Outcome: Ongoing  Note: Patient given tobacco quitline number 98023233902 at this time, refusing to call at this time, states \" I just dont want to quit now\"- patient given information as to the dangers of long term tobacco use. Continue to reinforce the importance of tobacco cessation. 3/22/2020 0933 by LEXIE Araujo  Outcome: Ongoing     Problem: Pain:  Goal: Pain level will decrease  Description: Pain level will decrease  3/22/2020 1759 by Arelis Orr RN  Outcome: Ongoing  Note: Patient is complaining of chronic lower back pain. 3/22/2020 0933 by LEXIE Araujo  Outcome: Ongoing  Goal: Control of acute pain  Description: Control of acute pain  3/22/2020 1759 by Arelis Orr RN  Outcome: Ongoing  Note: Patient has 10/325 of oxycodone/acetaminophen prescribed for his chronic back pain. 3/22/2020 0933 by LEXIE Araujo  Outcome: Ongoing  Goal: Control of chronic pain  Description: Control of chronic pain  3/22/2020 1759 by Arelis Orr RN  Outcome: Ongoing  Note: Patient is receiving his prescribed pain medication per MD orders. 3/22/2020 0933 by LEXIE Araujo  Outcome: Ongoing     Problem: Tobacco Use:  Intervention: Tobacco-use cessation counseling  Note: Patient is currently not requesting any medication for smoking cessation. Problem: Pain:  Intervention: Opioid analgesia side-effects  Note: Patient is able to rest and relax after taking his prescribed pain medication. Intervention: Assess barriers to pain control  Note: Patient currently has no barriers to pain control.   Intervention: Promote participation in pain management plan  Note: Patient is an active participant in managing his pain.

## 2020-03-22 NOTE — PLAN OF CARE
for care    Method:group therapy, medication compliance, individualized assessments and care planning    Outcome: needs reinforcement    PATIENT GOALS: to be discussed with patient within 72 hours    PLAN/TREATMENT RECOMMENDATIONS:     continue group therapy , medications compliance, goal setting, individualized assessments and care, continue to monitor pt on unit      SHORT-TERM GOALS:   Time frame for Short-Term Goals: 5-7 days    LONG-TERM GOALS:  Time frame for Long-Term Goals: 6 months  Members Present in Team Meeting: See Signature Sheet    Marie Stewart

## 2020-03-22 NOTE — PLAN OF CARE
Problem: Altered Mood, Depressive Behavior:  Goal: Able to verbalize acceptance of life and situations over which he or she has no control  Description: Able to verbalize acceptance of life and situations over which he or she has no control  Outcome: Ongoing     Problem: Altered Mood, Depressive Behavior:  Goal: Able to verbalize and/or display a decrease in depressive symptoms  Description: Able to verbalize and/or display a decrease in depressive symptoms  Outcome: Ongoing   Patient states they are not having thoughts of self harm at this time and verbally agrees to seek staff if feelings of harming self arise. Patient behavior controlled and accepting of medications,flat,denies audio hallucination and visual hallucinations patient eating and sleeping well. Staff will continue to monitor for safety and offer support as needed.

## 2020-03-22 NOTE — GROUP NOTE
Group Therapy Note    Date: 3/22/2020    Group Start Time: 0900  Group End Time: 8434  Group Topic: Community Meeting    [facility] KAYLYNN Schneider, [address]        Group Therapy Note    Attendees: 6/19         Pt did not participate in Community Meeting/Goals Group at 900am when encouraged by RT due to resting in room. Pt was offered talk time as an alternative to group but declined.         Discipline Responsible: Psychoeducational Specialist        Signature:  Eladio Sifuentes

## 2020-03-22 NOTE — GROUP NOTE
Group Therapy Note    Date: 3/22/2020    Group Start Time: 1100  Group End Time: 2920  Group Topic: Cognitive Skills    INES Gregory        Group Therapy Note    Attendees: 9/19      patient refused to attend cognitive skills group at 1100 after encouragement from staff. 1:1 talk time provided as alternative to group session.     Signature:  Dee Dee Gregory

## 2020-03-23 LAB
GLUCOSE BLD-MCNC: 129 MG/DL (ref 75–110)
GLUCOSE BLD-MCNC: 177 MG/DL (ref 75–110)
GLUCOSE BLD-MCNC: 182 MG/DL (ref 75–110)
GLUCOSE BLD-MCNC: 220 MG/DL (ref 75–110)

## 2020-03-23 PROCEDURE — 6370000000 HC RX 637 (ALT 250 FOR IP): Performed by: PSYCHIATRY & NEUROLOGY

## 2020-03-23 PROCEDURE — 1240000000 HC EMOTIONAL WELLNESS R&B

## 2020-03-23 PROCEDURE — 99222 1ST HOSP IP/OBS MODERATE 55: CPT | Performed by: INTERNAL MEDICINE

## 2020-03-23 PROCEDURE — 82947 ASSAY GLUCOSE BLOOD QUANT: CPT

## 2020-03-23 RX ADMIN — OXYCODONE AND ACETAMINOPHEN 1 TABLET: 5; 325 TABLET ORAL at 06:17

## 2020-03-23 RX ADMIN — METFORMIN HYDROCHLORIDE 1000 MG: 500 TABLET ORAL at 07:54

## 2020-03-23 RX ADMIN — GABAPENTIN 800 MG: 400 CAPSULE ORAL at 15:46

## 2020-03-23 RX ADMIN — BUPROPION HYDROCHLORIDE 150 MG: 150 TABLET, FILM COATED, EXTENDED RELEASE ORAL at 07:54

## 2020-03-23 RX ADMIN — OXYCODONE HYDROCHLORIDE 5 MG: 5 TABLET ORAL at 06:17

## 2020-03-23 RX ADMIN — METFORMIN HYDROCHLORIDE 1000 MG: 500 TABLET ORAL at 16:39

## 2020-03-23 RX ADMIN — OXYCODONE AND ACETAMINOPHEN 1 TABLET: 5; 325 TABLET ORAL at 19:16

## 2020-03-23 RX ADMIN — GLIMEPIRIDE 4 MG: 4 TABLET ORAL at 08:14

## 2020-03-23 RX ADMIN — QUETIAPINE FUMARATE 400 MG: 200 TABLET ORAL at 20:24

## 2020-03-23 RX ADMIN — OXYCODONE AND ACETAMINOPHEN 1 TABLET: 5; 325 TABLET ORAL at 12:31

## 2020-03-23 RX ADMIN — GABAPENTIN 800 MG: 400 CAPSULE ORAL at 20:25

## 2020-03-23 RX ADMIN — TRAZODONE HYDROCHLORIDE 50 MG: 50 TABLET ORAL at 20:24

## 2020-03-23 RX ADMIN — SERTRALINE HYDROCHLORIDE 100 MG: 100 TABLET ORAL at 07:54

## 2020-03-23 RX ADMIN — AMLODIPINE BESYLATE 5 MG: 5 TABLET ORAL at 07:54

## 2020-03-23 RX ADMIN — OXYCODONE HYDROCHLORIDE 5 MG: 5 TABLET ORAL at 12:31

## 2020-03-23 RX ADMIN — OXYCODONE HYDROCHLORIDE 5 MG: 5 TABLET ORAL at 19:16

## 2020-03-23 RX ADMIN — GABAPENTIN 800 MG: 400 CAPSULE ORAL at 07:54

## 2020-03-23 ASSESSMENT — PAIN DESCRIPTION - FREQUENCY
FREQUENCY: CONTINUOUS

## 2020-03-23 ASSESSMENT — PAIN - FUNCTIONAL ASSESSMENT
PAIN_FUNCTIONAL_ASSESSMENT: ACTIVITIES ARE NOT PREVENTED
PAIN_FUNCTIONAL_ASSESSMENT: 0-10
PAIN_FUNCTIONAL_ASSESSMENT: ACTIVITIES ARE NOT PREVENTED
PAIN_FUNCTIONAL_ASSESSMENT: 0-10

## 2020-03-23 ASSESSMENT — PAIN DESCRIPTION - ONSET
ONSET: ON-GOING

## 2020-03-23 ASSESSMENT — PAIN SCALES - GENERAL
PAINLEVEL_OUTOF10: 0
PAINLEVEL_OUTOF10: 6
PAINLEVEL_OUTOF10: 6
PAINLEVEL_OUTOF10: 0
PAINLEVEL_OUTOF10: 7
PAINLEVEL_OUTOF10: 7

## 2020-03-23 ASSESSMENT — ENCOUNTER SYMPTOMS
SHORTNESS OF BREATH: 0
VOMITING: 0
DIARRHEA: 0
COUGH: 0
ABDOMINAL PAIN: 0
WHEEZING: 0
CHEST TIGHTNESS: 0
SINUS PAIN: 0
CONSTIPATION: 0
TROUBLE SWALLOWING: 0
NAUSEA: 0
BACK PAIN: 0
SINUS PRESSURE: 0

## 2020-03-23 ASSESSMENT — PAIN DESCRIPTION - PROGRESSION
CLINICAL_PROGRESSION: GRADUALLY WORSENING
CLINICAL_PROGRESSION: GRADUALLY IMPROVING
CLINICAL_PROGRESSION: GRADUALLY WORSENING
CLINICAL_PROGRESSION: GRADUALLY IMPROVING

## 2020-03-23 ASSESSMENT — PAIN DESCRIPTION - LOCATION
LOCATION: BACK

## 2020-03-23 ASSESSMENT — PAIN DESCRIPTION - PAIN TYPE
TYPE: CHRONIC PAIN

## 2020-03-23 ASSESSMENT — PAIN DESCRIPTION - ORIENTATION
ORIENTATION: LOWER

## 2020-03-23 ASSESSMENT — PAIN DESCRIPTION - DESCRIPTORS
DESCRIPTORS: ACHING

## 2020-03-23 NOTE — GROUP NOTE
Group Therapy Note    Date: 3/23/2020    Group Start Time: 0900  Group End Time: 1037  Group Topic: Community Meeting    INES Velázquez Hammed Day    patient refused to attend community meeting group at 0900 after encouragement from staff.   1:1 talk time provided as alternative to group session       Signature:  Marifer Meier

## 2020-03-23 NOTE — GROUP NOTE
Group Therapy Note    Date: 3/23/2020    Group Start Time: 1010  Group End Time: 7968  Group Topic: Cognitive Skills    IN[facility] GARCIA A    Lelo Ram    Patient refused to attend leisure/ cognitive skills group at 1010 after encouragement from staff. 1:1 talk time provided by staff.       Signature:  Lelo Ram

## 2020-03-23 NOTE — H&P
HISTORY and [facility] [address] [address]       NAME:  Michele Varela  MRN: [id number]   YOB: 1965   Date: 3/23/2020   Age: 47 y.o. Gender: male     COMPLAINT AND PRESENT HISTORY:      Michele Varela is 47 y.o.,  male, admitted because of depression with voices that told him to harm himself. Pt reportedly told ED staff the voices told him to stop taking his medications. Pt reports tactile hallucinations and feels like \"there are bugs crawling on my left thigh. \" Per ED notes, pt was denying HI. No visual hallucinations. Poor insight. Pt reports poor sleep, loss of appetite, poor concentration and memory. Patient denies any current alcohol or substance abuse to examiner. He has hx of cocaine and use of marijuana. Patient lives alone. Pt has been non compliant with his medications 1 week. Internal medicine consulted for management of DM2. Pt states he is compliant with his medications. Reports his home blood glucose levels run 160s-170s. No chest pain or SOB. No fever or chills. No other somatic complaints. See psychiatric admission note for further psychiatric history.      DIAGNOSTIC RESULTS     PAST MEDICAL HISTORY     Past Medical History:   Diagnosis Date    Acute depression 9/8/2018    Back pain     DM2 (diabetes mellitus, type 2) ([facility] 4/21/2014    Generalized OA     GERD (gastroesophageal reflux disease)     HTN (hypertension)     [facility]ffective disorder, [facility] type ([facility] 3/29/2019    Unspecified sleep apnea     c  pap       SURGICAL HISTORY       Past Surgical History:   Procedure Laterality Date    TONSILLECTOMY AND ADENOIDECTOMY         FAMILY HISTORY       Family History   Problem Relation Age of Onset    Diabetes Mother        SOCIAL HISTORY       Social History     Socioeconomic History    Marital status: Single     Spouse name: None    Number of children: None    Years of education: None    Highest education level: None   Occupational History  None   Social Needs    Financial resource strain: None    Food insecurity     Worry: None     Inability: None    Transportation needs     Medical: None     Non-medical: None   Tobacco Use    Smoking status: Current Some Day Smoker     Packs/day: 0.50     Types: Cigarettes    Smokeless tobacco: Never Used   Substance and Sexual Activity    Alcohol use: No    Drug use: Yes     Types: Marijuana, Cocaine     Comment: stopped in 2000; Pt reports crack/cocaine use on an every other day basis    Sexual activity: None   Lifestyle    Physical activity     Days per week: None     Minutes per session: None    Stress: None   Relationships    Social connections     Talks on phone: None     Gets together: None     Attends [employer] service: None     Active member of club or organization: None     Attends meetings of clubs or organizations: None     Relationship status: None    Intimate partner violence     Fear of current or ex partner: None     Emotionally abused: None     Physically abused: None     Forced sexual activity: None   Other Topics Concern    None   Social History Narrative    None           REVIEW OF SYSTEMS      No Known Allergies    No current facility-administered medications on file prior to encounter. Current Outpatient Medications on File Prior to Encounter   Medication Sig Dispense Refill    traZODone (DESYREL) 50 MG tablet Take 50 mg by mouth nightly as needed for Sleep      buPROPion (WELLBUTRIN XL) 150 MG extended release tablet Take 150 mg by mouth every morning      glimepiride (AMARYL) 4 MG tablet Take 4 mg by mouth every morning (before breakfast)      cyclobenzaprine (FLEXERIL) 10 MG tablet Take 10 mg by mouth 2 times daily as needed for Muscle spasms      amLODIPine (NORVASC) 5 MG tablet Take 5 mg by mouth daily      gabapentin (NEURONTIN) 400 MG capsule Take 800 mg by mouth 3 times daily.       hydrocortisone 2.5 % cream Apply 1 Dose topically 2 times daily as needed Apply topically 2 times daily.  pantoprazole (PROTONIX) 40 MG tablet Take 1 tablet by mouth every morning (before breakfast) 30 tablet 3    hydrOXYzine (ATARAX) 25 MG tablet Take 25 mg by mouth 2 times daily as needed for Anxiety      sertraline (ZOLOFT) 100 MG tablet Take 1 tablet by mouth daily 30 tablet 0    metFORMIN (GLUCOPHAGE) 1000 MG tablet Take 1 tablet by mouth 2 times daily 60 tablet 0    QUEtiapine (SEROQUEL) 300 MG tablet Take 2 tablets by mouth nightly 60 tablet 0    oxyCODONE-acetaminophen (PERCOCET)  MG per tablet Take 1 tablet by mouth every 6 hours as needed for Pain. Prescribed by pain management Dr. Aundrea Mae, 120 tabs given on 2 /26/2020          Review of Systems   Constitutional: Negative for chills and fever. HENT: Negative for congestion, postnasal drip, sinus pressure, sinus pain and trouble swallowing. Respiratory: Negative for cough, chest tightness, shortness of breath and wheezing. Cardiovascular: Negative for chest pain and palpitations. Gastrointestinal: Negative for abdominal pain, constipation, diarrhea, nausea and vomiting. Genitourinary: Negative for difficulty urinating, frequency and urgency. Musculoskeletal: Negative for arthralgias, back pain and myalgias. Skin: Negative for rash. Neurological: Negative for dizziness, weakness, light-headedness and numbness. Psychiatric/Behavioral: Positive for decreased concentration, dysphoric mood, hallucinations, sleep disturbance and suicidal ideas. Negative for agitation. The patient is nervous/anxious. GENERAL PHYSICAL EXAM:     Vitals: /85   Pulse 94   Temp 97.9 °F (36.6 °C) (Oral)   Resp 14   Ht 5' 5\" (1.651 m)   Wt 226 lb (102.5 kg)   SpO2 97%   BMI 37.61 kg/m²  Body mass index is 37.61 kg/m². Pt was examined with a nurse present in the room. GENERAL APPEARANCE:  Marilu Piña is 47 y.o.,  male, mildly obese, nourished, conscious, alert.   Does not appear to be distress or pain at this time. SKIN:  Warm, dry, no cyanosis or jaundice. HEAD:  Normocephalic, atraumatic, no swelling or tenderness. EYES:  Pupils equal, reactive to light, Conjunctiva is clear, EOMs intact conner. eyelids WNL. EARS:  No discharge, no marked hearing loss. NOSE:  No rhinorrhea, epistaxis or septal deformity. THROAT:  Uvula midline. No ulceration bleeding or discharge. NECK:  No stiffness, trachea central.    CHEST:  Symmetrical and equal on expansion. HEART:  Regular rate and rhythm. S1 > S2, No audible murmurs or gallops. LUNGS:  Equal on expansion, coarse breath sounds throughout clearing with cough. No wheezing. ABDOMEN:  Obese. Soft on palpation. No localized tenderness. No guarding or rigidity. No palpable organomegaly. LYMPHATICS:  No palpable anterior cervical lymphadenopathy. LOCOMOTOR, BACK AND SPINE:  No tenderness or deformities. EXTREMITIES:  Symmetrical, trace pedal edema. No calf tenderness. No discoloration or ulcerations. NEUROLOGIC:  The patient is conscious, alert, oriented, Gait and balance WNL. No apparent focal sensory deficits. No motor deficits, muscle strength equal Conner. No facial droop, tongue protrudes centrally, no slurring of the speech. Cranial nerves 3-12 reveal no deficits, taste and smell not assessed. PROVISIONAL DIAGNOSES:      Principal Problem:    Depression, major, recurrent, severe with psychosis ([facility]  Resolved Problems:    * No resolved hospital problems.  KRISTINA Peters - CNP on 3/23/2020 at 10:41 AM

## 2020-03-23 NOTE — GROUP NOTE
Group Therapy Note    Date: 3/23/2020    Group Start Time: 1430  Group End Time: 1754  Group Topic: Psychoeducation    INES Ramires    Patient refused to attend cognitive/ coping skills group at 1430 after encouragement from staff. 1:1 talk time provided by staff.        Signature:  Car Ramires

## 2020-03-23 NOTE — GROUP NOTE
Group Therapy Note    Date: 3/23/2020    Group Start Time: 1100  Group End Time: 5309  Group Topic: Psychotherapy    [facility]    NATASHA Cano, FRED        Group Therapy Note    Attendees: 5/9    patient refused to attend psychotherapy group at 1100 after encouragement from staff.   1:1 talk time provided as alternative to group session      Signature:  NATASHA Cano, FRED

## 2020-03-23 NOTE — PLAN OF CARE
given information as to the dangers of long term tobacco use. Continue to reinforce the importance of tobacco cessation. Problem: Pain:  Goal: Pain level will decrease  Description: Pain level will decrease  3/22/2020 2205 by Peter Negrete RN  Outcome: Ongoing  Note: Patient is complaining of chronic lower back pain  Goal: Control of acute pain  Description: Control of acute pain  Note: Patient has 10/325 of oxycodone/acetaminophen prescribed for his back pain. Outcome: Ongoing  Goal: Control of chronic pain  Description: Control of chronic pain  Note: Patient is receiving his prescribed pain medication per MD orders.

## 2020-03-23 NOTE — CONSULTS
[facility] 52 Internal Medicine    CONSULTATION / HISTORY AND PHYSICAL EXAMINATION            Date:   3/23/2020  Patient name:  Michele Varela  Date of admission:  3/21/2020 10:36 AM  MRN:   [id number]  Account:  [de-identified]  YOB: 1965  PCP:    KRISTINA Goff CNP  Room:   [address] [place] [address]-  Code Status:    Prior    Physician Requesting Consult: Fred Ferrer MD    Reason for Consult:  Med mx    Chief Complaint:     Chief Complaint   Patient presents with    Suicidal       History Obtained From:     Pt medical record and nursing staf    History of Present Illness:     Diabetes   Duration more than 7 years  Modifying factors on Glucophage and other med  Severity uncontrolled sever  Associated signs and symtoms neuropathy/ckd/ CAD. aggravated with sugar diet and better with low sugar diet           Past Medical History:     Past Medical History:   Diagnosis Date    Acute depression 9/8/2018    Back pain     DM2 (diabetes mellitus, type 2) ([facility] 4/21/2014    Generalized OA     GERD (gastroesophageal reflux disease)     HTN (hypertension)     [facility]ive [facility] 3/29/2019    Unspecified sleep apnea     c  pap        Past Surgical History:     Past Surgical History:   Procedure Laterality Date    TONSILLECTOMY AND ADENOIDECTOMY          Medications Prior to Admission:     Prior to Admission medications    Medication Sig Start Date End Date Taking?  Authorizing Provider   traZODone (DESYREL) 50 MG tablet Take 50 mg by mouth nightly as needed for Sleep   Yes Historical Provider, MD   buPROPion (WELLBUTRIN XL) 150 MG extended release tablet Take 150 mg by mouth every morning   Yes Historical Provider, MD   glimepiride (AMARYL) 4 MG tablet Take 4 mg by mouth every morning (before breakfast)   Yes Historical Provider, MD   cyclobenzaprine (FLEXERIL) 10 MG tablet Take 10 mg by mouth 2 times daily as needed for Muscle spasms   Yes Historical diarrhea, constipation, change in bowel habits, abdominal pain   GENITOURINARY:  negative for difficulty of urination, burning with urination, frequency   INTEGUMENT:  negative for rash, skin lesions, easy bruising   HEMATOLOGIC/LYMPHATIC:  negative for swelling/edema   ALLERGIC/IMMUNOLOGIC:  negative for urticaria , itching  ENDOCRINE:  negative increase in drinking, increase in urination, hot or cold intolerance  MUSCULOSKELETAL:  negative joint pains, muscle aches, swelling of joints  NEUROLOGICAL:  negative for headaches, dizziness, lightheadedness, numbness, pain, tingling extremities      Physical Exam:     /85   Pulse 94   Temp 97.9 °F (36.6 °C) (Oral)   Resp 14   Ht 5' 5\" (1.651 m)   Wt 226 lb (102.5 kg)   SpO2 97%   BMI 37.61 kg/m²   Temp (24hrs), Av °F (36.7 °C), Min:97.9 °F (36.6 °C), Max:98.1 °F (36.7 °C)    Recent Labs     20  1609 20  0730 20  1148 20  1637   POCGLU 137* 177* 129* 220*     No intake or output data in the 24 hours ending 20 1702    General Appearance:  alert, well appearing, and in no acute distress  Mental status: oriented to person, place, and time with normal affect  Head:  normocephalic, atraumatic. Eye: no icterus, redness, pupils equal and reactive, extraocular eye movements intact, conjunctiva clear  Ear: normal external ear, no discharge, hearing intact  Nose:  no drainage noted  Mouth: mucous membranes moist  Neck: supple, no carotid bruits, thyroid not palpable  Lungs: Bilateral equal air entry, clear to ausculation, no wheezing, rales or rhonchi, normal effort  Cardiovascular: normal rate, regular rhythm, no murmur, gallop, rub.   Abdomen: Soft, nontender, nondistended, normal bowel sounds, no hepatomegaly or splenomegaly  Neurologic: There are no new focal motor or sensory deficits, normal muscle tone and bulk, no abnormal sensation, normal speech, cranial nerves II through XII grossly intact  Skin: No gross lesions, rashes, bruising or bleeding on exposed skin area  Extremities:  peripheral pulses palpable, no pedal edema or calf pain with palpation      Investigations:      Laboratory Testing:  Recent Results (from the past 24 hour(s))   POC Glucose Fingerstick    Collection Time: 03/23/20  7:30 AM   Result Value Ref Range    POC Glucose 177 (H) 75 - 110 mg/dL   POC Glucose Fingerstick    Collection Time: 03/23/20 11:48 AM   Result Value Ref Range    POC Glucose 129 (H) 75 - 110 mg/dL   POC Glucose Fingerstick    Collection Time: 03/23/20  4:37 PM   Result Value Ref Range    POC Glucose 220 (H) 75 - 110 mg/dL       Imaging/Diagonstics:      Assessment :      Primary Problem  Depression, major, recurrent, severe with psychosis ([facility]    Active Hospital Problems    Diagnosis Date Noted    Depression, major, recurrent, severe [facility] [F33.3] 03/21/2020    DM2 (diabetes mellitus, type 2) ([facility] [E11.9] 04/21/2014    GERD (gastroesophageal reflux disease) [K21.9] 07/12/2012       Plan:     1. Dm  2. meds rev  3. ivan follow  4. Diabetic diet  5. Consultations:   IP CONSULT TO HISTORY AND PHYSICAL  IP CONSULT TO INTERNAL MEDICINE      Andrea Bedolla MD  3/23/2020  5:02 PM    Copy sent to KRISTINA Harding - CNP    Please note that this chart was generated using voice recognition Dragon dictation software. Although every effort was made to ensure the accuracy of this automated transcription, some errors in transcription may have occurred.

## 2020-03-23 NOTE — PLAN OF CARE
Problem: Altered Mood, Depressive Behavior:  Goal: Able to verbalize and/or display a decrease in depressive symptoms  Description: Able to verbalize and/or display a decrease in depressive symptoms  3/23/2020 1506 by Serena Lake  Outcome: Ongoing   Patient admits to feelings of depression and rates the severity 5/10. Writer offered support and patient accepted. Will continue to monitor and provide support as needed. Q15 minute checks for safety. Problem: Altered Mood, Depressive Behavior:  Goal: Ability to disclose and discuss suicidal ideas will improve  Description: Ability to disclose and discuss suicidal ideas will improve  3/23/2020 1506 by Serena Lake  Outcome: Ongoing   Patient denies suicidal ideations. Agreeable to talking with staff if thoughts to harm self arise. Q15 minute checks maintained for safety.

## 2020-03-23 NOTE — PLAN OF CARE
[address] [place] [address]  Day 3 Interdisciplinary Treatment Plan NOTE    Review Date & Time: 3/23/2020 1305    Admission Type:   Admission Type: Voluntary    Reason for admission:  Reason for Admission: voices to harm self  Estimated Length of Stay: 5-7 days  Estimated Discharge Date Update: to be determined by physician    PATIENT STRENGTHS:  Patient Strengths Strengths: Connection to output provider, No significant Physical Illness  Patient Strengths and Limitations:Limitations: Tendency to isolate self, Unrealistic self-view, Inappropriate/potentially harmful leisure interests, Difficulty problem solving/relies on others to help solve problems, Difficult relationships / poor social skills  Addictive Behavior:Addictive Behavior  In the past 3 months, have you felt or has someone told you that you have a problem with:  : None  Do you have a history of Chemical Use?: No  Do you have a history of Alcohol Use?: No  Do you have a history of Street Drug Abuse?: No  Histroy of Prescripton Drug Abuse?: No  Medical Problems:  Past Medical History:   Diagnosis Date    Acute depression 9/8/2018    Back pain     DM2 (diabetes mellitus, type 2) ([facility] 4/21/2014    Generalized OA     GERD (gastroesophageal reflux disease)     HTN (hypertension)     Schizoaffective disorder, bipolar type ([facility] 3/29/2019    Unspecified sleep apnea     c  pap       Risk:  Fall RiskTotal: 77  Mick Scale Mick Scale Score: 22  BVC Total: 0  Change in scores no Changes to plan of Care no    Status EXAM:   Status and Exam  Normal: No  Facial Expression: Flat  Affect: Appropriate  Level of Consciousness: Alert  Mood:Normal: No  Mood: Depressed, Anxious  Motor Activity:Normal: No  Motor Activity: Decreased  Interview Behavior: Cooperative, Evasive  Preception: [place] to Person, [place] to Time, [place] to Place, [place] to Situation  Attention:Normal: No  Attention: Distractible  Thought Processes: Blocking  Thought Content:Normal: No  Thought discharge goals, continue with medication compliance    SHORT-TERM GOALS UPDATE:   Time frame for Short-Term Goals: 5-7 days    LONG-TERM GOALS UPDATE:   Time frame for Long-Term Goals: 6 months  Members Present in Team Meeting: See Signature Sheet    Magdamarcie Villela

## 2020-03-23 NOTE — GROUP NOTE
Group Therapy Note    Date: 3/23/2020    Group Start Time: 2030  Group End Time: 2130  Group Topic: Relaxation    [facility]    Dian Nuñez        Group Therapy Note    Attendees: 12/19         Patient's Goal:  Relaxation Group    Notes:  Socialization, activity, 1:1 talk time    Status After Intervention:  Improved    Participation Level:  Active Listener and Interactive    Participation Quality: Appropriate, Attentive, Sharing and Supportive      Speech:  normal      Thought Process/Content: Logical      Affective Functioning: Congruent      Mood: within normal limits      Level of consciousness:  Alert, Oriented x4 and Attentive      Response to Learning: Able to verbalize current knowledge/experience, Able to verbalize/acknowledge new learning, Able to retain information, Capable of insight and Progressing to goal      Endings: None Reported    Modes of Intervention: Support, Socialization and Activity      Discipline Responsible: [address] [facility]      Signature:  Dian Nuñez

## 2020-03-24 LAB
GLUCOSE BLD-MCNC: 143 MG/DL (ref 75–110)
GLUCOSE BLD-MCNC: 150 MG/DL (ref 75–110)
GLUCOSE BLD-MCNC: 211 MG/DL (ref 75–110)

## 2020-03-24 PROCEDURE — 1240000000 HC EMOTIONAL WELLNESS R&B

## 2020-03-24 PROCEDURE — 6370000000 HC RX 637 (ALT 250 FOR IP): Performed by: PSYCHIATRY & NEUROLOGY

## 2020-03-24 PROCEDURE — 82947 ASSAY GLUCOSE BLOOD QUANT: CPT

## 2020-03-24 RX ORDER — QUETIAPINE FUMARATE 400 MG/1
400 TABLET, FILM COATED ORAL NIGHTLY
Qty: 60 TABLET | Refills: 3 | Status: SHIPPED | OUTPATIENT
Start: 2020-03-24 | End: 2021-12-06 | Stop reason: DRUGHIGH

## 2020-03-24 RX ORDER — CLOTRIMAZOLE 1 %
CREAM (GRAM) TOPICAL 2 TIMES DAILY
Status: DISCONTINUED | OUTPATIENT
Start: 2020-03-24 | End: 2020-03-25 | Stop reason: HOSPADM

## 2020-03-24 RX ORDER — BUPROPION HYDROCHLORIDE 150 MG/1
150 TABLET ORAL EVERY MORNING
Qty: 30 TABLET | Refills: 3 | Status: ON HOLD | OUTPATIENT
Start: 2020-03-25 | End: 2020-08-15

## 2020-03-24 RX ADMIN — GABAPENTIN 800 MG: 400 CAPSULE ORAL at 20:51

## 2020-03-24 RX ADMIN — OXYCODONE AND ACETAMINOPHEN 1 TABLET: 5; 325 TABLET ORAL at 06:20

## 2020-03-24 RX ADMIN — CLOTRIMAZOLE: 10 CREAM TOPICAL at 20:50

## 2020-03-24 RX ADMIN — TRAZODONE HYDROCHLORIDE 50 MG: 50 TABLET ORAL at 20:51

## 2020-03-24 RX ADMIN — GLIMEPIRIDE 4 MG: 4 TABLET ORAL at 06:20

## 2020-03-24 RX ADMIN — OXYCODONE AND ACETAMINOPHEN 1 TABLET: 5; 325 TABLET ORAL at 12:25

## 2020-03-24 RX ADMIN — METFORMIN HYDROCHLORIDE 1000 MG: 500 TABLET ORAL at 17:11

## 2020-03-24 RX ADMIN — GABAPENTIN 800 MG: 400 CAPSULE ORAL at 08:19

## 2020-03-24 RX ADMIN — OXYCODONE HYDROCHLORIDE 5 MG: 5 TABLET ORAL at 18:33

## 2020-03-24 RX ADMIN — QUETIAPINE FUMARATE 400 MG: 200 TABLET ORAL at 20:50

## 2020-03-24 RX ADMIN — OXYCODONE AND ACETAMINOPHEN 1 TABLET: 5; 325 TABLET ORAL at 18:33

## 2020-03-24 RX ADMIN — GABAPENTIN 800 MG: 400 CAPSULE ORAL at 15:26

## 2020-03-24 RX ADMIN — AMLODIPINE BESYLATE 5 MG: 5 TABLET ORAL at 08:19

## 2020-03-24 RX ADMIN — SERTRALINE HYDROCHLORIDE 100 MG: 100 TABLET ORAL at 08:19

## 2020-03-24 RX ADMIN — BUPROPION HYDROCHLORIDE 150 MG: 150 TABLET, FILM COATED, EXTENDED RELEASE ORAL at 08:19

## 2020-03-24 RX ADMIN — OXYCODONE HYDROCHLORIDE 5 MG: 5 TABLET ORAL at 06:20

## 2020-03-24 RX ADMIN — METFORMIN HYDROCHLORIDE 1000 MG: 500 TABLET ORAL at 08:19

## 2020-03-24 RX ADMIN — ALUMINUM HYDROXIDE, MAGNESIUM HYDROXIDE, AND SIMETHICONE 30 ML: 200; 200; 20 SUSPENSION ORAL at 11:39

## 2020-03-24 RX ADMIN — OXYCODONE HYDROCHLORIDE 5 MG: 5 TABLET ORAL at 12:25

## 2020-03-24 RX ADMIN — CLOTRIMAZOLE: 10 CREAM TOPICAL at 15:26

## 2020-03-24 ASSESSMENT — PAIN - FUNCTIONAL ASSESSMENT
PAIN_FUNCTIONAL_ASSESSMENT: 0-10
PAIN_FUNCTIONAL_ASSESSMENT: 0-10

## 2020-03-24 ASSESSMENT — PAIN SCALES - GENERAL
PAINLEVEL_OUTOF10: 0
PAINLEVEL_OUTOF10: 6
PAINLEVEL_OUTOF10: 5
PAINLEVEL_OUTOF10: 0
PAINLEVEL_OUTOF10: 6

## 2020-03-24 ASSESSMENT — PAIN DESCRIPTION - PAIN TYPE: TYPE: CHRONIC PAIN

## 2020-03-24 ASSESSMENT — PAIN DESCRIPTION - LOCATION: LOCATION: BACK

## 2020-03-24 NOTE — GROUP NOTE
Group Therapy Note    Date: 3/24/2020    Group Start Time: 1430  Group End Time: 1500  Group Topic: Relaxation    INES Carvajal    Patient refused to attend relaxation/ coping skills group at 1430 after encouragement from staff. 1:1 talk time provided by staff.      Signature:  Brittani Carvajal

## 2020-03-24 NOTE — GROUP NOTE
Group Therapy Note    Date: 3/24/2020    Group Start Time: 1100  Group End Time: 36  Group Topic: Psychoeducation    INES Alvarez        Group Therapy Note    Attendees: 7/7       Writer and client meet for a 1:1 psychotherapy to discuss discharge planning, safety planning, as well as reviewing reason for admission. SW offers patient supportive listening, encouragement; used empowerment strategies and psycho-education on management of dual dx symptoms, SW encouraged patient to attend group sessions and engage with staff to progress to goals in treatment    Signature:   Toribio Alvarez

## 2020-03-24 NOTE — GROUP NOTE
Group Therapy Note    Date: 3/24/2020    Group Start Time: 1600  Group End Time: 1622  Group Topic: Group Documentation    [facility]    Jihan Browne RN        Group Therapy Note    Attendees:          Patient's Goal:  Discharge planning    Notes:  Making medications a priority    Status After Intervention:  Unchanged    Participation Level: Minimal    Participation Quality: Lethargic      Speech:  normal      Thought Process/Content: Linear      Affective Functioning: Congruent      Mood: depressed      Level of consciousness:  Inattentive      Response to Learning: Able to retain information      Endings: None Reported    Modes of Intervention: Education      Discipline Responsible: Registered Nurse      Signature:  Jihan Browne RN

## 2020-03-24 NOTE — PROGRESS NOTES
PROGRESS NOTE  The chart has been reviewed and the patient was interviewed at the bedside. The patient reported an improvement in his mood. He denies side effects of medications. He denied visual or auditory hallucinations. The patient denied current thoughts of harming self or others. The patient was compliant with his medications. The discharge plan and relapse prevention plan has been discussed with the patient. The patient agreed to be discharged tomorrow. Safety plan has been discussed with the patient. MENTAL STATUS EXAMINATION:    /87   Pulse 104   Temp 97.8 °F (36.6 °C) (Oral)   Resp 14   Ht 5' 5\" (1.651 m)   Wt 226 lb (102.5 kg)   SpO2 97%   BMI 37.61 kg/m²     MOOD-is euthymic  Affect-is constricted  Patient denied helpless hopeless and worthless  Psychomotor activity : Within normal limits. APPEARANCE:  Personal hygiene is fair. PSYCHOSIS:  Denies auditory or visual hallucinations. Denies paranoid delusions. ORIENTATION:  Oriented to time place and person. Recent and remote memory is grossly intact. Intelligence appears to be average  CONCENTRATION:  poor. SPEECH : goal directed , but slow . DIAGNOSIS:    Principal Problem:    Depression, major, recurrent, severe with psychosis ([address]  Active Problems:    GERD (gastroesophageal reflux disease)    DM2 (diabetes mellitus, type 2) ([facility]  Resolved Problems:    * No resolved hospital problems.  *      LAB:    Recent Results (from the past 72 hour(s))   POC Glucose Fingerstick    Collection Time: 03/22/20  7:56 AM   Result Value Ref Range    POC Glucose 235 (H) 75 - 110 mg/dL   POC Glucose Fingerstick    Collection Time: 03/22/20  4:09 PM   Result Value Ref Range    POC Glucose 137 (H) 75 - 110 mg/dL   POC Glucose Fingerstick to generate a document that actually reflects the content of the visit, no guarantees can be provided that every mistake has been identified and corrected by editing.

## 2020-03-24 NOTE — PROGRESS NOTES
PROGRESS NOTE  The chart has been reviewed and the patient was interviewed in the common area. The patient reported a slight improvement in his mood. He denied current thoughts of harming self or others. However, he reported having thoughts early this morning. He denied visual or auditory hallucinations. He denies side effects of medications. He was guarded with a flat affect. The patient attended some of the therapy groups in the unit. However, the patient is overall isolative to self with poor interactions with others. Safety plan has been discussed with the patient. The patient agreed to continue the same medications and to monitor for depressed mood and suicidal ideation. The discharge plan and the relapse prevention plan has been discussed with the patient. MENTAL STATUS EXAMINATION:    BP (!) 138/93   Pulse 98   Temp 97.9 °F (36.6 °C) (Oral)   Resp 14   Ht 5' 5\" (1.651 m)   Wt 226 lb (102.5 kg)   SpO2 97%   BMI 37.61 kg/m²     MOOD-is dysphoric   Affect-is depressed  Patient feels helpless hopeless and worthless  Psychomotor activity : decreased. APPEARANCE:  Personal hygiene is poor and patient is neglecting his appearance. Patient is somewhat unkempt  PSYCHOSIS:  Denies auditory or visual hallucinations. Denies paranoid delusions. ORIENTATION:  Oriented to time place and person. Recent and remote memory is grossly intact. Intelligence appears to be average  CONCENTRATION:  poor. SPEECH : goal directed , but slow . DIAGNOSIS:    Principal Problem:    Depression, major, recurrent, severe with psychosis ([facility]  Active Problems:    GERD (gastroesophageal reflux disease)    DM2 (diabetes mellitus, type 2) ([facility]  Resolved Problems:    * No resolved hospital problems. *      LAB:    Recent Results (from the past 72 hour(s))   POC Glucose Fingerstick    Collection Time: 03/21/20 11:09 AM   Result Value Ref Range    POC Glucose 206 (H) 75 - 110 mg/dL   POC Glucose Fingerstick    Collection Time: 03/22/20  7:56 AM   Result Value Ref Range    POC Glucose 235 (H) 75 - 110 mg/dL   POC Glucose Fingerstick    Collection Time: 03/22/20  4:09 PM   Result Value Ref Range    POC Glucose 137 (H) 75 - 110 mg/dL   POC Glucose Fingerstick    Collection Time: 03/23/20  7:30 AM   Result Value Ref Range    POC Glucose 177 (H) 75 - 110 mg/dL   POC Glucose Fingerstick    Collection Time: 03/23/20 11:48 AM   Result Value Ref Range    POC Glucose 129 (H) 75 - 110 mg/dL   POC Glucose Fingerstick    Collection Time: 03/23/20  4:37 PM   Result Value Ref Range    POC Glucose 220 (H) 75 - 110 mg/dL   POC Glucose Fingerstick    Collection Time: 03/23/20  8:07 PM   Result Value Ref Range    POC Glucose 182 (H) 75 - 110 mg/dL           TREATMENT PLAN:     metFORMIN  1,000 mg Oral BID WC    amLODIPine  5 mg Oral Daily    buPROPion  150 mg Oral QAM    gabapentin  800 mg Oral TID    glimepiride  4 mg Oral QAM AC    sertraline  100 mg Oral Daily    QUEtiapine  400 mg Oral Nightly     cyclobenzaprine, hydrOXYzine, traZODone, acetaminophen, aluminum & magnesium hydroxide-simethicone, magnesium hydroxide, oxyCODONE-acetaminophen **AND** oxyCODONE, nicotine polacrilex    Chart was reviewed and the patient been interviewed  Continue the same medications as prescribed above  Patient was discussed with the  and the treatment team.   Provided Supportive and insight-oriented psychotherapy psychotherapy  Recommended involvement in Unit milieu  Provided empathic listening, validation and support  Patient acknowledged and mutually decided to continue with current treatment plan  Discharge planning was discussed with the patient.      Ariel Samuels MD        This note was created with the assistance of [employer] speech-recognition program.  Although the intention is to generate a document that actually reflects the content of the visit, no guarantees can be provided that every mistake has been identified and corrected by editing.

## 2020-03-25 VITALS
DIASTOLIC BLOOD PRESSURE: 86 MMHG | SYSTOLIC BLOOD PRESSURE: 130 MMHG | OXYGEN SATURATION: 100 % | RESPIRATION RATE: 14 BRPM | HEIGHT: 65 IN | HEART RATE: 106 BPM | WEIGHT: 226 LBS | TEMPERATURE: 97.5 F | BODY MASS INDEX: 37.65 KG/M2

## 2020-03-25 LAB — GLUCOSE BLD-MCNC: 271 MG/DL (ref 75–110)

## 2020-03-25 PROCEDURE — 82947 ASSAY GLUCOSE BLOOD QUANT: CPT

## 2020-03-25 PROCEDURE — 6370000000 HC RX 637 (ALT 250 FOR IP): Performed by: PSYCHIATRY & NEUROLOGY

## 2020-03-25 RX ADMIN — OXYCODONE HYDROCHLORIDE 5 MG: 5 TABLET ORAL at 08:22

## 2020-03-25 RX ADMIN — GABAPENTIN 800 MG: 400 CAPSULE ORAL at 07:55

## 2020-03-25 RX ADMIN — OXYCODONE HYDROCHLORIDE 5 MG: 5 TABLET ORAL at 02:00

## 2020-03-25 RX ADMIN — METFORMIN HYDROCHLORIDE 1000 MG: 500 TABLET ORAL at 07:55

## 2020-03-25 RX ADMIN — ALUMINUM HYDROXIDE, MAGNESIUM HYDROXIDE, AND SIMETHICONE 30 ML: 200; 200; 20 SUSPENSION ORAL at 10:03

## 2020-03-25 RX ADMIN — GLIMEPIRIDE 4 MG: 4 TABLET ORAL at 06:54

## 2020-03-25 RX ADMIN — CLOTRIMAZOLE: 10 CREAM TOPICAL at 07:56

## 2020-03-25 RX ADMIN — AMLODIPINE BESYLATE 5 MG: 5 TABLET ORAL at 07:55

## 2020-03-25 RX ADMIN — OXYCODONE AND ACETAMINOPHEN 1 TABLET: 5; 325 TABLET ORAL at 02:00

## 2020-03-25 RX ADMIN — OXYCODONE AND ACETAMINOPHEN 1 TABLET: 5; 325 TABLET ORAL at 08:22

## 2020-03-25 RX ADMIN — SERTRALINE HYDROCHLORIDE 100 MG: 100 TABLET ORAL at 07:55

## 2020-03-25 RX ADMIN — BUPROPION HYDROCHLORIDE 150 MG: 150 TABLET, FILM COATED, EXTENDED RELEASE ORAL at 07:55

## 2020-03-25 ASSESSMENT — PAIN SCALES - GENERAL
PAINLEVEL_OUTOF10: 6
PAINLEVEL_OUTOF10: 4
PAINLEVEL_OUTOF10: 0

## 2020-03-25 ASSESSMENT — PAIN - FUNCTIONAL ASSESSMENT: PAIN_FUNCTIONAL_ASSESSMENT: 0-10

## 2020-03-25 NOTE — GROUP NOTE
Group Therapy Note    Date: 3/25/2020    Group Start Time: 7419  Group End Time: 0900  Group Topic: Community Meeting    INES QUESADA    Frances Sample    Patient refused to attend community meeting/ goals group at [facility] after encouragement from staff. 1:1 talk time provided by staff.       Signature:  Frances Fay

## 2020-03-25 NOTE — PROGRESS NOTES
7:30 AM   Result Value Ref Range    POC Glucose 177 (H) 75 - 110 mg/dL   POC Glucose Fingerstick    Collection Time: 03/23/20 11:48 AM   Result Value Ref Range    POC Glucose 129 (H) 75 - 110 mg/dL   POC Glucose Fingerstick    Collection Time: 03/23/20  4:37 PM   Result Value Ref Range    POC Glucose 220 (H) 75 - 110 mg/dL   POC Glucose Fingerstick    Collection Time: 03/23/20  8:07 PM   Result Value Ref Range    POC Glucose 182 (H) 75 - 110 mg/dL   POC Glucose Fingerstick    Collection Time: 03/24/20  7:58 AM   Result Value Ref Range    POC Glucose 150 (H) 75 - 110 mg/dL   POC Glucose Fingerstick    Collection Time: 03/24/20 11:45 AM   Result Value Ref Range    POC Glucose 143 (H) 75 - 110 mg/dL   POC Glucose Fingerstick    Collection Time: 03/24/20  4:45 PM   Result Value Ref Range    POC Glucose 211 (H) 75 - 110 mg/dL           TREATMENT PLAN:     clotrimazole   Topical BID    metFORMIN  1,000 mg Oral BID WC    amLODIPine  5 mg Oral Daily    buPROPion  150 mg Oral QAM    gabapentin  800 mg Oral TID    glimepiride  4 mg Oral QAM AC    sertraline  100 mg Oral Daily    QUEtiapine  400 mg Oral Nightly     cyclobenzaprine, hydrOXYzine, traZODone, acetaminophen, aluminum & magnesium hydroxide-simethicone, magnesium hydroxide, oxyCODONE-acetaminophen **AND** oxyCODONE, nicotine polacrilex    Chart was reviewed and the patient has been interviewed   Continue the same medications as prescribed above  Patient was discussed with the  and the treatment team.   Provided Supportive and insight-oriented psychotherapy psychotherapy  Recommended involvement in Unit milieu  Provided empathic listening, validation and support  Patient acknowledged and mutually decided to continue with current treatment plan  Discharge planning was discussed with the patient.      Estuardo Luna MD        This note was created with the assistance of a speech-recognition program.  Although the intention is to generate a document that actually reflects the content of the visit, no guarantees can be provided that every mistake has been identified and corrected by editing.

## 2020-03-25 NOTE — PLAN OF CARE
Problem: Altered Mood, Depressive Behavior:  Goal: Ability to disclose and discuss suicidal ideas will improve  Description: Ability to disclose and discuss suicidal ideas will improve  3/24/2020 2125 by Lona Rizo RN  Outcome: Ongoing  Note: Patient denies suicidal ideation at this time. Problem: Tobacco Use:  Goal: Inpatient tobacco use cessation counseling participation  Description: Inpatient tobacco use cessation counseling participation  3/24/2020 2125 by Lona Rizo RN  Outcome: Ongoing  Note: Pt refuses education at this time     Problem: Pain:  Goal: Control of chronic pain  Description: Control of chronic pain  3/24/2020 2125 by Lona Rizo RN  Note: Lower back pain 8/10 at  this time.  Controlled with medication and distraction techniques

## 2020-03-25 NOTE — CARE COORDINATION
sertraline 100 MG tablet  Commonly known as:  ZOLOFT  Take 1 tablet by mouth daily  Notes to patient:  depression     traZODone 50 MG tablet  Commonly known as:  DESYREL  Notes to patient:  Sleep           Where to Get Your Medications      These medications were sent to [facility] [address] [facility] [address]    Phone:  [phone]   · buPROPion 150 MG extended release tablet  · metFORMIN 1000 MG tablet  · QUEtiapine 400 MG tablet         Follow Up Appointment: KRISTINA Carcamo CNP  [address] [place] [address] Bernie Richardson   [phone]          Discharge to home:  [place] [address]  Go on 3/25/2020  Please send home by [address]  Phone: [phone]  Fax: [phone] on 4/2/2020 Thursday, April 2 at 12:00pm.

## 2020-03-25 NOTE — PLAN OF CARE
Problem: Altered Mood, Depressive Behavior:  Goal: Able to verbalize acceptance of life and situations over which he or she has no control  Description: Able to verbalize acceptance of life and situations over which he or she has no control  Outcome: Ongoing  Goal: Able to verbalize and/or display a decrease in depressive symptoms  Description: Able to verbalize and/or display a decrease in depressive symptoms  Outcome: Ongoing  Note: Patient alert and orient x 4. Speech clear. Patient presents this shift with flat affect, maintains good eye contact during interaction. Patient cooperative and calm on the unit. Patient denies feelings of depression. Patient denies suicidal/homicidal/self harm ideations. Patient denies hallucinations of any kind. Patient does not appear to be responding to any kind of internal stimuli. Patient encouraged to attend groups to learn new and healthy methods of coping to manage life and situations over which he has no control. Patient compliant with medications. Support provided. Safety maintained with every 15 minute checks. Goal: Ability to disclose and discuss suicidal ideas will improve  Description: Ability to disclose and discuss suicidal ideas will improve  3/25/2020 0848 by Raul Rasmussen RN  Outcome: Ongoing  3/24/2020 2125 by Palmira Blizzard, RN  Outcome: Ongoing  Note: Patient denies suicidal ideation at this time. Problem: Metabolic:  Goal: Ability to maintain appropriate glucose levels will improve  Description: Ability to maintain appropriate glucose levels will improve  Outcome: Ongoing  Note: Patient blood sugar monitored before breakfast and patient compliant with medications as ordered. Problem: Tobacco Use:  Goal: Inpatient tobacco use cessation counseling participation  Description: Inpatient tobacco use cessation counseling participation  3/25/2020 0848 by Raul Rasmussen RN  Outcome: Ongoing  Note: Patient denies nicotine cravings.  Will continue to

## 2020-03-29 NOTE — DISCHARGE SUMMARY
DISCHARGE      SUMMARY                                                               DEMOGRAPHICS          Marilu Piña is a 47 y.o.   male     DATE OF ADMISSION: 3/21/2020  DATE OF DISCHARGE: 03/29/20  DISCHARGE DIAGNOSES:   Principal Problem:    Depression, major, recurrent, severe with psychosis ([facility]  Active Problems:    GERD (gastroesophageal reflux disease)    DM2 (diabetes mellitus, type 2) ([facility].)  Resolved Problems:    * No resolved hospital problems. *  Marilu Piña is a 47 y.o. male who presents with Depression, major, recurrent, severe with psychosis ([facility].)  The patient has been transferred to from the emergency department where he presented there complaining of the voices are telling him to stop taking his home medications and to stab himself in the neck. The patient has been medically cleared and transferred to [facility] for further evaluation and management.      HOSPITAL COURSE    The patient presented as a 59-year-old -American man who is single, a father of a 44-year-old daughter. [facility] is on disability depending on SSI assistance and lives by himself.  The patient reported that he stopped his medication over the last week because \"the voices were telling me to\". [facility] reported that voices getting worse and telling him to kill himself by stabbing himself.  The patient reported history of schizophrenia and schizoaffective disorder bipolar type. [facility] was prescribed Seroquel, Zoloft and the trazodone. [facility] is a client of [facility]. [facility] reported feeling depressed and complaining of back pain. [facility] reported feeling hopeless, helpless and worthless. [facility] reported life is not worth living.  The patient reported problem falling and staying asleep \"I haven't slept for the last 3 days\".  The patient denied previous suicidal attempts but reported repeated suicidal ideations.  The patient had numerous with his medications. The discharge plan and relapse prevention plan has been discussed with the patient. The patient agreed to be discharged tomorrow. Safety plan has been discussed with the patient. During the later part of hospitalization mood and affect started improving. Patient was feeling more hopeful. No side effects from medication reported. CONDITION ON DISCHARGE    Blood pressure 130/86, pulse 106, temperature 97.5 °F (36.4 °C), temperature source Oral, resp. rate 14, height 5' 5\" (1.651 m), weight 226 lb (102.5 kg), SpO2 100 %. At theTime discharge:  Patient  had mild anxiety. Overall mood and affect has improved. Patient denied any Suicidal or homicidal thoughts  Patient denied Hallucinations or delusions. Patient was oriented to time place and person    No side effects from medication reported    EXAM:    /86   Pulse 106   Temp 97.5 °F (36.4 °C) (Oral)   Resp 14   Ht 5' 5\" (1.651 m)   Wt 226 lb (102.5 kg)   SpO2 100%   BMI 37.61 kg/m²   Patient did not have any physical complaint at the time of discharge    No results found for this or any previous visit (from the past 72 hour(s)). DISCHARGE INSTRUCTIONS:  Disposition: Discharge to :  HOME/SELF CARE  Condition on discharge:  GOOD  Regular diet  Activities as tolerated      DISCHARGE MEDS:     Medication List      CHANGE how you take these medications    amLODIPine 5 MG tablet  Commonly known as:  NORVASC  What changed:  Another medication with the same name was removed. Continue taking this medication, and follow the directions you see here. Notes to patient:  Blood pressure     gabapentin 400 MG capsule  Commonly known as:  NEURONTIN  What changed:  Another medication with the same name was removed. Continue taking this medication, and follow the directions you see here.   Notes to patient:  pain     glimepiride 4 MG tablet  Commonly known as:  AMARYL  What changed:  Another medication with the same name was removed. Continue taking this medication, and follow the directions you see here.   Notes to patient:  Diabetes     metFORMIN 1000 MG tablet  Commonly known as:  GLUCOPHAGE  Take 1 tablet by mouth 2 times daily (with meals)  What changed:  when to take this  Notes to patient:  Diabetes     QUEtiapine 400 MG tablet  Commonly known as:  SEROQUEL  Take 1 tablet by mouth nightly  What changed:    · medication strength  · how much to take  Notes to patient:  Mood/thoughts        CONTINUE taking these medications    buPROPion 150 MG extended release tablet  Commonly known as:  WELLBUTRIN XL  Take 1 tablet by mouth every morning  Notes to patient:  depression     cyclobenzaprine 10 MG tablet  Commonly known as:  FLEXERIL  Notes to patient:  Muscle spasms     hydrocortisone 2.5 % cream  Notes to patient:  abdomen     hydrOXYzine 25 MG tablet  Commonly known as:  ATARAX  Notes to patient:  Anxiety     oxyCODONE-acetaminophen  MG per tablet  Commonly known as:  PERCOCET  Notes to patient:  Pain     pantoprazole 40 MG tablet  Commonly known as:  PROTONIX  Take 1 tablet by mouth every morning (before breakfast)  Notes to patient:  Acid reflux     sertraline 100 MG tablet  Commonly known as:  ZOLOFT  Take 1 tablet by mouth daily  Notes to patient:  depression     traZODone 50 MG tablet  Commonly known as:  DESYREL  Notes to patient:  Sleep           Where to Get Your Medications      These medications were sent to [facility], [address], [address] [address]    Phone:  [phone]   · buPROPion 150 MG extended release tablet  · metFORMIN 1000 MG tablet  · QUEtiapine 400 MG tablet           Navarro Morales MD

## 2020-08-15 ENCOUNTER — HOSPITAL ENCOUNTER (INPATIENT)
Age: 55
LOS: 4 days | Discharge: HOME | DRG: 751 | End: 2020-08-19
Attending: EMERGENCY MEDICINE | Admitting: PSYCHIATRY & NEUROLOGY
Payer: COMMERCIAL

## 2020-08-15 PROBLEM — F20.9 SCHIZOPHRENIA (HCC): Status: ACTIVE | Noted: 2020-08-15

## 2020-08-15 LAB
ABSOLUTE EOS #: 0.3 K/UL (ref 0–0.4)
ABSOLUTE IMMATURE GRANULOCYTE: ABNORMAL K/UL (ref 0–0.3)
ABSOLUTE LYMPH #: 1.7 K/UL (ref 1–4.8)
ABSOLUTE MONO #: 0.5 K/UL (ref 0.1–1.3)
ANION GAP SERPL CALCULATED.3IONS-SCNC: 9 MMOL/L (ref 9–17)
BASOPHILS # BLD: 1 % (ref 0–2)
BASOPHILS ABSOLUTE: 0 K/UL (ref 0–0.2)
BUN BLDV-MCNC: 12 MG/DL (ref 6–20)
BUN/CREAT BLD: ABNORMAL (ref 9–20)
CALCIUM SERPL-MCNC: 8.9 MG/DL (ref 8.6–10.4)
CHLORIDE BLD-SCNC: 99 MMOL/L (ref 98–107)
CO2: 24 MMOL/L (ref 20–31)
CREAT SERPL-MCNC: 0.98 MG/DL (ref 0.7–1.2)
DIFFERENTIAL TYPE: ABNORMAL
EOSINOPHILS RELATIVE PERCENT: 6 % (ref 0–4)
GFR AFRICAN AMERICAN: >60 ML/MIN
GFR NON-AFRICAN AMERICAN: >60 ML/MIN
GFR SERPL CREATININE-BSD FRML MDRD: ABNORMAL ML/MIN/{1.73_M2}
GFR SERPL CREATININE-BSD FRML MDRD: ABNORMAL ML/MIN/{1.73_M2}
GLUCOSE BLD-MCNC: 359 MG/DL (ref 70–99)
GLUCOSE BLD-MCNC: 403 MG/DL (ref 75–110)
GLUCOSE BLD-MCNC: 436 MG/DL (ref 75–110)
GLUCOSE BLD-MCNC: 447 MG/DL (ref 75–110)
HCT VFR BLD CALC: 44.5 % (ref 41–53)
HEMOGLOBIN: 15.4 G/DL (ref 13.5–17.5)
IMMATURE GRANULOCYTES: ABNORMAL %
LYMPHOCYTES # BLD: 34 % (ref 24–44)
MCH RBC QN AUTO: 29.8 PG (ref 26–34)
MCHC RBC AUTO-ENTMCNC: 34.6 G/DL (ref 31–37)
MCV RBC AUTO: 86.3 FL (ref 80–100)
MONOCYTES # BLD: 9 % (ref 1–7)
NRBC AUTOMATED: ABNORMAL PER 100 WBC
PDW BLD-RTO: 14.6 % (ref 11.5–14.9)
PLATELET # BLD: 255 K/UL (ref 150–450)
PLATELET ESTIMATE: ABNORMAL
PMV BLD AUTO: 7.6 FL (ref 6–12)
POTASSIUM SERPL-SCNC: 4.3 MMOL/L (ref 3.7–5.3)
RBC # BLD: 5.15 M/UL (ref 4.5–5.9)
RBC # BLD: ABNORMAL 10*6/UL
SARS-COV-2, PCR: NORMAL
SARS-COV-2, RAPID: NOT DETECTED
SARS-COV-2: NORMAL
SEG NEUTROPHILS: 50 % (ref 36–66)
SEGMENTED NEUTROPHILS ABSOLUTE COUNT: 2.5 K/UL (ref 1.3–9.1)
SODIUM BLD-SCNC: 132 MMOL/L (ref 135–144)
SOURCE: NORMAL
WBC # BLD: 5 K/UL (ref 3.5–11)
WBC # BLD: ABNORMAL 10*3/UL

## 2020-08-15 PROCEDURE — 85025 COMPLETE CBC W/AUTO DIFF WBC: CPT

## 2020-08-15 PROCEDURE — 1240000000 HC EMOTIONAL WELLNESS R&B

## 2020-08-15 PROCEDURE — 99285 EMERGENCY DEPT VISIT HI MDM: CPT

## 2020-08-15 PROCEDURE — 99222 1ST HOSP IP/OBS MODERATE 55: CPT | Performed by: INTERNAL MEDICINE

## 2020-08-15 PROCEDURE — 6370000000 HC RX 637 (ALT 250 FOR IP): Performed by: INTERNAL MEDICINE

## 2020-08-15 PROCEDURE — 83036 HEMOGLOBIN GLYCOSYLATED A1C: CPT

## 2020-08-15 PROCEDURE — 36415 COLL VENOUS BLD VENIPUNCTURE: CPT

## 2020-08-15 PROCEDURE — U0002 COVID-19 LAB TEST NON-CDC: HCPCS

## 2020-08-15 PROCEDURE — 6370000000 HC RX 637 (ALT 250 FOR IP): Performed by: NURSE PRACTITIONER

## 2020-08-15 PROCEDURE — 82947 ASSAY GLUCOSE BLOOD QUANT: CPT

## 2020-08-15 PROCEDURE — 80048 BASIC METABOLIC PNL TOTAL CA: CPT

## 2020-08-15 RX ORDER — LISINOPRIL 10 MG/1
10 TABLET ORAL DAILY
COMMUNITY

## 2020-08-15 RX ORDER — NICOTINE 21 MG/24HR
1 PATCH, TRANSDERMAL 24 HOURS TRANSDERMAL DAILY
Status: DISCONTINUED | OUTPATIENT
Start: 2020-08-15 | End: 2020-08-16

## 2020-08-15 RX ORDER — DEXTROSE MONOHYDRATE 25 G/50ML
12.5 INJECTION, SOLUTION INTRAVENOUS PRN
Status: DISCONTINUED | OUTPATIENT
Start: 2020-08-15 | End: 2020-08-19 | Stop reason: HOSPADM

## 2020-08-15 RX ORDER — ACETAMINOPHEN 325 MG/1
650 TABLET ORAL EVERY 4 HOURS PRN
Status: DISCONTINUED | OUTPATIENT
Start: 2020-08-15 | End: 2020-08-16 | Stop reason: SDUPTHER

## 2020-08-15 RX ORDER — TRAZODONE HYDROCHLORIDE 50 MG/1
50 TABLET ORAL NIGHTLY PRN
Status: DISCONTINUED | OUTPATIENT
Start: 2020-08-15 | End: 2020-08-19 | Stop reason: HOSPADM

## 2020-08-15 RX ORDER — DEXTROSE MONOHYDRATE 50 MG/ML
100 INJECTION, SOLUTION INTRAVENOUS PRN
Status: DISCONTINUED | OUTPATIENT
Start: 2020-08-15 | End: 2020-08-19 | Stop reason: HOSPADM

## 2020-08-15 RX ORDER — GLIMEPIRIDE 4 MG/1
4 TABLET ORAL DAILY
Status: DISCONTINUED | OUTPATIENT
Start: 2020-08-15 | End: 2020-08-17

## 2020-08-15 RX ORDER — MAGNESIUM HYDROXIDE/ALUMINUM HYDROXICE/SIMETHICONE 120; 1200; 1200 MG/30ML; MG/30ML; MG/30ML
15 SUSPENSION ORAL EVERY 6 HOURS PRN
Status: DISCONTINUED | OUTPATIENT
Start: 2020-08-15 | End: 2020-08-19 | Stop reason: HOSPADM

## 2020-08-15 RX ORDER — BENZTROPINE MESYLATE 1 MG/ML
2 INJECTION INTRAMUSCULAR; INTRAVENOUS 2 TIMES DAILY PRN
Status: DISCONTINUED | OUTPATIENT
Start: 2020-08-15 | End: 2020-08-19 | Stop reason: HOSPADM

## 2020-08-15 RX ORDER — NICOTINE POLACRILEX 4 MG
15 LOZENGE BUCCAL PRN
Status: DISCONTINUED | OUTPATIENT
Start: 2020-08-15 | End: 2020-08-19 | Stop reason: HOSPADM

## 2020-08-15 RX ADMIN — METFORMIN HYDROCHLORIDE 1000 MG: 500 TABLET ORAL at 17:03

## 2020-08-15 RX ADMIN — TRAZODONE HYDROCHLORIDE 50 MG: 50 TABLET ORAL at 20:54

## 2020-08-15 RX ADMIN — GLIMEPIRIDE 4 MG: 4 TABLET ORAL at 20:54

## 2020-08-15 ASSESSMENT — SLEEP AND FATIGUE QUESTIONNAIRES
DIFFICULTY STAYING ASLEEP: NO
DIFFICULTY ARISING: NO
DO YOU USE A SLEEP AID: NO
DIFFICULTY FALLING ASLEEP: YES
RESTFUL SLEEP: NO
SLEEP PATTERN: INSOMNIA
DO YOU HAVE DIFFICULTY SLEEPING: YES

## 2020-08-15 ASSESSMENT — ENCOUNTER SYMPTOMS
NAUSEA: 0
EYE REDNESS: 0
SORE THROAT: 0
CONSTIPATION: 0
EYE DISCHARGE: 0
ABDOMINAL PAIN: 0
BLOOD IN STOOL: 0
CHEST TIGHTNESS: 0
EYE PAIN: 0
DIARRHEA: 0
FACIAL SWELLING: 0
VOMITING: 0
SHORTNESS OF BREATH: 0
COLOR CHANGE: 0
WHEEZING: 0
RHINORRHEA: 0
COUGH: 0
BACK PAIN: 0
TROUBLE SWALLOWING: 0
SINUS PRESSURE: 0

## 2020-08-15 ASSESSMENT — LIFESTYLE VARIABLES: HISTORY_ALCOHOL_USE: NO

## 2020-08-15 ASSESSMENT — PAIN - FUNCTIONAL ASSESSMENT: PAIN_FUNCTIONAL_ASSESSMENT: 0-10

## 2020-08-15 ASSESSMENT — PATIENT HEALTH QUESTIONNAIRE - PHQ9: SUM OF ALL RESPONSES TO PHQ QUESTIONS 1-9: 27

## 2020-08-15 ASSESSMENT — PAIN DESCRIPTION - PAIN TYPE: TYPE: CHRONIC PAIN

## 2020-08-15 ASSESSMENT — PAIN SCALES - GENERAL
PAINLEVEL_OUTOF10: 0
PAINLEVEL_OUTOF10: 8

## 2020-08-15 ASSESSMENT — PAIN DESCRIPTION - LOCATION: LOCATION: BACK

## 2020-08-15 NOTE — BH NOTE
Patient given tobacco quitline number 14436233493 at this time, refusing to call at this time, states \" I just dont want to quit now\"- patient given information as to the dangers of long term tobacco use. Continue to reinforce the importance of tobacco cessation.

## 2020-08-15 NOTE — ED PROVIDER NOTES
[address]  EMERGENCY DEPARTMENT ENCOUNTER      Pt Name: Kristen Scott  MRN: [id number]  [place] 1965  Date of evaluation: 8/15/20      CHIEF COMPLAINT       Chief Complaint   Patient presents with   [address] Problem         HISTORY OF PRESENT ILLNESS    Kristen Scott is a 54 y.o. male who presents complaining of Psychiatric Evaluation. Patient is here complaining of having severe depression with suicidal ideation. Patient states that he has been not taking his medications for the last week and a half because the voices in his head told him to stop. Patient states now they are telling him to stab himself. Patient states he is here to get help. Patient states he has not eaten anything in 3 days. Patient states that he has no chest pain cough fevers abdominal pain or any other somatic complaints. REVIEW OF SYSTEMS       Review of Systems   Constitutional: Positive for appetite change. Negative for activity change, chills, diaphoresis and fever. HENT: Negative for congestion, ear pain, facial swelling, nosebleeds, rhinorrhea, sinus pressure, sore throat and trouble swallowing. Eyes: Negative for pain, discharge and redness. Respiratory: Negative for cough, chest tightness, shortness of breath and wheezing. Cardiovascular: Negative for chest pain, palpitations and leg swelling. Gastrointestinal: Negative for abdominal pain, blood in stool, constipation, diarrhea, nausea and vomiting. Genitourinary: Negative for difficulty urinating, dysuria, flank pain, frequency, genital sores and hematuria. Musculoskeletal: Negative for arthralgias, back pain, gait problem, joint swelling, myalgias and neck pain. Skin: Negative for color change, pallor, rash and wound. Neurological: Negative for dizziness, tremors, seizures, syncope, speech difficulty, weakness, numbness and headaches. Psychiatric/Behavioral: Positive for dysphoric mood, hallucinations and suicidal ideas. he has been smoking cigarettes. He has a 20.50 pack-year smoking history. He has never used smokeless tobacco. He reports current drug use. Drugs: Marijuana and Cocaine. He reports that he does not drink alcohol. PHYSICAL EXAM     INITIAL VITALS: /68   Pulse 94   Temp 98.5 °F (36.9 °C) (Oral)   Resp 16   Ht 5' 5\" (1.651 m)   Wt 235 lb (106.6 kg)   SpO2 99%   BMI 39.11 kg/m²      Physical Exam  Constitutional:       General: He is not in acute distress. Appearance: He is well-developed. He is not diaphoretic. HENT:      Head: Normocephalic and atraumatic. Eyes:      General: No scleral icterus. Right eye: No discharge. Left eye: No discharge. Conjunctiva/sclera: Conjunctivae normal.      Pupils: Pupils are equal, round, and reactive to light. Cardiovascular:      Rate and Rhythm: Normal rate and regular rhythm. Heart sounds: Normal heart sounds. No murmur. No friction rub. No gallop. Pulmonary:      Effort: Pulmonary effort is normal. No respiratory distress. Breath sounds: Normal breath sounds. No wheezing or rales. Neurological:      Mental Status: He is alert and oriented to person, place, and time. Psychiatric:         Mood and Affect: Mood is depressed. Speech: Speech normal.         Behavior: Behavior is withdrawn. Thought Content: Thought content includes suicidal ideation. Thought content includes suicidal plan. DIAGNOSTIC RESULTS     LABS: All lab results were reviewed by myself, and all abnormals are listed below.   Labs Reviewed   COVID-19         MEDICAL DECISION MAKING:     Patient is medically cleared for psychiatric evaluation and admission      EMERGENCY DEPARTMENT COURSE:   Vitals:    Vitals:    08/15/20 0957   BP: 124/68   Pulse: 94   Resp: 16   Temp: 98.5 °F (36.9 °C)   TempSrc: Oral   SpO2: 99%   Weight: 235 lb (106.6 kg)   Height: 5' 5\" (1.651 m)       The patient was given the following medications while in the emergency department:  No orders of the defined types were placed in this encounter. -------------------------  10:17 AM EDT  Patient has been evaluated and will be admitted to the [facility] for further psychiatric evaluation and treatment      FINAL IMPRESSION      1.  Depression with suicidal ideation          DISPOSITION/PLAN   DISPOSITION Admitted 08/15/2020 10:03:37 AM      PATIENT REFERREDTO:  Sona Ching R [address]  [phone]            DISCHARGEMEDICATIONS:  New Prescriptions    No medications on file       (Please note that portions of this note were completed with a voice recognition program.  Efforts were made to edit thedictations but occasionally words are mis-transcribed.)    Jennifer Monge MD  Attending Emergency Physician                      Jennifer Monge MD  08/15/20 4094

## 2020-08-15 NOTE — GROUP NOTE
Group Therapy Note    Date: 8/15/2020    Group Start Time: 1330  Group End Time: 3303  Group Topic: Cognitive Skills    INES Adamson, CTRS        Group Therapy Note    Attendees: 4/19         Pt did not participate in Cognitive Skills Group at 1330 when encouraged by RT due to resting in room. Pt was offered talk time as an alternative to group but declined.        Discipline Responsible: Psychoeducational Specialist      Signature:  Lety Pham

## 2020-08-15 NOTE — BH NOTE
Pt declined RT assessment at this time. RT will seek out pt to complete assessment during next shift on 8/16/2020.

## 2020-08-15 NOTE — ED NOTES
Provisional Diagnosis:     Patient reported he \"hopped in a cab\" because the voices were \"too loud and too hard\". Psychosocial and Contextual Factors:   Patient has not taken his medications in over a week because the voices told him to stop taking them. C-SSRS Summary:  Patient reports the \"voices telling me to stab myself\". Patient: X  Family:   Agency:    Substance Abuse:   Patient denies any drug or alcohol use. Present Suicidal Behavior:  Patient denies SI, but the voices are telling him to \"stab himself\". Verbal: X    Attempt:    Past Suicidal Behavior:  Patient reports he has never attempted suicide because he always \"gets help right away\". Verbal: X    Attempt:      Self-Injurious/Self-Mutilation:  Patient reports engaging in self-injurious behavior a \"long time ago\". Trauma Identified:    Patient did not identify any trauma. Protective Factors:    Patient has stable housing and a stable income. Risk Factors:    Patient experiencing auditory hallucinations. Clinical Summary:    Patient is a 54year old  male who was brought to the ED by himself because the \"voices told him to stab himself\". Patient reports that he stopped taking his medications \"about a week ago\" because the voices told him to. He reports that he was \"doing ok\" but the voices got \"too hard and too loud\". Patient has not attempted suicide in the past because he \"gets help first\". Patient reports that he does not want to hurt himself, but the voices are telling him otherwise. Patient is linked with [place] [address] services through [facility] but he has not seen anyone in a \"couple months\". Patient has not been taking his medications for over a week. Patient is experiencing auditory hallucinations in the form of voices telling him what to do. Patient reports being fearful of these voices. Patient has a stable income and stable housing.     Patient reports he is not sleeping and he has not \"eaten in 3 days\". Patient denies any HI. Level of Care Disposition: This writer spoke with Dr. Brodreick Gibson in regards to inpatient hospitalization. Patient will be admitted under the diagnosis schizophrenia under the voluntary application for admission.

## 2020-08-16 PROBLEM — F33.9 MDD (MAJOR DEPRESSIVE DISORDER), RECURRENT EPISODE (HCC): Status: ACTIVE | Noted: 2020-08-16

## 2020-08-16 LAB
ESTIMATED AVERAGE GLUCOSE: 263 MG/DL
GLUCOSE BLD-MCNC: 157 MG/DL (ref 75–110)
GLUCOSE BLD-MCNC: 167 MG/DL (ref 75–110)
GLUCOSE BLD-MCNC: 186 MG/DL (ref 75–110)
HBA1C MFR BLD: 10.8 % (ref 4–6)

## 2020-08-16 PROCEDURE — 6370000000 HC RX 637 (ALT 250 FOR IP): Performed by: NURSE PRACTITIONER

## 2020-08-16 PROCEDURE — 90792 PSYCH DIAG EVAL W/MED SRVCS: CPT | Performed by: PSYCHIATRY & NEUROLOGY

## 2020-08-16 PROCEDURE — 82947 ASSAY GLUCOSE BLOOD QUANT: CPT

## 2020-08-16 PROCEDURE — 6370000000 HC RX 637 (ALT 250 FOR IP): Performed by: PSYCHIATRY & NEUROLOGY

## 2020-08-16 PROCEDURE — 6370000000 HC RX 637 (ALT 250 FOR IP): Performed by: INTERNAL MEDICINE

## 2020-08-16 PROCEDURE — 1240000000 HC EMOTIONAL WELLNESS R&B

## 2020-08-16 RX ORDER — SERTRALINE HYDROCHLORIDE 100 MG/1
100 TABLET, FILM COATED ORAL DAILY
Status: DISCONTINUED | OUTPATIENT
Start: 2020-08-16 | End: 2020-08-19 | Stop reason: HOSPADM

## 2020-08-16 RX ORDER — LISINOPRIL 5 MG/1
5 TABLET ORAL DAILY
Status: DISCONTINUED | OUTPATIENT
Start: 2020-08-16 | End: 2020-08-19 | Stop reason: HOSPADM

## 2020-08-16 RX ORDER — HYDROXYZINE 50 MG/1
50 TABLET, FILM COATED ORAL 3 TIMES DAILY PRN
Status: DISCONTINUED | OUTPATIENT
Start: 2020-08-16 | End: 2020-08-19 | Stop reason: HOSPADM

## 2020-08-16 RX ORDER — QUETIAPINE FUMARATE 200 MG/1
400 TABLET, FILM COATED ORAL NIGHTLY
Status: DISCONTINUED | OUTPATIENT
Start: 2020-08-16 | End: 2020-08-19 | Stop reason: HOSPADM

## 2020-08-16 RX ORDER — NICOTINE 21 MG/24HR
1 PATCH, TRANSDERMAL 24 HOURS TRANSDERMAL DAILY
Status: DISCONTINUED | OUTPATIENT
Start: 2020-08-16 | End: 2020-08-16

## 2020-08-16 RX ORDER — PANTOPRAZOLE SODIUM 40 MG/1
40 TABLET, DELAYED RELEASE ORAL
Status: DISCONTINUED | OUTPATIENT
Start: 2020-08-17 | End: 2020-08-19 | Stop reason: HOSPADM

## 2020-08-16 RX ORDER — ACETAMINOPHEN 325 MG/1
650 TABLET ORAL EVERY 4 HOURS PRN
Status: DISCONTINUED | OUTPATIENT
Start: 2020-08-16 | End: 2020-08-19 | Stop reason: HOSPADM

## 2020-08-16 RX ADMIN — HYDROXYZINE HYDROCHLORIDE 50 MG: 50 TABLET, FILM COATED ORAL at 11:38

## 2020-08-16 RX ADMIN — TRAZODONE HYDROCHLORIDE 50 MG: 50 TABLET ORAL at 20:45

## 2020-08-16 RX ADMIN — SERTRALINE HYDROCHLORIDE 100 MG: 100 TABLET ORAL at 12:19

## 2020-08-16 RX ADMIN — METFORMIN HYDROCHLORIDE 1000 MG: 500 TABLET ORAL at 08:05

## 2020-08-16 RX ADMIN — LISINOPRIL 5 MG: 5 TABLET ORAL at 12:19

## 2020-08-16 RX ADMIN — GLIMEPIRIDE 4 MG: 4 TABLET ORAL at 08:05

## 2020-08-16 RX ADMIN — METFORMIN HYDROCHLORIDE 1000 MG: 500 TABLET ORAL at 17:49

## 2020-08-16 RX ADMIN — QUETIAPINE FUMARATE 400 MG: 200 TABLET ORAL at 20:45

## 2020-08-16 ASSESSMENT — PAIN SCALES - GENERAL: PAINLEVEL_OUTOF10: 0

## 2020-08-16 ASSESSMENT — SLEEP AND FATIGUE QUESTIONNAIRES
DIFFICULTY STAYING ASLEEP: YES
DIFFICULTY FALLING ASLEEP: YES
DO YOU HAVE DIFFICULTY SLEEPING: YES
RESTFUL SLEEP: NO
DIFFICULTY ARISING: NO
AVERAGE NUMBER OF SLEEP HOURS: 0
DO YOU USE A SLEEP AID: NO

## 2020-08-16 ASSESSMENT — LIFESTYLE VARIABLES: HISTORY_ALCOHOL_USE: NO

## 2020-08-16 NOTE — BH NOTE
BHI Biopsychosocial Assessment     Current Level of Psychosocial Functioning      Independent:   Dependent: X  Minimal Assist:      Comments:  Client has a provisional diagnosis of Major Depressive Disorder, recurrent, severe with psychosis at time of admission; Stimulant-use Disorder, crack cocaine type, recent relapse in January 2020     Psychosocial High Risk Factors (check all that apply)     Unable to obtain meds:   Chronic illness/pain:    Substance abuse:   Lack of Family Support: X  Financial stress:   Isolation: X  Inadequate Community Resources: X  Suicide attempt(s):   Not taking medications: X   Victim of crime:   Developmental Delay:   Unable to manage personal needs:   Age 72 or older:   Homeless:   No transportation:   Readmission within 30 days:   Unemployment:   Traumatic Event:       Psychiatric Advanced Directives: None reported     Family to Involve in Treatment: Client reports no family involvement in treatment; client does list sister as emergency contact (AMAN refused)     Sexual Orientation: Client is currently not in a relationship     Patient Strengths: Safe housing; SSI and food stamps; [address] Medicaid     Patient Barriers: History of inpatient psychiatric admissions; history of cocaine use; poor coping skills and judgment; lacks social support system; non-compliant with medications and outpatient mental health treatment     Opiate/AOD Referral and/or Education:  Client has a history of smoking crack cocaine; no tox screen at time of admission; client is not interested in CD referral for in/outpatient AOD treatment at this time. Client does have a history of sobriety, over 1-year, relapsed in January 2020. CMHC/mental health history: Linked to [facility] [employer]:  Client refuses to discuss safety plan at time of assessment. Writer will continue to make attempts to complete form.      Plan of Care: Medication management, group/individual therapies, family meetings, psycho -education, treatment team meetings to assist with stabilization     Initial Discharge Plan: Stabilize mood and medications; follow-up appointment with [facility]; return to address on faces[place]       Clinical Summary:  Brianna Medrano is a 47-year old -American male who voluntarily brought self to [facility] ED with an increase in depression and auditory hallucinations reporting \"voices in his head were telling him to hurt himself. \"  Client reports stop taking medications 1-week ago and had an increase in depression and suicidal thoughts with a plan to stab himself. Client has a history of inpatient psychiatric admissions and suicide attempts. Client also has a history of being non-compliant with psych meds as well as follow-up appointments for outpatient mental health treatment at [facility]. Client denies homicidal ideations; no current legal issues; no drug or alcohol abuse and no tox screen at time of admission. Client reports he is not sleeping and he has not \"eaten in 3 days\". Client reports he lives alone and isolates, has limited social support systems, and \"prefers to be alone. \"  Client presents with depression AEB sad/flat mood, reduced appetite and sleep disruptions, , loss of interest in pleasurable activities, isolating himself, feeling hopeless and worthless, and an increase in suicidal thoughts for the past week. Client does have safe housing to return to at time of discharge.   Client born and raised in [place]; single; 1 daughter and some contact; mother alive and supportive, father recently ; 9 brothers/2 sisters, limited contact or support; no history of childhood or adulthood abuse; GED; relapse on crack cocaine in 2020 after 1-year of sobriety

## 2020-08-16 NOTE — PLAN OF CARE
Problem: Tobacco Use:  Goal: Inpatient tobacco use cessation counseling participation  Description: Inpatient tobacco use cessation counseling participation  8/16/2020 0946 by Tereza Patino RN  Outcome: Ongoing  Note: Patient given tobacco quitline number 66843895158 at this time, refusing to call at this time, states \" I just dont want to quit now\"- patient given information as to the dangers of long term tobacco use. Continue to reinforce the importance of tobacco cessation. 8/16/2020 0739 by LEXIE Robertson  Outcome: Ongoing  8/15/2020 2208 by Maggy Sloan RN  Outcome: Ongoing  Note: Patient given tobacco quitline number 79643838686 at this time, refusing to call at this time, states \" I just dont want to quit now\"- patient given information as to the dangers of long term tobacco use. Continue to reinforce the importance of tobacco cessation. Problem: Depressive Behavior With or Without Suicide Precautions:  Goal: Able to verbalize and/or display a decrease in depressive symptoms  Description: Able to verbalize and/or display a decrease in depressive symptoms  8/16/2020 0946 by Tereza Patino RN  Outcome: Ongoing  Note: Patient is depressed as evidenced by flat affect, guarded, withdrawn, and isolative. Patient is unmotivated and comes out of his room for needs only. 8/16/2020 0739 by LEXIE Robertson  Outcome: Ongoing  8/15/2020 2208 by Maggy Sloan RN  Outcome: Ongoing  Note: Patient appears depressed this evening aeb patient spending majority of the night in his room. He reports issues with his sleep. He is compliant with medications. Behavior remains flat. He reports fleeting suicidal ideations but is able to contract for safety. Patient also reports auditory hallucinations of non specific nature. Emotional support and reassurance given. Safety maintained per unit policy, including q 46J patient safety checks.   Goal: Ability to disclose and discuss suicidal ideas will improve  Description: Ability to disclose and discuss suicidal ideas will improve  8/16/2020 0946 by Brandie Saenz RN  Outcome: Ongoing  Note: Pt denies thoughts of self harm and is agreeable to seeking out should thoughts of self harm arise. Safe environment maintained. Q15 minute checks for safety cont per unit policy. Will cont to monitor for safety and provides support and reassurance as needed. 8/16/2020 0739 by LEXIE Membreno  Outcome: Ongoing  8/15/2020 2208 by Estella Blancas RN  Outcome: Ongoing  Note: Patient reports fleeting suicidal ideations this shift without a specific plan. He is able to contract for safety and feels safe in the hospital. Encouraged to seek out assistance from staff should thoughts become worse. Will continue to monitor patient for safety and behavior. Goal: Participates in care planning  Description: Participates in care planning  8/16/2020 0946 by Brandie Saenz RN  Outcome: Ongoing  Note: Patient is currently refusing to attend any unit programming, even with much encouragement from staff.  8/16/2020 0739 by LEXIE Membreno  Outcome: Ongoing  8/15/2020 2208 by Estella Blancas RN  Outcome: Ongoing  Note: Patient is minimally active in his care planning this shift. He did not attend unit programming this evening and was seen isolative to himself for most of the shift. He is compliant with all scheduled hs medications. Encouraged to be more social with peers and attend group therapies. Problem: Tobacco Use:  Intervention: Tobacco-use cessation counseling  Note: Patient is currently refusing the medication ordered for smoking cessation. Problem: Depressive Behavior With or Without Suicide Precautions:  Intervention: Assess psychological signs and symptoms of depression  Note: Patient is depressed as evidenced by flat affect, guarded, withdrawn, and isolative. Patient is unmotivated and comes out of his room for needs only.   Intervention: Assess nutritional intake  Note: Patient is eating 100% of meals in the day area. Intervention: Assess behavior for possible injury to self  Note: Pt is currently not attempting to inflict self harm. Intervention: Encourage patient setting realistic goals 1 time per day  Note: Patient's goal for the shift/day is to speak to the MD.  Intervention: Supervise medication intake  Note: Pt is medication compliant, and received meds per drs orders. Safety checks are maintained every 15 minutes, irregular intervals, and shift change.

## 2020-08-16 NOTE — PLAN OF CARE
was seen isolative to himself for most of the shift. He is compliant with all scheduled hs medications. Encouraged to be more social with peers and attend group therapies.

## 2020-08-16 NOTE — BH NOTE
Patient is complaining of feeling anxious and requested her 50mg oral of Atarax per orders. Patient is currently sitting in the day area, and showing no signs of distress. Safety checks are maintained every 15 minutes and at irregular intervals.

## 2020-08-16 NOTE — CONSULTS
[facility] Internal Medicine    CONSULTATION / HISTORY AND PHYSICAL EXAMINATION            Date:   8/15/2020  Patient name:  Susana Brown  Date of admission:  8/15/2020  9:05 AM  MRN:   [id number]  Account:  [de-identified]  YOB: 1965  PCP:    KRISTINA Dawson CNP  Room:   [facility]0226-  Code Status:    Full Code    Physician Requesting Consult: Shreya Wilburn MD    Reason for Consult: Medical management uncontrolled diabetes mellitus    Chief Complaint:     Chief Complaint   Patient presents with    Mental Health Problem   Uncontrolled diabetes mellitus    History Obtained From:     Patient nursing staff medical records    History of Present Illness:     Diabetes   Duration more than 7 years  Modifying factors on Glucophage and other med  Severity uncontrolled sever  Associated signs and symtoms neuropathy/ckd/ CAD. aggravated with sugar diet and better with low sugar diet       HTN  Onset more than 2 years ago  dina mild to mod  Controlled with current po meds  Not associated with headaches or blurry vision  No chest pain        Past Medical History:     Past Medical History:   Diagnosis Date    Acute depression 9/8/2018    Back pain     DM2 (diabetes mellitus, type 2) ([facility] 4/21/2014    Generalized OA     GERD (gastroesophageal reflux disease)     HTN (hypertension)     Schizoaffective disorder, bipolar type ([facility] 3/29/2019    Unspecified sleep apnea     c  pap        Past Surgical History:     Past Surgical History:   Procedure Laterality Date    TONSILLECTOMY AND ADENOIDECTOMY          Medications Prior to Admission:     Prior to Admission medications    Medication Sig Start Date End Date Taking?  Authorizing Provider   lisinopril (PRINIVIL;ZESTRIL) 5 MG tablet Take 5 mg by mouth daily    Historical Provider, MD   metFORMIN (GLUCOPHAGE) 1000 MG tablet Take 1 tablet by mouth 2 times daily (with meals) 3/25/20   Sheron Josue MD   QUEtiapine (SEROQUEL) 400 MG tablet Take 1 tablet by mouth nightly  Patient taking differently: Take 600 mg by mouth nightly  3/24/20   Jasper Cramer MD   traZODone (DESYREL) 50 MG tablet Take 50 mg by mouth nightly as needed for Sleep    Historical Provider, MD   gabapentin (NEURONTIN) 400 MG capsule Take 800 mg by mouth 3 times daily. Historical Provider, MD   pantoprazole (PROTONIX) 40 MG tablet Take 1 tablet by mouth every morning (before breakfast) 1/12/20   Jasper Cramer MD   sertraline (ZOLOFT) 100 MG tablet Take 1 tablet by mouth daily 4/1/19   Carmen Elam MD        Allergies:     Patient has no known allergies. Social History:     Tobacco:    reports that he has been smoking cigarettes. He has a 20.50 pack-year smoking history. He has never used smokeless tobacco.  Alcohol:      reports no history of alcohol use. Drug Use:  reports current drug use. Drugs: Marijuana and Cocaine. Family History:     Family History   Problem Relation Age of Onset    Diabetes Mother        Review of Systems:     Positive and Negative as described in HPI. CONSTITUTIONAL:  negative for fevers, chills, sweats, fatigue, weight loss  HEENT:  negative for vision, hearing changes, runny nose, throat pain  RESPIRATORY:  negative for shortness of breath, cough, congestion, wheezing. CARDIOVASCULAR:  negative for chest pain, palpitations.   GASTROINTESTINAL:  negative for nausea, vomiting, diarrhea, constipation, change in bowel habits, abdominal pain   GENITOURINARY:  negative for difficulty of urination, burning with urination, frequency   INTEGUMENT:  negative for rash, skin lesions, easy bruising   HEMATOLOGIC/LYMPHATIC:  negative for swelling/edema   ALLERGIC/IMMUNOLOGIC:  negative for urticaria , itching  ENDOCRINE:  negative increase in drinking, increase in urination, hot or cold intolerance  MUSCULOSKELETAL:  negative joint pains, muscle aches, swelling of joints  NEUROLOGICAL:  negative for headaches, dizziness, lightheadedness, numbness, pain, tingling extremities      Physical Exam:     /79   Pulse 103   Temp 98.1 °F (36.7 °C) (Oral)   Resp 14   Ht 5' 5\" (1.651 m)   Wt 235 lb (106.6 kg)   SpO2 99%   BMI 39.11 kg/m²   Temp (24hrs), Av.5 °F (36.9 °C), Min:98.1 °F (36.7 °C), Max:98.9 °F (37.2 °C)    Recent Labs     08/15/20  1434 08/15/20  1705 08/15/20  1958   POCGLU 447* 403* 436*     No intake or output data in the 24 hours ending 08/15/20 2021    General Appearance:  alert, well appearing, and in no acute distress  Mental status: oriented to person, place, and time with normal affect  Head:  normocephalic, atraumatic. Eye: no icterus, redness, pupils equal and reactive, extraocular eye movements intact, conjunctiva clear  Ear: normal external ear, no discharge, hearing intact  Nose:  no drainage noted  Mouth: mucous membranes moist  Neck: supple, no carotid bruits, thyroid not palpable  Lungs: Bilateral equal air entry, clear to ausculation, no wheezing, rales or rhonchi, normal effort  Cardiovascular: normal rate, regular rhythm, no murmur, gallop, rub. Abdomen: Soft, nontender, nondistended, normal bowel sounds, no hepatomegaly or splenomegaly  Neurologic: There are no new focal motor or sensory deficits, normal muscle tone and bulk, no abnormal sensation, normal speech, cranial nerves II through XII grossly intact  Skin: No gross lesions, rashes, bruising or bleeding on exposed skin area  Extremities:  peripheral pulses palpable, no pedal edema or calf pain with palpation      Investigations:      Laboratory Testing:  Recent Results (from the past 24 hour(s))   COVID-19    Collection Time: 08/15/20 10:36 AM    Specimen: Other   Result Value Ref Range    SARS-CoV-2          SARS-CoV-2, Rapid Not Detected Not Detected    Source . NASOPHARYNGEAL SWAB     SARS-CoV-2, PCR         POC Glucose Fingerstick    Collection Time: 08/15/20  2:34 PM   Result Value Ref Range    POC Glucose 447 (HH) 75 - 110 mg/dL CBC Auto Differential    Collection Time: 08/15/20  3:48 PM   Result Value Ref Range    WBC 5.0 3.5 - 11.0 k/uL    RBC 5.15 4.5 - 5.9 m/uL    Hemoglobin 15.4 13.5 - 17.5 g/dL    Hematocrit 44.5 41 - 53 %    MCV 86.3 80 - 100 fL    MCH 29.8 26 - 34 pg    MCHC 34.6 31 - 37 g/dL    RDW 14.6 11.5 - 14.9 %    Platelets 024 301 - 961 k/uL    MPV 7.6 6.0 - 12.0 fL    NRBC Automated NOT REPORTED per 100 WBC    Differential Type NOT REPORTED     Seg Neutrophils 50 36 - 66 %    Lymphocytes 34 24 - 44 %    Monocytes 9 (H) 1 - 7 %    Eosinophils % 6 (H) 0 - 4 %    Basophils 1 0 - 2 %    Immature Granulocytes NOT REPORTED 0 %    Segs Absolute 2.50 1.3 - 9.1 k/uL    Absolute Lymph # 1.70 1.0 - 4.8 k/uL    Absolute Mono # 0.50 0.1 - 1.3 k/uL    Absolute Eos # 0.30 0.0 - 0.4 k/uL    Basophils Absolute 0.00 0.0 - 0.2 k/uL    Absolute Immature Granulocyte NOT REPORTED 0.00 - 0.30 k/uL    WBC Morphology NOT REPORTED     RBC Morphology NOT REPORTED     Platelet Estimate NOT REPORTED    Basic Metabolic Panel    Collection Time: 08/15/20  3:48 PM   Result Value Ref Range    Glucose 359 (H) 70 - 99 mg/dL    BUN 12 6 - 20 mg/dL    CREATININE 0.98 0.70 - 1.20 mg/dL    Bun/Cre Ratio NOT REPORTED 9 - 20    Calcium 8.9 8.6 - 10.4 mg/dL    Sodium 132 (L) 135 - 144 mmol/L    Potassium 4.3 3.7 - 5.3 mmol/L    Chloride 99 98 - 107 mmol/L    CO2 24 20 - 31 mmol/L    Anion Gap 9 9 - 17 mmol/L    GFR Non-African American >60 >60 mL/min    GFR African American >60 >60 mL/min    GFR Comment          GFR Staging NOT REPORTED    POC Glucose Fingerstick    Collection Time: 08/15/20  5:05 PM   Result Value Ref Range    POC Glucose 403 (HH) 75 - 110 mg/dL   POC Glucose Fingerstick    Collection Time: 08/15/20  7:58 PM   Result Value Ref Range    POC Glucose 436 (HH) 75 - 110 mg/dL       Imaging/Diagonstics:      Assessment :      Primary Problem  <principal problem not specified>    Active Hospital Problems    Diagnosis Date Noted    Schizophrenia ([facility] [F20.9] 08/15/2020    Dyslipidemia [E78.5] 04/21/2014    [facility], [facility] [E11.9] 04/21/2014    Smoking [F17.200] 04/21/2014    GERD (gastroesophageal reflux disease) [K21.9] 07/12/2012       Plan:     1. Uncontrolled diabetes mellitus noncompliance not has been taking medication restart metformin 1000 twice a day  Add glimepiride to 4 mg at bedtime consult for better diet  Lifestyle changes    Class II obesity BMI 39.11     consultations:   IP CONSULT TO INTERNAL MEDICINE  IP CONSULT TO HISTORY AND PHYSICAL      Mariann Bob MD  8/15/2020  8:21 PM    Copy sent to Dr. Harshal Inman, APRN - CNP    Please note that this chart was generated using voice recognition Dragon dictation software. Although every effort was made to ensure the accuracy of this automated transcription, some errors in transcription may have occurred.

## 2020-08-16 NOTE — GROUP NOTE
Group Therapy Note    Date: 8/16/2020    Group Start Time: 1330  Group End Time: 1430  Group Topic: Cognitive Skills    INES Barahona, CTRS        Group Therapy Note    Attendees: 7/19       Pt did not participate in Cognitive Skills Group at 1330 when encouraged by RT due to resting in room. Pt was offered talk time as an alternative to group but declined.        Discipline Responsible: Psychoeducational Specialist      Signature:  Jayro Velez

## 2020-08-16 NOTE — BH NOTE
patient refused to attend wrap up and relaxation group at 2030 after encouragement from staff. 1:1 talk time provided as alternative to group session.

## 2020-08-16 NOTE — PROGRESS NOTES
Department of Psychiatry  Attending Physician Psychiatric Assessment     CHIEF COMPLAINT: Suicidal ideations     History obtained from:  patient, electronic medical record    HISTORY OF PRESENT ILLNESS:    Charleen Tarn is a 54 y.o. male who presented to the ED With suicidal ideations to stab himself for the past 2 weeks but he said he has been having suicidal ideations off and on all his life. His depression increased lately due to the social changes from the COVID-19 pandemic. He said he has not been able to see his family and has been more depressed. The patient described history of feeling worthless, hopeless, anhedonia, poor energy, poor sleep, poor appetite and suicidal ideations associated with the depression. He has a hallucinations telling him to stop his medications which he did and to stab himself. He said Seroquel and Zoloft helped. No history of janae or visual hallucinations or delusions or panic attacks. He denied any history of substance addiction but reported using occasionally many years ago. PAST PSYCHIATRIC HISTORY:    The patient has history of multiple psychiatric hospitalizations in the past.  The patient has not attempted suicide in the past per his report.       Past Medical History:        Diagnosis Date    Acute depression 9/8/2018    Back pain     DM2 (diabetes mellitus, type 2) ([facility] 4/21/2014    Generalized OA     GERD (gastroesophageal reflux disease)     HTN (hypertension)     [facility] 3/[facility]/2019    Unspecified sleep apnea     c  pap       Past Surgical History:        Procedure Laterality Date    TONSILLECTOMY AND ADENOIDECTOMY         Medications Prior to Admission:   Medications Prior to Admission: lisinopril (PRINIVIL;ZESTRIL) 5 MG tablet, Take 5 mg by mouth daily  metFORMIN (GLUCOPHAGE) 1000 MG tablet, Take 1 tablet by mouth 2 times daily (with meals)  QUEtiapine (SEROQUEL) 400 MG tablet, Take 1 tablet by mouth nightly (Patient taking differently: Take 600 mg by mouth nightly )  [DISCONTINUED] buPROPion (WELLBUTRIN XL) 150 MG extended release tablet, Take 1 tablet by mouth every morning  traZODone (DESYREL) 50 MG tablet, Take 50 mg by mouth nightly as needed for Sleep  gabapentin (NEURONTIN) 400 MG capsule, Take 800 mg by mouth 3 times daily. [DISCONTINUED] glimepiride (AMARYL) 4 MG tablet, Take 4 mg by mouth every morning (before breakfast)  [DISCONTINUED] cyclobenzaprine (FLEXERIL) 10 MG tablet, Take 10 mg by mouth 2 times daily as needed for Muscle spasms  [DISCONTINUED] amLODIPine (NORVASC) 5 MG tablet, Take 5 mg by mouth daily  [DISCONTINUED] hydrocortisone 2.5 % cream, Apply 1 Dose topically 2 times daily as needed Apply topically 2 times daily. pantoprazole (PROTONIX) 40 MG tablet, Take 1 tablet by mouth every morning (before breakfast)  [DISCONTINUED] hydrOXYzine (ATARAX) 25 MG tablet, Take 25 mg by mouth 2 times daily as needed for Anxiety  sertraline (ZOLOFT) 100 MG tablet, Take 1 tablet by mouth daily  [DISCONTINUED] oxyCODONE-acetaminophen (PERCOCET)  MG per tablet, Take 1 tablet by mouth every 6 hours as needed for Pain. Prescribed by pain management Dr. Marlene Pierce, 120 tabs given on 2 /26/2020    Allergies:  Patient has no known allergies. Family History:   Multiple cousins with bipolar disorder and schizophrenia      Psychosocial History:    He was raised by his mother. There was 10 siblings. No abuse in childhood. He was in special educational classes in school but graduated successfully. He worked in [employer]. He spent 8 months in prison for [employer] in [place]. He was never . He has a 80-year-old daughter. Problem Relation Age of Onset    Diabetes Mother          PHYSICAL EXAM:  Vitals:  /78   Pulse 97   Temp 97.4 °F (36.3 °C) (Oral)   Resp 14   Ht 5' 5\" (1.651 m)   Wt 235 lb (106.6 kg)   SpO2 99%   BMI 39.11 kg/m²     Cranial nerves 1-12 grossly intact.     See H&P for more physical exam.      MENTAL STATUS EXAM  Level of consciousness:  within normal limits  Appearance:  well-appearing  Behavior/Motor:  psychomotor retardation  Attitude toward examiner:  cooperative  Speech:  Low rate and volume and spontaneous  Mood:  depressed  Affect:  mood congruent, Constricted in range. Thought processes:  linear  Thought content: suicidal and auditory hallucinations  Cognition:  oriented to person, place, and time  Memory: intact  Insight & Judgement: limited or impaired. Recent Results (from the past 67 hour(s))   COVID-19    Collection Time: 08/15/20 10:36 AM    Specimen: Other   Result Value Ref Range    SARS-CoV-2          SARS-CoV-2, Rapid Not Detected Not Detected    Source . NASOPHARYNGEAL SWAB     SARS-CoV-2, PCR         POC Glucose Fingerstick    Collection Time: 08/15/20  2:34 PM   Result Value Ref Range    POC Glucose 447 (HH) 75 - 110 mg/dL   CBC Auto Differential    Collection Time: 08/15/20  3:48 PM   Result Value Ref Range    WBC 5.0 3.5 - 11.0 k/uL    RBC 5.15 4.5 - 5.9 m/uL    Hemoglobin 15.4 13.5 - 17.5 g/dL    Hematocrit 44.5 41 - 53 %    MCV 86.3 80 - 100 fL    MCH 29.8 26 - 34 pg    MCHC 34.6 31 - 37 g/dL    RDW 14.6 11.5 - 14.9 %    Platelets 330 112 - 600 k/uL    MPV 7.6 6.0 - 12.0 fL    NRBC Automated NOT REPORTED per 100 WBC    Differential Type NOT REPORTED     Seg Neutrophils 50 36 - 66 %    Lymphocytes 34 24 - 44 %    Monocytes 9 (H) 1 - 7 %    Eosinophils % 6 (H) 0 - 4 %    Basophils 1 0 - 2 %    Immature Granulocytes NOT REPORTED 0 %    Segs Absolute 2.50 1.3 - 9.1 k/uL    Absolute Lymph # 1.70 1.0 - 4.8 k/uL    Absolute Mono # 0.50 0.1 - 1.3 k/uL    Absolute Eos # 0.30 0.0 - 0.4 k/uL    Basophils Absolute 0.00 0.0 - 0.2 k/uL    Absolute Immature Granulocyte NOT REPORTED 0.00 - 0.30 k/uL    WBC Morphology NOT REPORTED     RBC Morphology NOT REPORTED     Platelet Estimate NOT REPORTED    Basic Metabolic Panel    Collection Time: 08/15/20  3:48 PM   Result Value verified. Risk Management:  close watch per standard protocol      Psychotherapy:  participation in milieu and group and individual sessions with Attending Physician,  and Physician Assistant/CNP    Reason for Admission to Psychiatric Unit:  Threat to self requiring 24 hour professional observation      Estimated length of stay:  5-10 days      GENERAL PATIENT/FAMILY EDUCATION  Patient will understand basic signs and symptoms, Patient will understand benefits/risks and potential side effects from proposed meds and Patient will understand their role in recovery. Family is  active in patient's care. Patient assets that may be helpful during treatment include: Intent to participate and engage in treatment, sufficient fund of knowledge and intellect to understand and utilize treatments. Liat Pike is a 54 y.o. male being evaluated by a Virtual Visit (video visit) encounter to address concerns as mentioned above. A caregiver was present when appropriate. Due to this being a TeleHealth encounter (During YSEOZ-05 public health emergency), evaluation of the following organ systems was limited: Vitals/Constitutional/EENT/Resp/CV/GI//MS/Neuro/Skin/Heme-Lymph-Imm. Pursuant to the emergency declaration under the [address], [facility] authority and the [employer] and [employer] Act, this Virtual Visit was conducted with patient's (and/or legal guardian's) consent, to reduce the risk of exposure to COVID-19 and provide necessary medical care. Video conference platform used: Jeri. Total time spent on this encounter: Not billed by time    Services were provided through a video synchronous discussion virtually to substitute for in-person encounter. --Eliz Meza MD on 8/16/2020 at 3:54 PM    An electronic signature was used to authenticate this note. Patient location: [facility].   This doctor's location: [facility]     Physicians Signature:  Electronically signed by Shyann Back MD, on 8/16/2020 at 3:54 PM

## 2020-08-16 NOTE — PLAN OF CARE
[facility]  Initial Interdisciplinary Treatment Plan NO      Original treatment plan Date & Time: 8/16/2020                   739AM    Admission Type:  Admission Type: Involuntary    Reason for admission:   Reason for Admission: SI no plan    Estimated Length of Stay:  5-7days  Estimated Discharge Date: to be determined by physician    PATIENT STRENGTHS:  Patient Strengths:Strengths: Positive Support, No significant Physical Illness  Patient Strengths and Limitations:Limitations: Tendency to isolate self, Lacks leisure interests, Difficulty problem solving/relies on others to help solve problems, Hopeless about future, Multiple barriers to leisure interests, Inappropriate/potentially harmful leisure interests (depression substance abuse anxiety poor coping skills)  Addictive Behavior: Addictive Behavior  In the past 3 months, have you felt or has someone told you that you have a problem with:  : None  Do you have a history of Chemical Use?: No  Do you have a history of Alcohol Use?: No  Do you have a history of Street Drug Abuse?: Yes  Histroy of Prescripton Drug Abuse?: No  Medical Problems:No past medical history on file.   Status EXAM:Status and Exam  Normal: No  Facial Expression: Elevated  Affect: Inappropriate  Level of Consciousness: Alert  Mood:Normal: No  Mood: Depressed, Anxious  Motor Activity:Normal: No  Motor Activity: Decreased  Interview Behavior: Cooperative  Preception: [place] to Person, Richard Medrano to Time, [place] to Place, [place] to Situation  Attention:Normal: Yes  Attention: Distractible  Thought Processes: Circumstantial  Thought Content:Normal: Yes  Thought Content: Preoccupations  Hallucinations: None  Delusions: No  Memory:Normal: Yes  Memory: Poor Recent, Confabulation  Insight and Judgment: No  Insight and Judgment: Unmotivated  Present Suicidal Ideation: No  Present Homicidal Ideation: No    EDUCATION:   Learner Progress Toward Treatment Goals: reviewed group plans and strategies for care    Method:group therapy, medication compliance, individualized assessments and care planning    Outcome: needs reinforcement    PATIENT GOALS: to be discussed with patient within 72 hours    PLAN/TREATMENT RECOMMENDATIONS:     continue group therapy , medications compliance, goal setting, individualized assessments and care, continue to monitor pt on unit      SHORT-TERM GOALS:   Time frame for Short-Term Goals: 5-7 days    LONG-TERM GOALS:  Time frame for Long-Term Goals: 6 months  Members Present in Team Meeting: See Signature Sheet    Stefan Mireles

## 2020-08-16 NOTE — BH NOTE
1:1 interview was done via Telehealth by Dr Lisa Suarez. States he started having suicidal ideation about 2 weeks ago & decided to come to the hospital. States he has increased depression r/t Covid-19 issues & restrictions. States poor sleep, frequent awakening. Hears voices that tell him to stab himself. Discussed medications.

## 2020-08-16 NOTE — BH NOTE
On call Annette NICHOLSON, contacted and notified of patient's critical blood sugar of 436. Per Dr. Leary Do he will continue with current medication regimen and does not want to add any additional orders at this time. Also, MD would not like to be contacted unless blood sugar is above 600. Will continue to monitor patient for safety and behavior.

## 2020-08-17 PROBLEM — F25.1 SCHIZOAFFECTIVE DISORDER, DEPRESSIVE TYPE (HCC): Status: ACTIVE | Noted: 2020-01-06

## 2020-08-17 LAB
GLUCOSE BLD-MCNC: 140 MG/DL (ref 75–110)
GLUCOSE BLD-MCNC: 234 MG/DL (ref 75–110)
GLUCOSE BLD-MCNC: 238 MG/DL (ref 75–110)
GLUCOSE BLD-MCNC: 238 MG/DL (ref 75–110)

## 2020-08-17 PROCEDURE — 1240000000 HC EMOTIONAL WELLNESS R&B

## 2020-08-17 PROCEDURE — 99232 SBSQ HOSP IP/OBS MODERATE 35: CPT | Performed by: INTERNAL MEDICINE

## 2020-08-17 PROCEDURE — 6370000000 HC RX 637 (ALT 250 FOR IP): Performed by: NURSE PRACTITIONER

## 2020-08-17 PROCEDURE — 6370000000 HC RX 637 (ALT 250 FOR IP): Performed by: PSYCHIATRY & NEUROLOGY

## 2020-08-17 PROCEDURE — 82947 ASSAY GLUCOSE BLOOD QUANT: CPT

## 2020-08-17 PROCEDURE — 6370000000 HC RX 637 (ALT 250 FOR IP): Performed by: INTERNAL MEDICINE

## 2020-08-17 PROCEDURE — 99232 SBSQ HOSP IP/OBS MODERATE 35: CPT | Performed by: PSYCHIATRY & NEUROLOGY

## 2020-08-17 RX ORDER — NICOTINE POLACRILEX 4 MG
15 LOZENGE BUCCAL PRN
Status: DISCONTINUED | OUTPATIENT
Start: 2020-08-17 | End: 2020-08-17 | Stop reason: SDUPTHER

## 2020-08-17 RX ORDER — GLIMEPIRIDE 4 MG/1
4 TABLET ORAL 2 TIMES DAILY
Status: DISCONTINUED | OUTPATIENT
Start: 2020-08-17 | End: 2020-08-19 | Stop reason: HOSPADM

## 2020-08-17 RX ORDER — DEXTROSE MONOHYDRATE 50 MG/ML
100 INJECTION, SOLUTION INTRAVENOUS PRN
Status: DISCONTINUED | OUTPATIENT
Start: 2020-08-17 | End: 2020-08-17 | Stop reason: SDUPTHER

## 2020-08-17 RX ORDER — DEXTROSE MONOHYDRATE 25 G/50ML
12.5 INJECTION, SOLUTION INTRAVENOUS PRN
Status: DISCONTINUED | OUTPATIENT
Start: 2020-08-17 | End: 2020-08-17 | Stop reason: SDUPTHER

## 2020-08-17 RX ORDER — GABAPENTIN 400 MG/1
800 CAPSULE ORAL 3 TIMES DAILY
Status: DISCONTINUED | OUTPATIENT
Start: 2020-08-17 | End: 2020-08-19 | Stop reason: HOSPADM

## 2020-08-17 RX ADMIN — SERTRALINE HYDROCHLORIDE 100 MG: 100 TABLET ORAL at 08:20

## 2020-08-17 RX ADMIN — GLIMEPIRIDE 4 MG: 4 TABLET ORAL at 17:22

## 2020-08-17 RX ADMIN — QUETIAPINE FUMARATE 400 MG: 200 TABLET ORAL at 20:46

## 2020-08-17 RX ADMIN — LISINOPRIL 5 MG: 5 TABLET ORAL at 08:20

## 2020-08-17 RX ADMIN — METFORMIN HYDROCHLORIDE 1000 MG: 500 TABLET ORAL at 08:20

## 2020-08-17 RX ADMIN — GLIMEPIRIDE 4 MG: 4 TABLET ORAL at 08:20

## 2020-08-17 RX ADMIN — METFORMIN HYDROCHLORIDE 1000 MG: 500 TABLET ORAL at 17:22

## 2020-08-17 RX ADMIN — TRAZODONE HYDROCHLORIDE 50 MG: 50 TABLET ORAL at 20:46

## 2020-08-17 RX ADMIN — GABAPENTIN 800 MG: 400 CAPSULE ORAL at 13:28

## 2020-08-17 RX ADMIN — GABAPENTIN 800 MG: 400 CAPSULE ORAL at 20:46

## 2020-08-17 ASSESSMENT — PAIN SCALES - GENERAL: PAINLEVEL_OUTOF10: 0

## 2020-08-17 NOTE — PLAN OF CARE
Problem: Depressive Behavior With or Without Suicide Precautions:  Goal: Able to verbalize and/or display a decrease in depressive symptoms  Description: Able to verbalize and/or display a decrease in depressive symptoms  8/16/2020 2241 by Kj Land RN  Outcome: Ongoing  Note: Patient is withdraw, depressed and is isolative to room during this shift. Patient is medication compliant and behavior controlled during this shift. Patient denies suicidal ideation and thoughts of self harm. Patient agreeable to seek out staff in changes in thoughts arise. Staff continues to encourage patient to be an active part of her own recovery and staff maintains Q 15 minute safety checks.      Problem: Depressive Behavior With or Without Suicide Precautions:  Goal: Ability to disclose and discuss suicidal ideas will improve  Description: Ability to disclose and discuss suicidal ideas will improve  8/16/2020 2241 by Kj Land RN  Outcome: Ongoing     Problem: Depressive Behavior With or Without Suicide Precautions:  Goal: Participates in care planning  Description: Participates in care planning  8/16/2020 2241 by Kj Land RN  Outcome: Ongoing

## 2020-08-17 NOTE — GROUP NOTE
Group Therapy Note    Date: 8/17/2020    Group Start Time: 1100  Group End Time: 36  Group Topic: Psychotherapy    [facility]    Karen Martini        Group Therapy Note         Patient refused to attend psychotherapy group after encouragement from staff. 1:1 talk time offered but refused. Signature:   Karen Martini

## 2020-08-17 NOTE — PROGRESS NOTES
BEHAVIORAL HEALTH FOLLOW-UP NOTE     8/17/2020     Patient was seen and examined in person, Chart reviewed   Patient's case discussed with staff/team    Chief Complaint: Psychosis. Interim History:     Noted that the patient had presented with auditory command hallucinations telling him not to take his medications and to stab himself. It appears that the patient has not been taking his medication prior to admission. Patient seen using telehealth. He has been shaky off and on. No drugs or alcohol (but see below). Medications have been helping. The voices never completely go away. He was taking his medication till about 2 weeks. .     Narcotic seeking. Says he was prescribed those before. Noted that there are no prescriptions since March of this year. Says he has been buying off the streets recently.      Appetite: *  [x] Normal/Unchanged  [] Increased  [] Decreased      Sleep:       [] Normal/Unchanged  [x] Fair       [] Poor              Energy:    [] Normal/Unchanged  [] Increased  [x] Decreased        Aggression:  [] yes  [x] no    Patient is [x] able  [] unable to CONTRACT FOR SAFETY ON THE UNIT    PAST MEDICAL/PSYCHIATRIC HISTORY:   Past Medical History:   Diagnosis Date    Acute depression 9/8/2018    Back pain     DM2 (diabetes mellitus, type 2) ([facility] [address] 4/21/2014    Generalized OA     GERD (gastroesophageal reflux disease)     HTN (hypertension)     Schizoaffective disorder, bipolar type ([facility] 3/29/2019    Unspecified sleep apnea     c  pap       FAMILY/SOCIAL HISTORY:  Family History   Problem Relation Age of Onset    Diabetes Mother      Social History     Socioeconomic History    Marital status: Single     Spouse name: Not on file    Number of children: Not on file    Years of education: Not on file    Highest education level: Not on file   Occupational History    Not on file   Social Needs    Financial resource strain: Not on file    Food insecurity     Worry: Not on file Inability: Not on file    Transportation needs     Medical: Not on file     Non-medical: Not on file   Tobacco Use    Smoking status: Current Every Day Smoker     Packs/day: 0.50     Years: 41.00     Pack years: 20.50     Types: Cigarettes    Smokeless tobacco: Never Used   Substance and Sexual Activity    Alcohol use: No    Drug use: Yes     Types: Marijuana, Cocaine     Comment: stopped in 2000; Pt reports crack/cocaine use on an every other day basis    Sexual activity: Not on file   Lifestyle    Physical activity     Days per week: Not on file     Minutes per session: Not on file    Stress: Not on file   Relationships    Social connections     Talks on phone: Not on file     Gets together: Not on file     Attends Jehovah's witness service: Not on file     Active member of club or organization: Not on file     Attends meetings of clubs or organizations: Not on file     Relationship status: Not on file    Intimate partner violence     Fear of current or ex partner: Not on file     Emotionally abused: Not on file     Physically abused: Not on file     Forced sexual activity: Not on file   Other Topics Concern    Not on file   Social History Narrative    Not on file           ROS:  [x] All negative/unchanged except if checked.  Explain positive(checked items) below:  [] Constitutional  [] Eyes  [] Ear/Nose/Mouth/Throat  [] Respiratory  [] CV  [] GI  []   [] Musculoskeletal  [] Skin/Breast  [] Neurological  [] Endocrine  [] Heme/Lymph  [] Allergic/Immunologic    Explanation:     MEDICATIONS:    Current Facility-Administered Medications:     hydrOXYzine (ATARAX) tablet 50 mg, 50 mg, Oral, TID PRN, Angelo Mckeon MD, 50 mg at 08/16/20 1138    lisinopril (PRINIVIL;ZESTRIL) tablet 5 mg, 5 mg, Oral, Daily, Mis Esteves MD, 5 mg at 08/17/20 0820    pantoprazole (PROTONIX) tablet 40 mg, 40 mg, Oral, QAM , Mis Esteves MD    QUEtiapine (SEROQUEL) tablet 400 mg, 400 mg, Oral, Nightly, Leidy Cox, MD, 400 mg at 08/16/20 2045    sertraline (ZOLOFT) tablet 100 mg, 100 mg, Oral, Daily, Sj Fu MD, 100 mg at 08/17/20 0820    acetaminophen (TYLENOL) tablet 650 mg, 650 mg, Oral, Q4H PRN, Sj Fu MD    bisacodyl (DULCOLAX) EC tablet 5 mg, 5 mg, Oral, Daily PRN, Sj Fu MD    metFORMIN (GLUCOPHAGE) tablet 1,000 mg, 1,000 mg, Oral, BID WC, Jaclyn Ybarra MD, 1,000 mg at 08/17/20 0820    glimepiride (AMARYL) tablet 4 mg, 4 mg, Oral, Daily, Jaclyn Ybarra MD, 4 mg at 08/17/20 0820    glucose (GLUTOSE) 40 % oral gel 15 g, 15 g, Oral, PRN, Jaclyn Ybarra MD    dextrose 50 % IV solution, 12.5 g, Intravenous, PRN, Jaclyn Ybarra MD    glucagon (rDNA) injection 1 mg, 1 mg, Intramuscular, PRN, Jaclyn Ybarra MD    dextrose 5 % solution, 100 mL/hr, Intravenous, PRN, Jaclyn Ybarra MD    traZODone (DESYREL) tablet 50 mg, 50 mg, Oral, Nightly PRN, KRISTINA Wallace CNP, 50 mg at 08/16/20 2045    benztropine mesylate (COGENTIN) injection 2 mg, 2 mg, Intramuscular, BID PRN, KRISTINA Bateman CNP    magnesium hydroxide (MILK OF MAGNESIA) 400 MG/5ML suspension 30 mL, 30 mL, Oral, Daily PRN, KRISTINA Bateamn CNP    aluminum & magnesium hydroxide-simethicone (MAALOX) 200-200-20 MG/5ML suspension 15 mL, 15 mL, Oral, Q6H PRN, KRISTINA Bateman CNP      Examination:  BP (!) 139/90   Pulse 92   Temp 97.3 °F (36.3 °C) (Oral)   Resp 14   Ht 5' 5\" (1.651 m)   Wt 235 lb (106.6 kg)   SpO2 99%   BMI 39.11 kg/m²   Gait - steady  Medication side effects(SE): none    Mental Status Examination:    Level of consciousness:  within normal limits   Appearance:  fair grooming and fair hygiene  Behavior/Motor:  psychomotor retardation  Attitude toward examiner:  cooperative  Speech:  slow   Mood: constricted  Affect:  blunted  Thought processes:  goal directed and coherent   Thought content:  Homicidal ideation - none  Suicidal Ideation: passive  Delusions:  no evidence of delusions  Perceptual Disturbance:  auditory and command  Cognition:  oriented to person, place, and time   Concentration intact  Insight poor   Judgement poor     ASSESSMENT:   Patient symptoms are:  [] Well controlled  [] Improving  [] Worsening  [x] No change      Diagnosis: Schizoaffective disorder depressive type      LABS:    Recent Labs     08/15/20  1548   WBC 5.0   HGB 15.4        Recent Labs     08/15/20  1548   *   K 4.3   CL 99   CO2 24   BUN 12   CREATININE 0.98   GLUCOSE 359*     No results for input(s): BILITOT, ALKPHOS, AST, ALT in the last 72 hours. Lab Results   Component Value Date    BARBSCNU NEGATIVE 03/29/2019    LABBENZ NEGATIVE 03/29/2019    LABBENZ NEGATIVE 12/23/2012    LABMETH NEGATIVE 03/29/2019    PPXUR NOT REPORTED 03/29/2019     Lab Results   Component Value Date    TSH 1.22 03/09/2020     No results found for: LITHIUM  No results found for: VALPROATE, CBMZ    RISK ASSESSMENT: Moderate risk of suicide. Low risk of harm to others    Treatment Plan:  Reviewed current Medications with the patient. No changes    Risks, benefits, side effects, drug-to-drug interactions and alternatives to treatment were discussed. The patient  consented to treatment. Encourage patient to attend group and other milieu activities. Discharge planning discussed with the patient and treatment team.    PSYCHOTHERAPY/COUNSELING:  [] Therapeutic interview  [x] Supportive  [] CBT  [] Ongoing  [] Other    [x] Patient continues to need, on a daily basis, active treatment furnished directly by or requiring the supervision of inpatient psychiatric personnel      Anticipated Length of stay:3-5 days. Jd Samuel is a 54 y.o. male being evaluated by a Virtual Visit (video visit) encounter to address concerns as mentioned above. A caregiver was present in the room along with the patient.  Pursuant to the emergency declaration under the [facility] waiver authority and the [employer] Act, this Virtual Visit was conducted with patient's (and/or legal guardian's) consent, to reduce the patient's risk of exposure to COVID-19 and provide necessary medical care. Services were provided through a video synchronous discussion virtually to substitute for in-person visit by provider. Patient is present at [facility]  and I am physically present at my home in [address] [place] [address], [facility] [address], [place] on 8/17/2020 at 12:20 PM    An electronic signature was used to authenticate this note. **This report has been created using voice recognition software. It may contain minor errors which are inherent in voice recognition technology. **

## 2020-08-17 NOTE — CONSULTS
[facility] Internal Medicine    CONSULTATION / HISTORY AND PHYSICAL EXAMINATION            Date:   8/17/2020  Patient name:  Pete Ortega  Date of admission:  8/15/2020  9:05 AM  MRN:   [id number]  Account:  [de-identified]  YOB: 1965  PCP:    KRISTINA Calderon CNP  Room:   [facility]6-  Code Status:    Full Code    Physician Requesting Consult: Michel Dhillon MD    Reason for Consult: Medical management uncontrolled diabetes mellitus    Chief Complaint:     Chief Complaint   Patient presents with    Mental Health Problem   Uncontrolled diabetes mellitus    History Obtained From:     Patient nursing staff medical records    History of Present Illness:     Diabetes   Duration more than 7 years  Modifying factors on Glucophage and other med  Severity uncontrolled sever  Associated signs and symtoms neuropathy/ckd/ CAD. aggravated with sugar diet and better with low sugar diet       HTN  Onset more than 2 years ago  dina mild to mod  Controlled with current po meds  Not associated with headaches or blurry vision  No chest pain        Past Medical History:     Past Medical History:   Diagnosis Date    Acute depression 9/8/2018    Back pain     DM2 (diabetes mellitus, type 2) ([facility] [address] 4/21/2014    Generalized OA     GERD (gastroesophageal reflux disease)     HTN (hypertension)     Schizoaffective disorder, bipolar type ([facility] [address] 3/29/2019    Unspecified sleep apnea     c  pap        Past Surgical History:     Past Surgical History:   Procedure Laterality Date    TONSILLECTOMY AND ADENOIDECTOMY          Medications Prior to Admission:     Prior to Admission medications    Medication Sig Start Date End Date Taking?  Authorizing Provider   lisinopril (PRINIVIL;ZESTRIL) 5 MG tablet Take 5 mg by mouth daily    Historical Provider, MD   metFORMIN (GLUCOPHAGE) 1000 MG tablet Take 1 tablet by mouth 2 times daily (with meals) 3/25/20   Caroline Rubin MD   QUEtiapine (SEROQUEL) 400 MG tablet Take 1 tablet by mouth nightly  Patient taking differently: Take 600 mg by mouth nightly  3/24/20   Susana Young MD   traZODone (DESYREL) 50 MG tablet Take 50 mg by mouth nightly as needed for Sleep    Historical Provider, MD   gabapentin (NEURONTIN) 400 MG capsule Take 800 mg by mouth 3 times daily. Historical Provider, MD   pantoprazole (PROTONIX) 40 MG tablet Take 1 tablet by mouth every morning (before breakfast) 1/12/20   Susana Young MD   sertraline (ZOLOFT) 100 MG tablet Take 1 tablet by mouth daily 4/1/19   Meryl Falcon MD        Allergies:     Patient has no known allergies. Social History:     Tobacco:    reports that he has been smoking cigarettes. He has a 20.50 pack-year smoking history. He has never used smokeless tobacco.  Alcohol:      reports no history of alcohol use. Drug Use:  reports current drug use. Drugs: Marijuana and Cocaine. Family History:     Family History   Problem Relation Age of Onset    Diabetes Mother        Review of Systems:     Positive and Negative as described in HPI. CONSTITUTIONAL:  negative for fevers, chills, sweats, fatigue, weight loss  HEENT:  negative for vision, hearing changes, runny nose, throat pain  RESPIRATORY:  negative for shortness of breath, cough, congestion, wheezing. CARDIOVASCULAR:  negative for chest pain, palpitations.   GASTROINTESTINAL:  negative for nausea, vomiting, diarrhea, constipation, change in bowel habits, abdominal pain   GENITOURINARY:  negative for difficulty of urination, burning with urination, frequency   INTEGUMENT:  negative for rash, skin lesions, easy bruising   HEMATOLOGIC/LYMPHATIC:  negative for swelling/edema   ALLERGIC/IMMUNOLOGIC:  negative for urticaria , itching  ENDOCRINE:  negative increase in drinking, increase in urination, hot or cold intolerance  MUSCULOSKELETAL:  negative joint pains, muscle aches, swelling of joints  NEUROLOGICAL:  negative for headaches, dizziness, lightheadedness, numbness, pain, tingling extremities      Physical Exam:     BP (!) 139/90   Pulse 92   Temp 97.3 °F (36.3 °C) (Oral)   Resp 14   Ht 5' 5\" (1.651 m)   Wt 235 lb (106.6 kg)   SpO2 99%   BMI 39.11 kg/m²   Temp (24hrs), Av.5 °F (36.4 °C), Min:97.3 °F (36.3 °C), Max:97.7 °F (36.5 °C)    Recent Labs     20  1037 20  1650 20  0751 20  1144   POCGLU 186* 167* 238* 238*     No intake or output data in the 24 hours ending 20 1355    General Appearance:  alert, well appearing, and in no acute distress  Mental status: oriented to person, place, and time with normal affect  Head:  normocephalic, atraumatic. Eye: no icterus, redness, pupils equal and reactive, extraocular eye movements intact, conjunctiva clear  Ear: normal external ear, no discharge, hearing intact  Nose:  no drainage noted  Mouth: mucous membranes moist  Neck: supple, no carotid bruits, thyroid not palpable  Lungs: Bilateral equal air entry, clear to ausculation, no wheezing, rales or rhonchi, normal effort  Cardiovascular: normal rate, regular rhythm, no murmur, gallop, rub.   Abdomen: Soft, nontender, nondistended, normal bowel sounds, no hepatomegaly or splenomegaly  Neurologic: There are no new focal motor or sensory deficits, normal muscle tone and bulk, no abnormal sensation, normal speech, cranial nerves II through XII grossly intact  Skin: No gross lesions, rashes, bruising or bleeding on exposed skin area  Extremities:  peripheral pulses palpable, no pedal edema or calf pain with palpation      Investigations:      Laboratory Testing:  Recent Results (from the past 24 hour(s))   POC Glucose Fingerstick    Collection Time: 20  4:50 PM   Result Value Ref Range    POC Glucose 167 (H) 75 - 110 mg/dL   POC Glucose Fingerstick    Collection Time: 20  7:51 AM   Result Value Ref Range    POC Glucose 238 (H) 75 - 110 mg/dL   POC Glucose Fingerstick    Collection Time: 20 11:44 AM Result Value Ref Range    POC Glucose 238 (H) 75 - 110 mg/dL       Imaging/Diagonstics:      Assessment :      Primary Problem  <principal problem not specified>    Active Hospital Problems    Diagnosis Date Noted    MDD (major depressive disorder), recurrent episode ([facility] [F33.9] 08/16/2020    Schizoaffective disorder, depressive type ([facility] [F25.1] 01/06/2020    Dyslipidemia [E78.5] 04/21/2014    DM2 (diabetes mellitus, type 2) ([facility] [E11.9] 04/21/2014    Smoking [F17.200] 04/21/2014    GERD (gastroesophageal reflux disease) [K21.9] 07/12/2012       Plan:     1. Uncontrolled diabetes mellitus noncompliance not has been taking medication restart metformin 1000 twice a day  Uncontrolled diabetes increase glimepiride to 4 mg twice a day    Class II obesity BMI 39.11     consultations:   IP CONSULT TO INTERNAL MEDICINE  IP CONSULT TO HISTORY AND PHYSICAL  IP CONSULT TO INTERNAL MEDICINE      Marjan Raza MD  8/17/2020  1:55 PM    Copy sent to Dr. Constance Betts, APRN - CNP    Please note that this chart was generated using voice recognition Dragon dictation software. Although every effort was made to ensure the accuracy of this automated transcription, some errors in transcription may have occurred.

## 2020-08-17 NOTE — PLAN OF CARE
Problem: Tobacco Use:  Goal: Inpatient tobacco use cessation counseling participation  Description: Inpatient tobacco use cessation counseling participation  8/17/2020 0955 by Viridiana Griffin RN  Outcome: Ongoing  Note: Patient given tobacco quitline number 77381631580 at this time, refusing to call at this time, states \" I just dont want to quit now\"- patient given information as to the dangers of long term tobacco use. Continue to reinforce the importance of tobacco cessation. 8/16/2020 2241 by Esau Rosenthal RN  Outcome: Ongoing  Note: Patient given tobacco quitline number 03119795530 at this time, refusing to call at this time, states \" I just dont want to quit now\"- patient given information as to the dangers of long term tobacco use. Continue to reinforce the importance of tobacco cessation. Problem: Depressive Behavior With or Without Suicide Precautions:  Goal: Able to verbalize and/or display a decrease in depressive symptoms  Description: Able to verbalize and/or display a decrease in depressive symptoms  8/17/2020 0955 by Viridiana Griffin RN  Outcome: Ongoing  Note: Patient is depressed as evidenced by flat affect, guarded, withdrawn, aloof of peers in the milieu, and isolative to room for long intervals. 8/16/2020 2241 by Esau Rosenthal RN  Outcome: Ongoing  Note: Patient is withdraw, depressed and is isolative to room during this shift. Patient is medication compliant and behavior controlled during this shift. Patient denies suicidal ideation and thoughts of self harm. Patient agreeable to seek out staff in changes in thoughts arise. Staff continues to encourage patient to be an active part of her own recovery and staff maintains Q 15 minute safety checks.   Goal: Ability to disclose and discuss suicidal ideas will improve  Description: Ability to disclose and discuss suicidal ideas will improve  8/17/2020 0955 by Viridiana Griffin RN  Outcome: Ongoing  Note: Pt denies thoughts of self harm and is agreeable to seeking out should thoughts of self harm arise. Safe environment maintained. Q15 minute checks for safety cont per unit policy. Will cont to monitor for safety and provides support and reassurance as needed. 8/16/2020 2241 by Kyle Rainey RN  Outcome: Ongoing  Goal: Participates in care planning  Description: Participates in care planning  8/17/2020 0955 by Blessing Dalton RN  Outcome: Ongoing  Note: Patient is currently refusing to attend any unit programming, even after much encouragement from the staff.  8/16/2020 2241 by Kyle Rainey RN  Outcome: Ongoing     Problem: Tobacco Use:  Intervention: Tobacco-use cessation counseling  Note: Patient is currently refusing the 21mg transdermal nicotine patch that was ordered for smoking cessation. Problem: Depressive Behavior With or Without Suicide Precautions:  Intervention: Assess psychological signs and symptoms of depression  Note: Patient is depressed as evidenced by flat affect, guarded, withdrawn, aloof of peers in the milieu, and isolative to room for long intervals. Intervention: Assess nutritional intake  Note: Patient is eating 100% of meals in the day area. Intervention: Assess behavior for possible injury to self  Note: Pt is currently not attempting to inflict self harm. Intervention: Encourage patient setting realistic goals 1 time per day  Note: Patient refused to state a goal for the day/shift. Intervention: Supervise medication intake  Note: Pt is medication compliant, and received meds per drs orders. Safety checks are maintained every 15 minutes, irregular intervals, and shift change.

## 2020-08-17 NOTE — GROUP NOTE
Group Therapy Note    Date: 8/17/2020    Group Start Time: 0900  Group End Time: 5149  Group Topic: Community Meeting    INES Ying, BRIANS    Pt did not attend 0900 goal setting group d/t resting in room despite staff invitation to attend. 1:1 talk time offered as alternative to group session, pt declined.             Signature:  Amada Poole

## 2020-08-17 NOTE — H&P
HISTORY and [place] [address]       NAME:  Yefri Davis  MRN: [id number]   YOB: 1965   Date: 8/17/2020   Age: 54 y.o. Gender: male     COMPLAINT AND PRESENT HISTORY:      Yefri Davis is 54 y.o.,  male, admitted because of recurrent major depression. Per chart review, Pt presented to ED for mental health evaluation for suicidal ideation with plan to stab himself. Pt reported having auditory hallucinations of command voices telling him to hurt himself. Pt awake, alert, fully oriented, calm and cooperative with physical assessment and medical history questions. Pt denies current suicidal ideation. Pt denies any homicidal ideation. Pt has poor sleep with having difficulty falling asleep and staying asleep. Reports loss of appetite. Denies current visual or tactile hallucinations. Current everyday smoker 1/2 PPD cigarettes. Pt denies any current alcohol or substance abuse. Pt reports living alone currently. Pt has history of DM. He does not check his blood sugars at home. Internal medicine is consulted for medical management. Pt has history of HTN. Denies recent or current chest pain/pressure, palpitations, SOB, headaches or dizziness. History of chronic lower back pain he attributes to heavy lifting when he worked. Pt has tried injections and NSAIDS with minimal relief. Reports pain is 9/10. Takes percocet at home with good relief. Reports pain does radiate down both legs to toes. Reports intermittent numbness and tingling to toes. Denies any other somatic complaints.      DIAGNOSTIC RESULTS   Labs:  CBC:   Recent Labs     08/15/20  1548   WBC 5.0   HGB 15.4        BMP:    Recent Labs     08/15/20  1548   *   K 4.3   CL 99   CO2 24   BUN 12   CREATININE 0.98   GLUCOSE 359*       PAST MEDICAL HISTORY     Past Medical History:   Diagnosis Date    Acute depression 9/8/2018    Back pain     DM2 [facility]diabetes mellitus, type 2) [facility] 4/21/2014    Generalized OA     GERD (gastroesophageal reflux disease)     HTN (hypertension)     Schizoaffective disorder, bipolar type ([facility] 3/29/2019    Unspecified sleep apnea     c  pap     Pt denies any history of stroke, heart disease, COPD, Asthma, HLD, Cancer, Seizures,Thyroid disease, Kidney Disease, Hepatitis, TB.    SURGICAL HISTORY       Past Surgical History:   Procedure Laterality Date    TONSILLECTOMY AND ADENOIDECTOMY         FAMILY HISTORY       Family History   Problem Relation Age of Onset    Diabetes Mother        SOCIAL HISTORY       Social History     Socioeconomic History    Marital status: Single     Spouse name: None    Number of children: None    Years of education: None    Highest education level: None   Occupational History    None   Social Needs    Financial resource strain: None    Food insecurity     Worry: None     Inability: None    Transportation needs     Medical: None     Non-medical: None   Tobacco Use    Smoking status: Current Every Day Smoker     Packs/day: 0.50     Years: 41.00     Pack years: 20.50     Types: Cigarettes    Smokeless tobacco: Never Used   Substance and Sexual Activity    Alcohol use: No    Drug use: Yes     Types: Marijuana, Cocaine     Comment: stopped in 2000;  Pt reports crack/cocaine use on an every other day basis    Sexual activity: None   Lifestyle    Physical activity     Days per week: None     Minutes per session: None    Stress: None   Relationships    Social connections     Talks on phone: None     Gets together: None     Attends Anglican service: None     Active member of club or organization: None     Attends meetings of clubs or organizations: None     Relationship status: None    Intimate partner violence     Fear of current or ex partner: None     Emotionally abused: None     Physically abused: None     Forced sexual activity: None   Other Topics Concern    None   Social History Narrative    None        REVIEW OF SYSTEMS      No Known Allergies    No current facility-administered medications on file prior to encounter. Current Outpatient Medications on File Prior to Encounter   Medication Sig Dispense Refill    lisinopril (PRINIVIL;ZESTRIL) 5 MG tablet Take 5 mg by mouth daily      metFORMIN (GLUCOPHAGE) 1000 MG tablet Take 1 tablet by mouth 2 times daily (with meals) 60 tablet 3    QUEtiapine (SEROQUEL) 400 MG tablet Take 1 tablet by mouth nightly (Patient taking differently: Take 600 mg by mouth nightly ) 60 tablet 3    traZODone (DESYREL) 50 MG tablet Take 50 mg by mouth nightly as needed for Sleep      gabapentin (NEURONTIN) 400 MG capsule Take 800 mg by mouth 3 times daily.  pantoprazole (PROTONIX) 40 MG tablet Take 1 tablet by mouth every morning (before breakfast) 30 tablet 3    sertraline (ZOLOFT) 100 MG tablet Take 1 tablet by mouth daily 30 tablet 0                   General health:  Fairly good. No fever or chills. Skin:  No itching, redness or rash. Head, eyes, ears, nose, throat:  No headache, epistaxis, rhinorrhea hearing loss or sore throat. Neck:  No pain, stiffness or masses. Cardiovascular/Respiratory system:  No chest pain, palpitation, shortness of breath, coughing or expectoration. Gastrointestinal tract: No abdominal pain, nausea, vomiting, dysphagia, diarrhea or constipation. Genitourinary:  No burning on micturition. No hesitancy, urgency, frequency or discoloration of urine. Locomotor:  See HPI. Neuropsychiatric:  See HPI. GENERAL PHYSICAL EXAM:     Vitals: BP (!) 139/90   Pulse 92   Temp 97.3 °F (36.3 °C) (Oral)   Resp 14   Ht 5' 5\" (1.651 m)   Wt 235 lb (106.6 kg)   SpO2 99%   BMI 39.11 kg/m²  Body mass index is 39.11 kg/m². Pt was examined with a nurse present in the room. GENERAL APPEARANCE:  Dickson Larkin is 54 y.o.,  male, moderately obese, nourished, conscious, alert.   Does not appear to be in any distress or pain at this time. SKIN:  Warm, dry, no cyanosis or jaundice. HEAD:  Normocephalic, atraumatic. EYES:  Pupils equal, reactive to light, Conjunctiva is clear, EOMs intact conner. eyelids WNL. EARS:  No discharge, no marked hearing loss. NOSE:  No rhinorrhea, epistaxis or septal deformity. THROAT:  Not congested. No ulceration bleeding or discharge. NECK:  No stiffness, trachea central.  No palpable masses or L.N.      CHEST:  Symmetrical and equal on expansion. HEART:  Regular rate and rhythm. S1 > S2, No audible murmurs or gallops. LUNGS:  Equal on expansion, normal breath sounds. No wheezing, rhonchi or rales. ABDOMEN:  Obese. NABS x 4 quads. Soft on palpation. No localized tenderness. No guarding or rigidity. LYMPHATICS:  No palpable cervical lymphadenopathy. LOCOMOTOR, BACK AND SPINE:  Tenderness to lower back. No deformities. EXTREMITIES:  Symmetrical, no pretibial edema. No calf tenderness. No discoloration or ulcerations. NEUROLOGIC:  The patient is conscious, alert, oriented, Cranial nerve II-XII intact, taste and smell were not examined. No apparent focal sensory or motor deficits. Muscle strength equal Conner. No facial droop, tongue protrudes centrally, no slurring of the speech. PROVISIONAL DIAGNOSES:      Active Problems:    GERD (gastroesophageal reflux disease)    Dyslipidemia    DM2 (diabetes mellitus, type 2) ([facility])    Smoking    Schizoaffective disorder, depressive type ([facility])    MDD (major depressive disorder), recurrent episode ([facility]  Resolved Problems:    * No resolved hospital problems.  KRISTINA Santos - CNP on 8/17/2020 at 2:15 PM

## 2020-08-17 NOTE — GROUP NOTE
Group Therapy Note    Date: 8/17/2020    Group Start Time: 1000  Group End Time: 3321  Group Topic: Recreational    STCZ RAHDA Verduzco        Group Therapy Note    Pt did not attend recreational skills group d/t resting in room despite staff invitation to attend. 1:1 talk time offered as alternative to group session, pt declined.

## 2020-08-18 LAB
GLUCOSE BLD-MCNC: 166 MG/DL (ref 75–110)
GLUCOSE BLD-MCNC: 188 MG/DL (ref 75–110)
GLUCOSE BLD-MCNC: 238 MG/DL (ref 75–110)
GLUCOSE BLD-MCNC: 254 MG/DL (ref 75–110)

## 2020-08-18 PROCEDURE — 99232 SBSQ HOSP IP/OBS MODERATE 35: CPT | Performed by: INTERNAL MEDICINE

## 2020-08-18 PROCEDURE — 6370000000 HC RX 637 (ALT 250 FOR IP): Performed by: INTERNAL MEDICINE

## 2020-08-18 PROCEDURE — 6370000000 HC RX 637 (ALT 250 FOR IP): Performed by: PSYCHIATRY & NEUROLOGY

## 2020-08-18 PROCEDURE — 6370000000 HC RX 637 (ALT 250 FOR IP): Performed by: NURSE PRACTITIONER

## 2020-08-18 PROCEDURE — 1240000000 HC EMOTIONAL WELLNESS R&B

## 2020-08-18 PROCEDURE — 99232 SBSQ HOSP IP/OBS MODERATE 35: CPT | Performed by: PSYCHIATRY & NEUROLOGY

## 2020-08-18 PROCEDURE — 82947 ASSAY GLUCOSE BLOOD QUANT: CPT

## 2020-08-18 RX ADMIN — GABAPENTIN 800 MG: 400 CAPSULE ORAL at 20:48

## 2020-08-18 RX ADMIN — GLIMEPIRIDE 4 MG: 4 TABLET ORAL at 20:48

## 2020-08-18 RX ADMIN — GABAPENTIN 800 MG: 400 CAPSULE ORAL at 15:18

## 2020-08-18 RX ADMIN — GLIMEPIRIDE 4 MG: 4 TABLET ORAL at 08:13

## 2020-08-18 RX ADMIN — METFORMIN HYDROCHLORIDE 1000 MG: 500 TABLET ORAL at 17:14

## 2020-08-18 RX ADMIN — TRAZODONE HYDROCHLORIDE 50 MG: 50 TABLET ORAL at 20:48

## 2020-08-18 RX ADMIN — QUETIAPINE FUMARATE 400 MG: 200 TABLET ORAL at 20:48

## 2020-08-18 RX ADMIN — PANTOPRAZOLE SODIUM 40 MG: 40 TABLET, DELAYED RELEASE ORAL at 08:14

## 2020-08-18 RX ADMIN — GABAPENTIN 800 MG: 400 CAPSULE ORAL at 08:13

## 2020-08-18 RX ADMIN — LISINOPRIL 5 MG: 5 TABLET ORAL at 08:13

## 2020-08-18 RX ADMIN — SERTRALINE HYDROCHLORIDE 100 MG: 100 TABLET ORAL at 08:13

## 2020-08-18 RX ADMIN — HYDROXYZINE HYDROCHLORIDE 50 MG: 50 TABLET, FILM COATED ORAL at 20:48

## 2020-08-18 RX ADMIN — METFORMIN HYDROCHLORIDE 1000 MG: 500 TABLET ORAL at 08:13

## 2020-08-18 NOTE — CONSULTS
(SEROQUEL) 400 MG tablet Take 1 tablet by mouth nightly  Patient taking differently: Take 600 mg by mouth nightly  3/24/20   Karol Montana MD   traZODone (DESYREL) 50 MG tablet Take 50 mg by mouth nightly as needed for Sleep    Historical Provider, MD   gabapentin (NEURONTIN) 400 MG capsule Take 800 mg by mouth 3 times daily. Historical Provider, MD   pantoprazole (PROTONIX) 40 MG tablet Take 1 tablet by mouth every morning (before breakfast) 1/12/20   Karol Montana MD   sertraline (ZOLOFT) 100 MG tablet Take 1 tablet by mouth daily 4/1/19   Zeferino Steward MD        Allergies:     Patient has no known allergies. Social History:     Tobacco:    reports that he has been smoking cigarettes. He has a 20.50 pack-year smoking history. He has never used smokeless tobacco.  Alcohol:      reports no history of alcohol use. Drug Use:  reports current drug use. Drugs: Marijuana and Cocaine. Family History:     Family History   Problem Relation Age of Onset    Diabetes Mother        Review of Systems:     Positive and Negative as described in HPI. CONSTITUTIONAL:  negative for fevers, chills, sweats, fatigue, weight loss  HEENT:  negative for vision, hearing changes, runny nose, throat pain  RESPIRATORY:  negative for shortness of breath, cough, congestion, wheezing. CARDIOVASCULAR:  negative for chest pain, palpitations.   GASTROINTESTINAL:  negative for nausea, vomiting, diarrhea, constipation, change in bowel habits, abdominal pain   GENITOURINARY:  negative for difficulty of urination, burning with urination, frequency   INTEGUMENT:  negative for rash, skin lesions, easy bruising   HEMATOLOGIC/LYMPHATIC:  negative for swelling/edema   ALLERGIC/IMMUNOLOGIC:  negative for urticaria , itching  ENDOCRINE:  negative increase in drinking, increase in urination, hot or cold intolerance  MUSCULOSKELETAL:  negative joint pains, muscle aches, swelling of joints  NEUROLOGICAL:  negative for headaches, dizziness, lightheadedness, numbness, pain, tingling extremities      Physical Exam:     BP (!) 149/94   Pulse 107   Temp 97.9 °F (36.6 °C) (Oral)   Resp 14   Ht 5' 5\" (1.651 m)   Wt 235 lb (106.6 kg)   SpO2 99%   BMI 39.11 kg/m²   Temp (24hrs), Av.7 °F (36.5 °C), Min:97.4 °F (36.3 °C), Max:97.9 °F (36.6 °C)    Recent Labs     20  1709 20  2042 20  0801 20  1156   POCGLU 140* 234* 238* 166*     No intake or output data in the 24 hours ending 20 1332    General Appearance:  alert, well appearing, and in no acute distress  Mental status: oriented to person, place, and time with normal affect  Head:  normocephalic, atraumatic. Eye: no icterus, redness, pupils equal and reactive, extraocular eye movements intact, conjunctiva clear  Ear: normal external ear, no discharge, hearing intact  Nose:  no drainage noted  Mouth: mucous membranes moist  Neck: supple, no carotid bruits, thyroid not palpable  Lungs: Bilateral equal air entry, clear to ausculation, no wheezing, rales or rhonchi, normal effort  Cardiovascular: normal rate, regular rhythm, no murmur, gallop, rub.   Abdomen: Soft, nontender, nondistended, normal bowel sounds, no hepatomegaly or splenomegaly  Neurologic: There are no new focal motor or sensory deficits, normal muscle tone and bulk, no abnormal sensation, normal speech, cranial nerves II through XII grossly intact  Skin: No gross lesions, rashes, bruising or bleeding on exposed skin area  Extremities:  peripheral pulses palpable, no pedal edema or calf pain with palpation      Investigations:      Laboratory Testing:  Recent Results (from the past 24 hour(s))   POC Glucose Fingerstick    Collection Time: 20  5:09 PM   Result Value Ref Range    POC Glucose 140 (H) 75 - 110 mg/dL   POC Glucose Fingerstick    Collection Time: 20  8:42 PM   Result Value Ref Range    POC Glucose 234 (H) 75 - 110 mg/dL   POC Glucose Fingerstick    Collection Time: 20  8:01 AM Result Value Ref Range    POC Glucose 238 (H) 75 - 110 mg/dL   POC Glucose Fingerstick    Collection Time: 08/18/20 11:56 AM   Result Value Ref Range    POC Glucose 166 (H) 75 - 110 mg/dL       Imaging/Diagonstics:      Assessment :      Primary Problem  <principal problem not specified>    Active Hospital Problems    Diagnosis Date Noted    MDD (major depressive disorder), recurrent episode ([facility] [F33.9] 08/16/2020    Schizoaffective disorder, depressive type ([facility] [F25.1] 01/06/2020    Dyslipidemia [E78.5] 04/21/2014    DM2 (diabetes mellitus, type 2) ([facility] [E11.9] 04/21/2014    Smoking [F17.200] 04/21/2014    GERD (gastroesophageal reflux disease) [K21.9] 07/12/2012       Plan:     1. Uncontrolled diabetes mellitus noncompliance not has been taking medication restart metformin 1000 twice a day  Uncontrolled diabetes increase glimepiride to 4 mg twice a day  Add invokana  100 mg    Class II obesity BMI 39.11     consultations:   IP CONSULT TO INTERNAL MEDICINE  IP CONSULT TO HISTORY AND PHYSICAL  IP CONSULT TO INTERNAL MEDICINE      Carmencita Monaco MD  8/18/2020  1:32 PM    Copy sent to Dr. Chang Munoz, APRN - CNP    Please note that this chart was generated using voice recognition Dragon dictation software. Although every effort was made to ensure the accuracy of this automated transcription, some errors in transcription may have occurred.

## 2020-08-18 NOTE — PROGRESS NOTES
BEHAVIORAL HEALTH FOLLOW-UP NOTE     8/18/2020     Patient was seen and examined in person, Chart reviewed   Patient's case discussed with staff/team    Chief Complaint: Psychosis. Interim History:   The patient was reluctant to be seen. He came in with the agenda to be discharged. Patient is discharge focussed. Says voices have calmed down. Medications are helping. Denies wanting to use iillicit drugs. He is irritable.      Appetite: *  [x] Normal/Unchanged  [] Increased  [] Decreased      Sleep:       [] Normal/Unchanged  [x] Fair       [] Poor              Energy:    [] Normal/Unchanged  [] Increased  [x] Decreased        Aggression:  [] yes  [x] no    Patient is [x] able  [] unable to CONTRACT FOR SAFETY ON THE UNIT    PAST MEDICAL/PSYCHIATRIC HISTORY:   Past Medical History:   Diagnosis Date    Acute depression 9/8/2018    Back pain     DM2 (diabetes mellitus, type 2) ([facility] 4/21/2014    Generalized OA     GERD (gastroesophageal reflux disease)     HTN (hypertension)     Schizoaffective disorder, bipolar type ([facility] 3/29/2019    Unspecified sleep apnea     c  pap       FAMILY/SOCIAL HISTORY:  Family History   Problem Relation Age of Onset    Diabetes Mother      Social History     Socioeconomic History    Marital status: Single     Spouse name: Not on file    Number of children: Not on file    Years of education: Not on file    Highest education level: Not on file   Occupational History    Not on file   Social Needs    Financial resource strain: Not on file    Food insecurity     Worry: Not on file     Inability: Not on file   [employer] needs     Medical: Not on file     Non-medical: Not on file   Tobacco Use    Smoking status: Current Every Day Smoker     Packs/day: 0.50     Years: 41.00     Pack years: 20.50     Types: Cigarettes    Smokeless tobacco: Never Used   Substance and Sexual Activity    Alcohol use: No    Drug use: Yes     Types: Marijuana, Cocaine     Comment: stopped in 2000; Pt reports crack/cocaine use on an every other day basis    Sexual activity: Not on file   Lifestyle    Physical activity     Days per week: Not on file     Minutes per session: Not on file    Stress: Not on file   Relationships    Social connections     Talks on phone: Not on file     Gets together: Not on file     Attends Jehovah's witness service: Not on file     Active member of club or organization: Not on file     Attends meetings of clubs or organizations: Not on file     Relationship status: Not on file    Intimate partner violence     Fear of current or ex partner: Not on file     Emotionally abused: Not on file     Physically abused: Not on file     Forced sexual activity: Not on file   Other Topics Concern    Not on file   Social History Narrative    Not on file           ROS:  [x] All negative/unchanged except if checked.  Explain positive(checked items) below:  [] Constitutional  [] Eyes  [] Ear/Nose/Mouth/Throat  [] Respiratory  [] CV  [] GI  []   [] Musculoskeletal  [] Skin/Breast  [] Neurological  [] Endocrine  [] Heme/Lymph  [] Allergic/Immunologic    Explanation:     MEDICATIONS:    Current Facility-Administered Medications:     [START ON 8/19/2020] canagliflozin (INVOKANA) tablet 100 mg, 100 mg, Oral, QAM AC, Storm Sola Mcleod MD    glimepiride (AMARYL) tablet 4 mg, 4 mg, Oral, BID, Peterson Rodríguez MD, 4 mg at 08/18/20 0813    gabapentin (NEURONTIN) capsule 800 mg, 800 mg, Oral, TID, Peterson Rodríguez MD, 800 mg at 08/18/20 0813    hydrOXYzine (ATARAX) tablet 50 mg, 50 mg, Oral, TID PRN, Jessie Yee MD, 50 mg at 08/16/20 1138    lisinopril (PRINIVIL;ZESTRIL) tablet 5 mg, 5 mg, Oral, Daily, Naga Birch MD, 5 mg at 08/18/20 0813    pantoprazole (PROTONIX) tablet 40 mg, 40 mg, Oral, QAM AC, Naga Birch MD, 40 mg at 08/18/20 0814    QUEtiapine (SEROQUEL) tablet 400 mg, 400 mg, Oral, Nightly, Naga Birch MD, 400 mg at 08/17/20 2046    sertraline (ZOLOFT) place, and time   Concentration intact  Insight poor   Judgement poor     ASSESSMENT: Slight improvement in depressive symptoms patient symptoms are:  [] Well controlled  [] Improving  [] Worsening  [] No change      Diagnosis: Schizoaffective disorder depressive type      LABS:    Recent Labs     08/15/20  1548   WBC 5.0   HGB 15.4        Recent Labs     08/15/20  1548   *   K 4.3   CL 99   CO2 24   BUN 12   CREATININE 0.98   GLUCOSE 359*     No results for input(s): BILITOT, ALKPHOS, AST, ALT in the last 72 hours. Lab Results   Component Value Date    BARBSCNU NEGATIVE 03/29/2019    LABBENZ NEGATIVE 03/29/2019    LABBENZ NEGATIVE 12/23/2012    LABMETH NEGATIVE 03/29/2019    PPXUR NOT REPORTED 03/29/2019     Lab Results   Component Value Date    TSH 1.22 03/09/2020     No results found for: LITHIUM  No results found for: VALPROATE, CBMZ    RISK ASSESSMENT: Moderate risk of suicide. Low risk of harm to others    Treatment Plan:  Reviewed current Medications with the patient. No changes    Risks, benefits, side effects, drug-to-drug interactions and alternatives to treatment were discussed. The patient  consented to treatment. Encourage patient to attend group and other milieu activities. Discharge planning discussed with the patient and treatment team.    PSYCHOTHERAPY/COUNSELING:  [] Therapeutic interview  [x] Supportive  [] CBT  [] Ongoing  [] Other    [x] Patient continues to need, on a daily basis, active treatment furnished directly by or requiring the supervision of inpatient psychiatric personnel      Anticipated Length of stay: 1-2 days. Jd Samuel is a 54 y.o. male being evaluated by a Virtual Visit (video visit) encounter to address concerns as mentioned above. A caregiver was present in the room along with the patient.  Pursuant to the emergency declaration under the [address] waiver authority and the Coronavirus Preparedness and Response Supplemental Appropriations Act, this Virtual Visit was conducted with patient's (and/or legal guardian's) consent, to reduce the patient's risk of exposure to COVID-19 and provide necessary medical care. Services were provided through a video synchronous discussion virtually to substitute for in-person visit by provider. Patient [facility] at [facility]  and I am physically present at my home in [place] [address], [place] on 8/18/2020 at 1:13 PM    An electronic signature was used to authenticate this note. **This report has been created using voice recognition software. It may contain minor errors which are inherent in voice recognition technology. **

## 2020-08-18 NOTE — GROUP NOTE
Group Therapy Note    Date: 8/18/2020    Group Start Time: 1430  Group End Time: 1500  Group Topic: Psychoeducation    [facility]    Jessica Mares, CTRS        Group Therapy Note    Attendees: 5         Patient's Goal:  To increase interpersonal skills     Notes:  Patient attended group and participated     Status After Intervention:  Improved    Participation Level:  Active Listener and Interactive    Participation Quality: Appropriate, Attentive and Sharing      Speech:  normal      Thought Process/Content: Logical      Affective Functioning: Congruent      Mood: euthymic      Level of consciousness:  Alert and Oriented x4      Response to Learning: Progressing to goal      Endings: None Reported    Modes of Intervention: Education and Socialization      Discipline Responsible: Psychoeducational Specialist      Signature:  Vivi Tsang

## 2020-08-18 NOTE — GROUP NOTE
Date: August 18, 2020  Group Start Time: 1600  Group End Time: [address] [place] [facility]  Oneil Rayasandra  Attendees: 11/18    Patient's Goal: Increased understanding of methods and ways to cope. Notes: Patient participated in discussion regarding coping skills. Status After Intervention: Improved    Participation Level:  Active Listener and Interactive    Participation Quality: Appropriate, attentive and supportive    Speech:  Normal    Thought Process/Content: Logical and linear    Affective Functioning: Congruent    Mood: WDL    Level of consciousness: Oriented x4    Response to Learning: Progressing to goal; able to verbalize current knowledge/experience, acknowledge new learning, retain information and change behavior    Endings: None reported    Modes of Intervention: Education and exploration    Discipline Responsible: Behavioral Health Tech    Signature:  HUDSON Larson

## 2020-08-18 NOTE — GROUP NOTE
Group Therapy Note    Date: 8/18/2020    Group Start Time: 1300  Group End Time: 6303  Group Topic: Healthy Living/Wellness    INES BHLEXIE Vieyra        Group Therapy Note    Attendees:7         Patient's Goal:  To improve coping skills     Notes:  Pt was pleasant and participated well     Status After Intervention:  Improved    Participation Level:  Active Listener    Participation Quality: Appropriate and Attentive      Speech:  normal      Thought Process/Content: Logical      Affective Functioning: Congruent      Mood: euthymic      Level of consciousness:  Alert      Response to Learning: Able to verbalize current knowledge/experience and Progressing to goal      Endings: None Reported    Modes of Intervention: Education and Support      Discipline Responsible: Psychoeducational Specialist      Signature:  Gerson Vargas

## 2020-08-18 NOTE — GROUP NOTE
Group Therapy Note    Date: 8/18/2020    Group Start Time: 1000  Group End Time: 2510  Group Topic: Cognitive Skills    INES Ritter, CTRS        Group Therapy Note    Attendees: 4    Pt did not attend Therapeutic Recreation d/t resting in room despite staff invitation to attend. 1:1 talk time offered as alternative to group session, pt declined.

## 2020-08-18 NOTE — PLAN OF CARE
Problem: Depressive Behavior With or Without Suicide Precautions:  Goal: Able to verbalize and/or display a decrease in depressive symptoms  Description: Able to verbalize and/or display a decrease in depressive symptoms  8/18/2020 1047 by Francis Stone  Outcome: Ongoing  Patient admits to feelings of depression and rates the severity 4/10. Writer offered support and patient accepted. Will continue to monitor and provide support as needed. Q15min checks for safety. Problem: Depressive Behavior With or Without Suicide Precautions:  Goal: Ability to disclose and discuss suicidal ideas will improve  Description: Ability to disclose and discuss suicidal ideas will improve  8/18/2020 1047 by Francis Stone  Outcome: Ongoing  Patient denies suicidal ideations. Agreeable to talking with staff if thoughts to harm self arise. Q15min checks maintained for safety. Problem: Depressive Behavior With or Without Suicide Precautions:  Goal: Participates in care planning  Description: Participates in care planning  8/18/2020 1047 by Francis Stone  Outcome: Ongoing  Patient has participated in care planning this shift by discussing plans for discharge and goals with writer. Patient will be returning home after discharge and reports a plan to follow up with community mental health services.

## 2020-08-19 VITALS
RESPIRATION RATE: 16 BRPM | HEIGHT: 65 IN | BODY MASS INDEX: 39.15 KG/M2 | SYSTOLIC BLOOD PRESSURE: 124 MMHG | HEART RATE: 88 BPM | OXYGEN SATURATION: 98 % | WEIGHT: 235 LBS | TEMPERATURE: 98 F | DIASTOLIC BLOOD PRESSURE: 70 MMHG

## 2020-08-19 LAB
GLUCOSE BLD-MCNC: 198 MG/DL (ref 75–110)
GLUCOSE BLD-MCNC: 264 MG/DL (ref 75–110)

## 2020-08-19 PROCEDURE — 6370000000 HC RX 637 (ALT 250 FOR IP): Performed by: INTERNAL MEDICINE

## 2020-08-19 PROCEDURE — 99239 HOSP IP/OBS DSCHRG MGMT >30: CPT | Performed by: PSYCHIATRY & NEUROLOGY

## 2020-08-19 PROCEDURE — 6370000000 HC RX 637 (ALT 250 FOR IP): Performed by: PSYCHIATRY & NEUROLOGY

## 2020-08-19 PROCEDURE — 82947 ASSAY GLUCOSE BLOOD QUANT: CPT

## 2020-08-19 RX ORDER — HYDROXYZINE 50 MG/1
50 TABLET, FILM COATED ORAL 3 TIMES DAILY PRN
Qty: 60 TABLET | Refills: 0 | Status: SHIPPED | OUTPATIENT
Start: 2020-08-19 | End: 2020-08-19

## 2020-08-19 RX ORDER — HYDROXYZINE 50 MG/1
50 TABLET, FILM COATED ORAL 3 TIMES DAILY PRN
Qty: 60 TABLET | Refills: 0 | Status: SHIPPED | OUTPATIENT
Start: 2020-08-19 | End: 2020-08-29

## 2020-08-19 RX ORDER — GLIMEPIRIDE 4 MG/1
4 TABLET ORAL 2 TIMES DAILY
Qty: 30 TABLET | Refills: 3 | Status: SHIPPED | OUTPATIENT
Start: 2020-08-19 | End: 2020-08-19

## 2020-08-19 RX ORDER — GLIMEPIRIDE 4 MG/1
4 TABLET ORAL 2 TIMES DAILY
Qty: 30 TABLET | Refills: 3 | Status: SHIPPED | OUTPATIENT
Start: 2020-08-19 | End: 2021-12-06

## 2020-08-19 RX ADMIN — GABAPENTIN 800 MG: 400 CAPSULE ORAL at 10:42

## 2020-08-19 RX ADMIN — PANTOPRAZOLE SODIUM 40 MG: 40 TABLET, DELAYED RELEASE ORAL at 08:32

## 2020-08-19 RX ADMIN — LISINOPRIL 5 MG: 5 TABLET ORAL at 08:34

## 2020-08-19 RX ADMIN — METFORMIN HYDROCHLORIDE 1000 MG: 500 TABLET ORAL at 10:42

## 2020-08-19 RX ADMIN — GLIMEPIRIDE 4 MG: 4 TABLET ORAL at 10:41

## 2020-08-19 RX ADMIN — CANAGLIFLOZIN 100 MG: 100 TABLET, FILM COATED ORAL at 06:11

## 2020-08-19 RX ADMIN — SERTRALINE HYDROCHLORIDE 100 MG: 100 TABLET ORAL at 10:41

## 2020-08-19 ASSESSMENT — PAIN SCALES - GENERAL: PAINLEVEL_OUTOF10: 0

## 2020-08-19 NOTE — GROUP NOTE
Group Therapy Note    Date: 8/19/2020    Group Start Time: 1100  Group End Time: 1130  Group Topic: Psychotherapy    [facility]    Elda Crouch        Group Therapy Note    4/11         Patient's Goal:  PT will demonstrate increased interpersonal interaction and a clear understanding on multiple types of coping skills relating to the here-and-now therapeutic practice. Notes:  Patient is making progress, AEB participating in group discussion, actively listening, and supporting other group members. PT participates in group and encourages others to participate. Status After Intervention:  Improved    Participation Level: Active Listener and Interactive    Participation Quality: Appropriate, Attentive and Sharing      Speech:  normal      Thought Process/Content: Logical      Affective Functioning: Flat      Mood: depressed      Level of consciousness:  Alert, Oriented x4 and Attentive      Response to Learning: Able to verbalize current knowledge/experience and Progressing to goal      Endings: None Reported    Modes of Intervention: Support, Socialization, Exploration and Clarifying      Discipline Responsible: /Counselor      Signature:   Elda Crouch

## 2020-08-19 NOTE — BH NOTE
[facility]  Discharge Note    Pt discharged with followings belongings:   Dentures: None  Vision - Corrective Lenses: None  Hearing Aid: None  Jewelry: None  Body Piercings Removed: No  Clothing: Footwear, Pants, Shirt, Undergarments (Comment), Socks  Were All Patient Medications Collected?: Yes  Other Valuables: Cell phone, Money (Comment), Keys   Valuables sent home with patient. Valuables retrieved from safe, Security envelope number:  [phone] and returned to patient. Patient education on aftercare instructions: yes  Information faxed to [facility] by nurse Patient verbalize understanding of AVS:  yes.     Status EXAM upon discharge:  Status and Exam  Normal: Yes  Facial Expression: Brightened  Affect: Appropriate  Level of Consciousness: Alert  Mood:Normal: Yes  Mood: Depressed, Anxious  Motor Activity:Normal: Yes  Motor Activity: Decreased  Interview Behavior: Cooperative  Preception: Marion to Person, General Betty to Time, Marion to Place, Marion to Situation  Attention:Normal: No  Attention: Distractible  Thought Processes: Blocking  Thought Content:Normal: Yes  Thought Content: Preoccupations  Hallucinations: None  Delusions: No  Memory:Normal: No  Memory: Poor Remote  Insight and Judgment: No  Insight and Judgment: Poor Insight  Present Suicidal Ideation: No  Present Homicidal Ideation: No      Metabolic Screening:    Lab Results   Component Value Date    LABA1C 10.8 (H) 08/15/2020       Lab Results   Component Value Date    CHOL 189 03/09/2020    CHOL 174 12/09/2016    CHOL 176 12/09/2016    CHOL 190 09/08/2014    CHOL 195 02/24/2014    CHOL 195 02/24/2014     Lab Results   Component Value Date    TRIG 157 (H) 03/09/2020    TRIG 95 12/09/2016    TRIG 99 12/09/2016    TRIG 140 09/08/2014    TRIG 113 02/24/2014    TRIG 113 02/24/2014     Lab Results   Component Value Date    HDL 74 03/09/2020    HDL 71 02/11/2019    HDL 78 06/27/2017    HDL 70 12/09/2016    HDL 71 12/09/2016    HDL 62 09/08/2014    HDL 61

## 2020-08-19 NOTE — PLAN OF CARE
Problem: Tobacco Use:  Goal: Inpatient tobacco use cessation counseling participation  Description: Inpatient tobacco use cessation counseling participation  Outcome: Ongoing  Note: Patient refused tobacco cessation education     Problem: Depressive Behavior With or Without Suicide Precautions:  Goal: Able to verbalize and/or display a decrease in depressive symptoms  Description: Able to verbalize and/or display a decrease in depressive symptoms  8/19/2020 1302 by Leonel Mcdonough RN  Outcome: Ongoing  Note: Patient is calm and cooperative. Patient denies suicidal and homicidal ideations and agrees to seek staff if those feelings arise. Safety checks are in place. Will continue to monitor for safety and support.      Problem: Depressive Behavior With or Without Suicide Precautions:  Goal: Ability to disclose and discuss suicidal ideas will improve  Description: Ability to disclose and discuss suicidal ideas will improve  8/19/2020 1302 by Leonel Mcdonough RN  Outcome: Ongoing     Problem: Depressive Behavior With or Without Suicide Precautions:  Goal: Participates in care planning  Description: Participates in care planning  Outcome: Ongoing

## 2020-08-19 NOTE — BH NOTE
Patient given tobacco quitline number 30343619837 at this time, refusing to call at this time, states \" I just dont want to quit now\"- patient given information as to the dangers of long term tobacco use. Continue to reinforce the importance of tobacco cessation.

## 2020-08-19 NOTE — PROGRESS NOTES
CLINICAL PHARMACY NOTE: MEDS TO [address] [place] [address] Drive Select Patient?: No  Total # of Prescriptions Filled: 0   The following medications were cancelled:  · Hydroxyzine 50 mg  · Invokana 100 mg  · Glimepiride 4 mg  Total # of Interventions Completed: 1  Time Spent (min): 15    Additional Documentation:  The patient is locked in to [facility] and is unable to participate in Meds to [facility]. The nurse was informed and she said that she will have the doctor send the prescriptions to [facility].

## 2020-08-19 NOTE — GROUP NOTE
Group Therapy Note    Date: 8/19/2020    Group Start Time: 1000  Group End Time: 0124  Group Topic: Cognitive Skills    LEXIE Coyne        Group Therapy Note    Attendees: 6/11           Patient's Goal:  To increase social interaction and to explore and identify short term goals r/t wellness and recovery. RT discussed group schedule and unit structure/resources and encouraged pts to be engaged in their treatment. Pts were given opportunities to ask questions. Notes: Pt participated in group task and was able to identify short term goals r/t wellness and recovery. Pt is pleasant and supportive of peers        Status After Intervention:  Improved     Participation Level:  Active Listener and Interactive     Participation Quality: Appropriate, Attentive, Sharing         Speech:   Normal     Thought Process/Content: Logical,linear     Affective Functioning: Congruent,smiling, joking with peers     Mood: euthymic        Level of consciousness:  Alert, and Attentive        Response to Learning: Able to verbalize current knowledge/experience, Able to verbalize/acknowledge new learning, and Progressing to goal        Endings: None Reported     Modes of Intervention: Education, Support, Socialization, Exploration, Clarifying and Problem-solving        Discipline Responsible: Psychoeducational Specialist        Signature:  Flakita Form

## 2020-08-19 NOTE — PLAN OF CARE
Problem: Depressive Behavior With or Without Suicide Precautions:  Goal: Able to verbalize and/or display a decrease in depressive symptoms  Description: Able to verbalize and/or display a decrease in depressive symptoms  Outcome: Ongoing  Note: Patient was isolative to room and only came out for needs. Problem: Depressive Behavior With or Without Suicide Precautions:  Goal: Ability to disclose and discuss suicidal ideas will improve  Description: Ability to disclose and discuss suicidal ideas will improve  Outcome: Ongoing  Note: Patient denies SI at this time. Q15 min safety checks competed through out shift.

## 2020-08-19 NOTE — DISCHARGE SUMMARY
in 2000;  Pt reports crack/cocaine use on an every other day basis    Sexual activity: Not on file   Lifestyle    Physical activity     Days per week: Not on file     Minutes per session: Not on file    Stress: Not on file   Relationships    Social connections     Talks on phone: Not on file     Gets together: Not on file     Attends Confucianist service: Not on file     Active member of club or organization: Not on file     Attends meetings of clubs or organizations: Not on file     Relationship status: Not on file    Intimate partner violence     Fear of current or ex partner: Not on file     Emotionally abused: Not on file     Physically abused: Not on file     Forced sexual activity: Not on file   Other Topics Concern    Not on file   Social History Narrative    Not on file       MEDICATIONS:    Current Facility-Administered Medications:     canagliflozin (INVOKANA) tablet 100 mg, 100 mg, Oral, Marjan THORPE MD, 100 mg at 08/19/20 0611    glimepiride (AMARYL) tablet 4 mg, 4 mg, Oral, BID, Marjan Raza MD, 4 mg at 08/19/20 1041    gabapentin (NEURONTIN) capsule 800 mg, 800 mg, Oral, TID, Marjan Raza MD, 800 mg at 08/19/20 1042    hydrOXYzine (ATARAX) tablet 50 mg, 50 mg, Oral, TID PRN, Checo Ramirez MD, 50 mg at 08/18/20 2048    lisinopril (PRINIVIL;ZESTRIL) tablet 5 mg, 5 mg, Oral, Daily, Marly Hall MD, 5 mg at 08/19/20 0834    pantoprazole (PROTONIX) tablet 40 mg, 40 mg, Oral, [facility], Marly Hall MD, 40 mg at 08/19/20 2966    QUEtiapine (SEROQUEL) tablet 400 mg, 400 mg, Oral, Nightly, Marly Hall MD, 400 mg at 08/18/20 2048    sertraline (ZOLOFT) tablet 100 mg, 100 mg, Oral, Daily, Marly Hall MD, 100 mg at 08/19/20 1041    acetaminophen (TYLENOL) tablet 650 mg, 650 mg, Oral, Q4H PRN, Marly Hall MD    bisacodyl (DULCOLAX) EC tablet 5 mg, 5 mg, Oral, Daily PRN, Marly Hall MD    metFORMIN (GLUCOPHAGE) tablet 1,000 mg, 1,000 mg, Oral, []Present  [x] Absent   Aggression:  [] yes  [] no  Patient is [x] able  [] unable to CONTRACT FOR SAFETY   Medication side effects(SE):  [x] None(Psych. Meds.) [] Other      Mental Status Examination on discharge:    Level of consciousness:  within normal limits   Appearance:  well-appearing  Behavior/Motor:  no abnormalities noted  Attitude toward examiner:  attentive and good eye contact  Speech:  spontaneous, normal rate and normal volume   Mood: euthymic  Affect:  mood congruent  Thought processes:  linear, goal directed and coherent   Thought content:  Suicidal Ideation:  denies suicidal ideation  Delusions:  no evidence of delusions  Perceptual Disturbance:  denies any perceptual disturbance  Cognition:  oriented to person, place, and time   Concentration intact  Memory intact  Insight good   Judgement fair   Fund of Knowledge adequate      ASSESSMENT:  Patient symptoms are:  [x] Well controlled  [x] Improving  [] Worsening  [] No change      Diagnosis:  Active Problems:    GERD (gastroesophageal reflux disease)    Dyslipidemia    DM2 (diabetes mellitus, type 2) ([facility])    Smoking    Schizoaffective disorder, depressive type ([facility]    MDD (major depressive disorder), recurrent episode ([facility]  Resolved Problems:    * No resolved hospital problems. *      LABS:    No results for input(s): WBC, HGB, PLT in the last 72 hours. No results for input(s): NA, K, CL, CO2, BUN, CREATININE, GLUCOSE in the last 72 hours. No results for input(s): BILITOT, ALKPHOS, AST, ALT in the last 72 hours. Lab Results   Component Value Date    BARBSCNU NEGATIVE 03/29/2019    LABBENZ NEGATIVE 03/29/2019    LABBENZ NEGATIVE 12/23/2012    LABMETH NEGATIVE 03/29/2019    PPXUR NOT REPORTED 03/29/2019     Lab Results   Component Value Date    TSH 1.22 03/09/2020     No results found for: LITHIUM  No results found for: VALPROATE, CBMZ    RISK ASSESSMENT AT DISCHARGE: Low risk for suicide and homicide.      Treatment Plan:  Reviewed current Medications with the patient. Education provided on the complaince with treatment. Risks, benefits, side effects, drug-to-drug interactions and alternatives to treatment were discussed. Encourage patient to attend outpatient follow up appointment and therapy. Patient was advised to call the outpatient provider, visit the nearest ED or call 911 if symptoms are not manageable.      .         Medication List      START taking these medications    canagliflozin 100 MG Tabs tablet  Commonly known as:  INVOKANA  Take 1 tablet by mouth every morning (before breakfast)  Start taking on:  August 20, 2020     glimepiride 4 MG tablet  Commonly known as:  AMARYL  Take 1 tablet by mouth 2 times daily     hydrOXYzine 50 MG tablet  Commonly known as:  ATARAX  Take 1 tablet by mouth 3 times daily as needed for Anxiety (agitation)        CHANGE how you take these medications    QUEtiapine 400 MG tablet  Commonly known as:  SEROQUEL  Take 1 tablet by mouth nightly  What changed:  how much to take        CONTINUE taking these medications    gabapentin 400 MG capsule  Commonly known as:  NEURONTIN     lisinopril 5 MG tablet  Commonly known as:  PRINIVIL;ZESTRIL     metFORMIN 1000 MG tablet  Commonly known as:  GLUCOPHAGE  Take 1 tablet by mouth 2 times daily (with meals)     pantoprazole 40 MG tablet  Commonly known as:  PROTONIX  Take 1 tablet by mouth every morning (before breakfast)     sertraline 100 MG tablet  Commonly known as:  ZOLOFT  Take 1 tablet by mouth daily     traZODone 50 MG tablet  Commonly known as:  DESYREL           Where to Get Your Medications      These medications were sent to [facility], [address] [place] [address],[address]  [place] [address] [address] [place] [address]    Phone:  [phone]   · canagliflozin 100 MG Tabs tablet  · glimepiride 4 MG tablet  · hydrOXYzine 50 MG tablet           TIME SPENT - 35 MINUTES TO COMPLETE THE EVALUATION,

## 2020-08-19 NOTE — GROUP NOTE
Group Therapy Note    Date: 8/19/2020    Group Start Time: 0900  Group End Time: 0920  Group Topic: Community Meeting    INES [place], [place]        Group Therapy Note    Attendees: 7/21           Patient's Goal:  To increase social interaction and to explore and identify short term goals r/t wellness and recovery. RT discussed group schedule and unit structure/resources and encouraged pts to be engaged in their treatment. Pts were given opportunities to ask questions. Notes: Pt participated in group task and was able to identify short term goals r/t wellness and recovery. Pt is pleasant and supportive of peers        Status After Intervention:  Improved     Participation Level:  Active Listener and Interactive     Participation Quality: Appropriate, Attentive, Sharing         Speech:   Normal     Thought Process/Content: Logical,linear     Affective Functioning: Blunted     Mood: euthymic        Level of consciousness:  Alert, and Attentive        Response to Learning: Able to verbalize current knowledge/experience, Able to verbalize/acknowledge new learning, and Progressing to goal        Endings: None Reported     Modes of Intervention: Education, Support, Socialization, Exploration, Clarifying and Problem-solving        Discipline Responsible: Psychoeducational Specialist        Signature:  Haven Snellen

## 2020-08-19 NOTE — CARE COORDINATION
Name: Kt Serna    : 1965    Discharge Date: 20    Primary Auth/Cert #: [id number]    Discharge Medications:      Medication List      START taking these medications    canagliflozin 100 MG Tabs tablet  Commonly known as:  INVOKANA  Take 1 tablet by mouth every morning (before breakfast)  Start taking on:  2020  Notes to patient:  Glucose management     glimepiride 4 MG tablet  Commonly known as:  AMARYL  Take 1 tablet by mouth 2 times daily  Notes to patient:  Glucose Management     hydrOXYzine 50 MG tablet  Commonly known as:  ATARAX  Take 1 tablet by mouth 3 times daily as needed for Anxiety (agitation)  Notes to patient:  Agitation        CHANGE how you take these medications    QUEtiapine 400 MG tablet  Commonly known as:  SEROQUEL  Take 1 tablet by mouth nightly  What changed:  how much to take  Notes to patient:  Sleep        CONTINUE taking these medications    gabapentin 400 MG capsule  Commonly known as:  NEURONTIN  Notes to patient:  Nerve pain     lisinopril 5 MG tablet  Commonly known as:  PRINIVIL;ZESTRIL  Notes to patient:  Blood pressure     metFORMIN 1000 MG tablet  Commonly known as:  GLUCOPHAGE  Take 1 tablet by mouth 2 times daily (with meals)  Notes to patient:  Glucose Management     pantoprazole 40 MG tablet  Commonly known as:  PROTONIX  Take 1 tablet by mouth every morning (before breakfast)  Notes to patient:  Acid reflex     sertraline 100 MG tablet  Commonly known as:  ZOLOFT  Take 1 tablet by mouth daily  Notes to patient:  Mood     traZODone 50 MG tablet  Commonly known as:  DESYREL  Notes to patient:  Sleep            Where to Get Your Medications      These medications were sent to [address] [phone] - [phone]  [address] [place] [address]    Phone:  [phone]   · canagliflozin 100 MG Tabs tablet  · glimepiride 4 MG tablet  · hydrOXYzine 50 MG tablet         Follow Up Appointment: KRISTINA Huber - CNP  [address] [facility] [address]  [address]  [address]  Phone: [phone]  Fax: [phone] on 8/24/2020  Your appointment is on Monday, Aug. 24 at 10:40am in the office. Discharge to home address:  [place] [address]  Go on 8/19/2020  Please send home by [facility] transportation

## 2021-01-08 ENCOUNTER — HOSPITAL ENCOUNTER (OUTPATIENT)
Dept: GENERAL RADIOLOGY | Age: 56
Discharge: HOME | End: 2021-01-10
Payer: COMMERCIAL

## 2021-01-08 ENCOUNTER — HOSPITAL ENCOUNTER (OUTPATIENT)
Age: 56
Discharge: HOME | End: 2021-01-08
Payer: COMMERCIAL

## 2021-01-08 ENCOUNTER — HOSPITAL ENCOUNTER (OUTPATIENT)
Age: 56
Discharge: HOME | End: 2021-01-10
Payer: COMMERCIAL

## 2021-01-08 DIAGNOSIS — R07.9 CHEST PAIN, UNSPECIFIED TYPE: ICD-10-CM

## 2021-01-08 LAB
-: NORMAL
ABSOLUTE EOS #: 0.05 K/UL (ref 0–0.44)
ABSOLUTE IMMATURE GRANULOCYTE: 0.04 K/UL (ref 0–0.3)
ABSOLUTE LYMPH #: 1.42 K/UL (ref 1.1–3.7)
ABSOLUTE MONO #: 0.49 K/UL (ref 0.1–1.2)
ALBUMIN SERPL-MCNC: 3.8 G/DL (ref 3.5–5.2)
ALBUMIN/GLOBULIN RATIO: 1.7 (ref 1–2.5)
ALP BLD-CCNC: 128 U/L (ref 40–129)
ALT SERPL-CCNC: 17 U/L (ref 5–41)
AMORPHOUS: NORMAL
ANION GAP SERPL CALCULATED.3IONS-SCNC: 10 MMOL/L (ref 9–17)
AST SERPL-CCNC: 14 U/L
BACTERIA: NORMAL
BASOPHILS # BLD: 0 % (ref 0–2)
BASOPHILS ABSOLUTE: <0.03 K/UL (ref 0–0.2)
BILIRUB SERPL-MCNC: 0.38 MG/DL (ref 0.3–1.2)
BILIRUBIN URINE: NEGATIVE
BUN BLDV-MCNC: 18 MG/DL (ref 6–20)
BUN/CREAT BLD: ABNORMAL (ref 9–20)
CALCIUM SERPL-MCNC: 9.4 MG/DL (ref 8.6–10.4)
CASTS UA: NORMAL /LPF (ref 0–8)
CHLORIDE BLD-SCNC: 101 MMOL/L (ref 98–107)
CHOLESTEROL/HDL RATIO: 2.7
CHOLESTEROL: 180 MG/DL
CO2: 27 MMOL/L (ref 20–31)
COLOR: YELLOW
COMMENT UA: ABNORMAL
CREAT SERPL-MCNC: 0.89 MG/DL (ref 0.7–1.2)
CREATININE URINE: 93.9 MG/DL (ref 39–259)
CRYSTALS, UA: NORMAL /HPF
DIFFERENTIAL TYPE: ABNORMAL
EOSINOPHILS RELATIVE PERCENT: 1 % (ref 1–4)
EPITHELIAL CELLS UA: NORMAL /HPF (ref 0–5)
ESTIMATED AVERAGE GLUCOSE: 252 MG/DL
GFR AFRICAN AMERICAN: >60 ML/MIN
GFR NON-AFRICAN AMERICAN: >60 ML/MIN
GFR SERPL CREATININE-BSD FRML MDRD: ABNORMAL ML/MIN/{1.73_M2}
GFR SERPL CREATININE-BSD FRML MDRD: ABNORMAL ML/MIN/{1.73_M2}
GLUCOSE BLD-MCNC: 321 MG/DL (ref 70–99)
GLUCOSE URINE: ABNORMAL
HBA1C MFR BLD: 10.4 % (ref 4–6)
HCT VFR BLD CALC: 41 % (ref 40.7–50.3)
HDLC SERPL-MCNC: 66 MG/DL
HEMOGLOBIN: 14.1 G/DL (ref 13–17)
IMMATURE GRANULOCYTES: 1 %
KETONES, URINE: NEGATIVE
LDL CHOLESTEROL: 84 MG/DL (ref 0–130)
LEUKOCYTE ESTERASE, URINE: NEGATIVE
LYMPHOCYTES # BLD: 24 % (ref 24–43)
MCH RBC QN AUTO: 28.8 PG (ref 25.2–33.5)
MCHC RBC AUTO-ENTMCNC: 34.4 G/DL (ref 28.4–34.8)
MCV RBC AUTO: 83.8 FL (ref 82.6–102.9)
MICROALBUMIN/CREAT 24H UR: <12 MG/L
MICROALBUMIN/CREAT UR-RTO: NORMAL MCG/MG CREAT
MONOCYTES # BLD: 8 % (ref 3–12)
MUCUS: NORMAL
NITRITE, URINE: NEGATIVE
NRBC AUTOMATED: 0 PER 100 WBC
OTHER OBSERVATIONS UA: NORMAL
PDW BLD-RTO: 14.4 % (ref 11.8–14.4)
PH UA: 7 (ref 5–8)
PLATELET # BLD: 300 K/UL (ref 138–453)
PLATELET ESTIMATE: ABNORMAL
PMV BLD AUTO: 9.8 FL (ref 8.1–13.5)
POTASSIUM SERPL-SCNC: 4.6 MMOL/L (ref 3.7–5.3)
PROSTATE SPECIFIC ANTIGEN: 0.18 UG/L
PROTEIN UA: NEGATIVE
RBC # BLD: 4.89 M/UL (ref 4.21–5.77)
RBC # BLD: ABNORMAL 10*6/UL
RBC UA: NORMAL /HPF (ref 0–4)
RENAL EPITHELIAL, UA: NORMAL /HPF
SEG NEUTROPHILS: 66 % (ref 36–65)
SEGMENTED NEUTROPHILS ABSOLUTE COUNT: 3.97 K/UL (ref 1.5–8.1)
SODIUM BLD-SCNC: 138 MMOL/L (ref 135–144)
SPECIFIC GRAVITY UA: 1.03 (ref 1–1.03)
THYROXINE, FREE: 1.24 NG/DL (ref 0.93–1.7)
TOTAL PROTEIN: 6 G/DL (ref 6.4–8.3)
TRICHOMONAS: NORMAL
TRIGL SERPL-MCNC: 149 MG/DL
TSH SERPL DL<=0.05 MIU/L-ACNC: 0.81 MIU/L (ref 0.3–5)
TURBIDITY: ABNORMAL
URINE HGB: NEGATIVE
UROBILINOGEN, URINE: NORMAL
VITAMIN D 25-HYDROXY: 10.6 NG/ML (ref 30–100)
VLDLC SERPL CALC-MCNC: NORMAL MG/DL (ref 1–30)
WBC # BLD: 6 K/UL (ref 3.5–11.3)
WBC # BLD: ABNORMAL 10*3/UL
WBC UA: NORMAL /HPF (ref 0–5)
YEAST: NORMAL

## 2021-01-08 PROCEDURE — 36415 COLL VENOUS BLD VENIPUNCTURE: CPT

## 2021-01-08 PROCEDURE — 80053 COMPREHEN METABOLIC PANEL: CPT

## 2021-01-08 PROCEDURE — 82043 UR ALBUMIN QUANTITATIVE: CPT

## 2021-01-08 PROCEDURE — 84153 ASSAY OF PSA TOTAL: CPT

## 2021-01-08 PROCEDURE — 71046 X-RAY EXAM CHEST 2 VIEWS: CPT

## 2021-01-08 PROCEDURE — 85025 COMPLETE CBC W/AUTO DIFF WBC: CPT

## 2021-01-08 PROCEDURE — 82306 VITAMIN D 25 HYDROXY: CPT

## 2021-01-08 PROCEDURE — 83036 HEMOGLOBIN GLYCOSYLATED A1C: CPT

## 2021-01-08 PROCEDURE — 84443 ASSAY THYROID STIM HORMONE: CPT

## 2021-01-08 PROCEDURE — 82570 ASSAY OF URINE CREATININE: CPT

## 2021-01-08 PROCEDURE — 81001 URINALYSIS AUTO W/SCOPE: CPT

## 2021-01-08 PROCEDURE — 84439 ASSAY OF FREE THYROXINE: CPT

## 2021-01-08 PROCEDURE — 93005 ELECTROCARDIOGRAM TRACING: CPT | Performed by: NURSE PRACTITIONER

## 2021-01-08 PROCEDURE — 80061 LIPID PANEL: CPT

## 2021-01-09 LAB
EKG ATRIAL RATE: 80 BPM
EKG P AXIS: 61 DEGREES
EKG P-R INTERVAL: 148 MS
EKG Q-T INTERVAL: 362 MS
EKG QRS DURATION: 76 MS
EKG QTC CALCULATION (BAZETT): 417 MS
EKG R AXIS: 65 DEGREES
EKG T AXIS: 39 DEGREES
EKG VENTRICULAR RATE: 80 BPM

## 2021-03-24 ENCOUNTER — HOSPITAL ENCOUNTER (OUTPATIENT)
Age: 56
Discharge: HOME | End: 2021-03-24
Payer: COMMERCIAL

## 2021-03-24 LAB
ABSOLUTE EOS #: 0.04 K/UL (ref 0–0.44)
ABSOLUTE IMMATURE GRANULOCYTE: 0.04 K/UL (ref 0–0.3)
ABSOLUTE LYMPH #: 1.48 K/UL (ref 1.1–3.7)
ABSOLUTE MONO #: 0.59 K/UL (ref 0.1–1.2)
ALBUMIN SERPL-MCNC: 3.9 G/DL (ref 3.5–5.2)
ALBUMIN/GLOBULIN RATIO: 1.5 (ref 1–2.5)
ALP BLD-CCNC: 88 U/L (ref 40–129)
ALT SERPL-CCNC: 22 U/L (ref 5–41)
ANION GAP SERPL CALCULATED.3IONS-SCNC: 10 MMOL/L (ref 9–17)
AST SERPL-CCNC: 19 U/L
BASOPHILS # BLD: 1 % (ref 0–2)
BASOPHILS ABSOLUTE: 0.03 K/UL (ref 0–0.2)
BILIRUB SERPL-MCNC: 1.04 MG/DL (ref 0.3–1.2)
BUN BLDV-MCNC: 12 MG/DL (ref 6–20)
BUN/CREAT BLD: ABNORMAL (ref 9–20)
CALCIUM SERPL-MCNC: 9 MG/DL (ref 8.6–10.4)
CHLORIDE BLD-SCNC: 100 MMOL/L (ref 98–107)
CHOLESTEROL/HDL RATIO: 3.4
CHOLESTEROL: 224 MG/DL
CO2: 22 MMOL/L (ref 20–31)
CREAT SERPL-MCNC: 0.9 MG/DL (ref 0.7–1.2)
CREATININE URINE: 169.3 MG/DL (ref 39–259)
DIFFERENTIAL TYPE: ABNORMAL
EOSINOPHILS RELATIVE PERCENT: 1 % (ref 1–4)
ESTIMATED AVERAGE GLUCOSE: 306 MG/DL
GFR AFRICAN AMERICAN: >60 ML/MIN
GFR NON-AFRICAN AMERICAN: >60 ML/MIN
GFR SERPL CREATININE-BSD FRML MDRD: ABNORMAL ML/MIN/{1.73_M2}
GFR SERPL CREATININE-BSD FRML MDRD: ABNORMAL ML/MIN/{1.73_M2}
GLUCOSE BLD-MCNC: 277 MG/DL (ref 70–99)
HBA1C MFR BLD: 12.3 % (ref 4–6)
HCT VFR BLD CALC: 44.5 % (ref 40.7–50.3)
HDLC SERPL-MCNC: 65 MG/DL
HEMOGLOBIN: 15.2 G/DL (ref 13–17)
IMMATURE GRANULOCYTES: 1 %
LDL CHOLESTEROL: 117 MG/DL (ref 0–130)
LYMPHOCYTES # BLD: 26 % (ref 24–43)
MCH RBC QN AUTO: 28.6 PG (ref 25.2–33.5)
MCHC RBC AUTO-ENTMCNC: 34.2 G/DL (ref 28.4–34.8)
MCV RBC AUTO: 83.6 FL (ref 82.6–102.9)
MICROALBUMIN/CREAT 24H UR: <12 MG/L
MICROALBUMIN/CREAT UR-RTO: NORMAL MCG/MG CREAT
MONOCYTES # BLD: 10 % (ref 3–12)
NRBC AUTOMATED: 0 PER 100 WBC
PDW BLD-RTO: 14.6 % (ref 11.8–14.4)
PLATELET # BLD: 336 K/UL (ref 138–453)
PLATELET ESTIMATE: ABNORMAL
PMV BLD AUTO: 9.9 FL (ref 8.1–13.5)
POTASSIUM SERPL-SCNC: 4.2 MMOL/L (ref 3.7–5.3)
RBC # BLD: 5.32 M/UL (ref 4.21–5.77)
RBC # BLD: ABNORMAL 10*6/UL
SEG NEUTROPHILS: 61 % (ref 36–65)
SEGMENTED NEUTROPHILS ABSOLUTE COUNT: 3.49 K/UL (ref 1.5–8.1)
SODIUM BLD-SCNC: 132 MMOL/L (ref 135–144)
TOTAL PROTEIN: 6.5 G/DL (ref 6.4–8.3)
TRIGL SERPL-MCNC: 209 MG/DL
VLDLC SERPL CALC-MCNC: ABNORMAL MG/DL (ref 1–30)
WBC # BLD: 5.7 K/UL (ref 3.5–11.3)
WBC # BLD: ABNORMAL 10*3/UL

## 2021-03-24 PROCEDURE — 82043 UR ALBUMIN QUANTITATIVE: CPT

## 2021-03-24 PROCEDURE — 80053 COMPREHEN METABOLIC PANEL: CPT

## 2021-03-24 PROCEDURE — 82570 ASSAY OF URINE CREATININE: CPT

## 2021-03-24 PROCEDURE — 36415 COLL VENOUS BLD VENIPUNCTURE: CPT

## 2021-03-24 PROCEDURE — 85025 COMPLETE CBC W/AUTO DIFF WBC: CPT

## 2021-03-24 PROCEDURE — 83036 HEMOGLOBIN GLYCOSYLATED A1C: CPT

## 2021-03-24 PROCEDURE — 80061 LIPID PANEL: CPT

## 2021-07-15 DIAGNOSIS — Z12.11 COLON CANCER SCREENING: Primary | ICD-10-CM

## 2021-07-15 RX ORDER — POLYETHYLENE GLYCOL 3350 17 G/17G
POWDER, FOR SOLUTION ORAL
Qty: 238 G | Refills: 0 | Status: SHIPPED | OUTPATIENT
Start: 2021-07-15 | End: 2021-12-06

## 2021-07-15 RX ORDER — BISACODYL 5 MG
TABLET, DELAYED RELEASE (ENTERIC COATED) ORAL
Qty: 2 TABLET | Refills: 0 | Status: SHIPPED | OUTPATIENT
Start: 2021-07-15 | End: 2021-12-06

## 2021-07-15 NOTE — TELEPHONE ENCOUNTER
Called pt regarding referral. Last colon at [facility] in 2016; had polyps. Pt now scheduled for [facility], Dr Mehul Duncan, colon recall (new pt), 8/6/21 at 1130am, miralax, julissa, PAT call on 7/23/21 at 10am. Prep given to pt over phone and mailed to home address.

## 2021-07-23 ENCOUNTER — HOSPITAL ENCOUNTER (OUTPATIENT)
Dept: PREADMISSION TESTING | Age: 56
Discharge: HOME | End: 2021-07-27
Payer: COMMERCIAL

## 2021-07-23 VITALS — BODY MASS INDEX: 39.15 KG/M2 | HEIGHT: 65 IN | WEIGHT: 235 LBS

## 2021-07-23 NOTE — PROGRESS NOTES
Pre-op Instructions For Out-Patient Surgery    Medication Instructions:  · Please stop herbs and any supplements now (includes vitamins and minerals). · Please contact your surgeon and prescribing physician for pre-op instructions for any blood thinners. None    · If you have inhalers/aerosol treatments at home, please use them the morning of your surgery and bring the inhalers with you to the hospital.    · Please take the following medications the morning of your surgery with a sip of water:    Lisinopril & Pantoprazole     Surgery Instructions:  1. After midnight before surgery:  Do not eat or drink anything, including water, mints, gum, and hard candy. You may brush your teeth without swallowing. No smoking, chewing tobacco, or street drugs. 2. Please shower or bathe before surgery. 3. Please do not wear any cologne, lotion, powder, deodorant, jewelry, piercings, perfume, makeup, nail polish, hair accessories, or hair spray on the day of surgery. Wear loose comfortable clothing. 4. Leave your valuables at home. Bring a storage case for any glasses/contacts. 5. An adult who is responsible for you MUST drive you home and should be with you for the first 24 hours after surgery. 6. If having out-patient knee and foot surgeries, please arrange for planned crutches, walker, or wheelchair before arriving to the hospital.    The Day of Surgery:  · Arrive at [address] [place] [address] Surgery Entrance at the time directed by your surgeon and check in at the desk. · If you have a living will or healthcare power of , please bring a copy. · You will be taken to the pre-op holding area where you will be prepared for surgery. A physical assessment will be performed by a nurse practitioner or house officer.   Your IV will be started and you will meet your anesthesiologist.    · When you go to surgery, your family will be directed to the surgical waiting room,

## 2021-11-08 ENCOUNTER — HOSPITAL ENCOUNTER (EMERGENCY)
Age: 56
Discharge: HOME | End: 2021-11-08
Attending: EMERGENCY MEDICINE
Payer: COMMERCIAL

## 2021-11-08 VITALS
SYSTOLIC BLOOD PRESSURE: 159 MMHG | DIASTOLIC BLOOD PRESSURE: 81 MMHG | HEART RATE: 99 BPM | OXYGEN SATURATION: 99 % | RESPIRATION RATE: 18 BRPM | TEMPERATURE: 98.3 F

## 2021-11-08 DIAGNOSIS — M54.12 CERVICAL RADICULOPATHY: Primary | ICD-10-CM

## 2021-11-08 PROCEDURE — 99282 EMERGENCY DEPT VISIT SF MDM: CPT

## 2021-11-08 PROCEDURE — 6360000002 HC RX W HCPCS: Performed by: EMERGENCY MEDICINE

## 2021-11-08 PROCEDURE — 96372 THER/PROPH/DIAG INJ SC/IM: CPT

## 2021-11-08 PROCEDURE — 6370000000 HC RX 637 (ALT 250 FOR IP): Performed by: STUDENT IN AN ORGANIZED HEALTH CARE EDUCATION/TRAINING PROGRAM

## 2021-11-08 RX ORDER — KETOROLAC TROMETHAMINE 30 MG/ML
30 INJECTION, SOLUTION INTRAMUSCULAR; INTRAVENOUS ONCE
Status: COMPLETED | OUTPATIENT
Start: 2021-11-08 | End: 2021-11-08

## 2021-11-08 RX ORDER — IBUPROFEN 400 MG/1
400 TABLET ORAL EVERY 6 HOURS PRN
Qty: 28 TABLET | Refills: 0 | Status: SHIPPED | OUTPATIENT
Start: 2021-11-08 | End: 2021-12-06

## 2021-11-08 RX ORDER — KETOROLAC TROMETHAMINE 15 MG/ML
15 INJECTION, SOLUTION INTRAMUSCULAR; INTRAVENOUS ONCE
Status: DISCONTINUED | OUTPATIENT
Start: 2021-11-08 | End: 2021-11-08

## 2021-11-08 RX ORDER — ACETAMINOPHEN 500 MG
1000 TABLET ORAL ONCE
Status: COMPLETED | OUTPATIENT
Start: 2021-11-08 | End: 2021-11-08

## 2021-11-08 RX ORDER — ACETAMINOPHEN 500 MG
500 TABLET ORAL 4 TIMES DAILY PRN
Qty: 360 TABLET | Refills: 1 | Status: SHIPPED | OUTPATIENT
Start: 2021-11-08 | End: 2021-12-06

## 2021-11-08 RX ADMIN — KETOROLAC TROMETHAMINE 30 MG: 30 INJECTION, SOLUTION INTRAMUSCULAR at 11:59

## 2021-11-08 RX ADMIN — ACETAMINOPHEN 1000 MG: 500 TABLET ORAL at 11:59

## 2021-11-08 ASSESSMENT — ENCOUNTER SYMPTOMS
VOMITING: 0
ABDOMINAL PAIN: 0
DIARRHEA: 0
COLOR CHANGE: 0
COUGH: 0
BACK PAIN: 0
CONSTIPATION: 0
CHEST TIGHTNESS: 0
NAUSEA: 0
TROUBLE SWALLOWING: 0
SHORTNESS OF BREATH: 0

## 2021-11-08 ASSESSMENT — PAIN SCALES - GENERAL: PAINLEVEL_OUTOF10: 10

## 2021-11-08 NOTE — ED PROVIDER NOTES
[facility] ED  Emergency Department Encounter  EmergencyMedicine Resident     Pt Name:Jose Manuel Stephens  MRN: [id number]  [place] 1965  Date of evaluation: 11/8/21  PCP:  KRISTINA Mckeon CNP    This patient was evaluated in the Emergency Department for symptoms described in the history of present illness. The patient was evaluated in the context of the global COVID-19 pandemic, which necessitated consideration that the patient might be at risk for infection with the SARS-CoV-2 virus that causes COVID-19. Institutional protocols and algorithms that pertain to the evaluation of patients at risk for COVID-19 are in a state of rapid change based on information released by regulatory bodies including the CDC and federal and state organizations. These policies and algorithms were followed during the patient's care in the ED. CHIEF COMPLAINT       Chief Complaint   Patient presents with    Extremity Weakness     left arm pain with no known injury other than liftig a box       HISTORY OF PRESENT ILLNESS  (Location/Symptom, Timing/Onset, Context/Setting, Quality, Duration, Modifying Factors, Severity.)      Unique Villalobos is a 64 y.o. male  who presents with left-sided neck pain radiating down to right elbow. States he was lifting boxes a week ago and states the pain started around then. He does also state he injured his shoulder this summer. He denies any chest pain, shortness of breath, diaphoresis, loss of strength or sensation, fevers or chills. PAST MEDICAL / SURGICAL / SOCIAL / FAMILY HISTORY      has a past medical history of Acute depression, Back pain, DM2 (diabetes mellitus, type 2) ([address] Generalized OA, GERD (gastroesophageal reflux disease), HTN (hypertension), Schizoaffective disorder, bipolar type ([address] and Unspecified sleep apnea. has a past surgical history that includes Tonsillectomy and adenoidectomy and Colonoscopy.       Social History     Socioeconomic History    Marital status: Single     Spouse name: Not on file    Number of children: Not on file    Years of education: Not on file    Highest education level: Not on file   Occupational History    Not on file   Tobacco Use    Smoking status: Current Every Day Smoker     Packs/day: 0.50     Years: 41.00     Pack years: 20.50     Types: Cigarettes    Smokeless tobacco: Never Used   Vaping Use    Vaping Use: Never used   Substance and Sexual Activity    Alcohol use: No    Drug use: Yes     Types: Marijuana (Carliss Stair), Cocaine     Comment: stopped in 2000; Pt reports crack/cocaine use on an every other day basis    Sexual activity: Not on file   Other Topics Concern    Not on file   Social History Narrative    Not on file     Social Determinants of Health     Financial Resource Strain:     Difficulty of Paying Living Expenses: Not on file   Food Insecurity:     Worried About Running Out of Food in the Last Year: Not on file    Andrea of Food in the Last Year: Not on file   Transportation Needs:     Lack of Transportation (Medical): Not on file    Lack of Transportation (Non-Medical):  Not on file   Physical Activity:     Days of Exercise per Week: Not on file    Minutes of Exercise per Session: Not on file   Stress:     Feeling of Stress : Not on file   Social Connections:     Frequency of Communication with Friends and Family: Not on file    Frequency of Social Gatherings with Friends and Family: Not on file    Attends Sabianist Services: Not on file    Active Member of Clubs or Organizations: Not on file    Attends Club or Organization Meetings: Not on file    Marital Status: Not on file   Intimate Partner Violence:     Fear of Current or Ex-Partner: Not on file    Emotionally Abused: Not on file    Physically Abused: Not on file    Sexually Abused: Not on file   Housing Stability:     Unable to Pay for Housing in the Last Year: Not on file    Number of [place] in the Last Year: Not on file    Unstable Housing in the Last Year: Not on file       Family History   Problem Relation Age of Onset    Diabetes Mother        Allergies:  Patient has no known allergies. Home Medications:  Prior to Admission medications    Medication Sig Start Date End Date Taking? Authorizing Provider   acetaminophen (TYLENOL) 500 MG tablet Take 1 tablet by mouth 4 times daily as needed for Pain 11/8/21 11/15/21 Yes Enrique Stanley, DO   ibuprofen (IBU) 400 MG tablet Take 1 tablet by mouth every 6 hours as needed for Pain 11/8/21 11/15/21 Yes Enrique Stanley, DO   polyethylene glycol [facility]) 17 GM/SCOOP powder Follow instructions provided to you from physician's office. 7/15/21   Mone Jade MD   bisacodyl (BISACODYL) 5 MG EC tablet Follow instructions provided given by the physician's office. 7/15/21   Mone Jade MD   canagliflozin (INVOKANA) 100 MG TABS tablet Take 1 tablet by mouth every morning (before breakfast) 8/20/20   Viola Gonsalez MD   glimepiride (AMARYL) 4 MG tablet Take 1 tablet by mouth 2 times daily 8/19/20   Viola Gonsalez MD   lisinopril (PRINIVIL;ZESTRIL) 5 MG tablet Take 5 mg by mouth daily    Historical Provider, MD   metFORMIN (GLUCOPHAGE) 1000 MG tablet Take 1 tablet by mouth 2 times daily (with meals) 3/25/20   Ary Silver MD   QUEtiapine (SEROQUEL) 400 MG tablet Take 1 tablet by mouth nightly  Patient taking differently: Take 600 mg by mouth nightly  3/24/20   Ary Silver MD   traZODone (DESYREL) 50 MG tablet Take 50 mg by mouth nightly as needed for Sleep    Historical Provider, MD   gabapentin (NEURONTIN) 400 MG capsule Take 800 mg by mouth 3 times daily.     Historical Provider, MD   pantoprazole (PROTONIX) 40 MG tablet Take 1 tablet by mouth every morning (before breakfast) 1/12/20   Ary Silver MD   sertraline (ZOLOFT) 100 MG tablet Take 1 tablet by mouth daily 4/1/19   Izabella Mcbride MD       REVIEW OF SYSTEMS    (2-9 systems for level 4, 10 or more for level 5)      Review of Systems   Constitutional: Negative for appetite change, fatigue and fever. HENT: Negative for congestion and trouble swallowing. Respiratory: Negative for cough, chest tightness and shortness of breath. Cardiovascular: Negative for chest pain and leg swelling. Gastrointestinal: Negative for abdominal pain, constipation, diarrhea, nausea and vomiting. Genitourinary: Negative for dysuria. Musculoskeletal: Positive for neck pain. Negative for back pain. Shoulder pain   Skin: Negative for color change and rash. Neurological: Negative for dizziness, weakness and headaches. PHYSICAL EXAM   (up to 7 for level 4, 8 or more for level 5)      INITIAL VITALS:   BP (!) 159/81   Pulse 99   Temp 98.3 °F (36.8 °C)   Resp 18   SpO2 99%     Physical Exam  Constitutional:       General: He is not in acute distress. HENT:      Head: Normocephalic and atraumatic. Nose: No congestion. Eyes:      General: No scleral icterus. Pupils: Pupils are equal, round, and reactive to light. Cardiovascular:      Rate and Rhythm: Normal rate. Heart sounds: No murmur heard. No friction rub. No gallop. Pulmonary:      Breath sounds: No wheezing, rhonchi or rales. Abdominal:      General: Abdomen is flat. There is no distension. Palpations: Abdomen is soft. Tenderness: There is no abdominal tenderness. There is no guarding or rebound. Musculoskeletal:         General: No swelling. Cervical back: Normal range of motion. Comments: +empty can test. Full active/passive ROM U/L extremities   Skin:     Findings: No rash. Neurological:      General: No focal deficit present. Mental Status: He is oriented to person, place, and time. Cranial Nerves: No cranial nerve deficit.       Comments: Positive Spurling's test         DIFFERENTIAL  DIAGNOSIS     PLAN (LABS / IMAGING / EKG):  No orders of the defined types were placed in this encounter. MEDICATIONS ORDERED:  Orders Placed This Encounter   Medications    DISCONTD: ketorolac (TORADOL) injection 15 mg    acetaminophen (TYLENOL) tablet 1,000 mg    acetaminophen (TYLENOL) 500 MG tablet     Sig: Take 1 tablet by mouth 4 times daily as needed for Pain     Dispense:  360 tablet     Refill:  1    ibuprofen (IBU) 400 MG tablet     Sig: Take 1 tablet by mouth every 6 hours as needed for Pain     Dispense:  28 tablet     Refill:  0    ketorolac (TORADOL) injection 30 mg       DDX:  Otitis media, otitis externa    DIAGNOSTIC RESULTS / EMERGENCY DEPARTMENT COURSE / MDM   LAB RESULTS:  No results found for this visit on 11/08/21. RADIOLOGY:  No results found. EKG Interpretation  None    EMERGENCY DEPARTMENT COURSE:  ED Course as of 11/08/21 1224   Mon Nov 08, 2021   1214 Evaluated bedside. Patient's physical exam consistent with radiculopathy. She does not have any associated symptoms that would make me concerned for cardiac cause of this pain [ZE]      ED Course User Index  [ZE] Ade Obrien DO       PROCEDURES:  Procedures     CONSULTS:  None    CRITICAL CARE:  None    MDM  Patient evaluated for neck and left arm pain. Patient symptoms consistent with cervical radiculopathy. Patient given Tylenol and Toradol with pain relief. Also sent home with Tylenol and ibuprofen. Patient given strict return precautions. Also given follow-up instructions with neurosurgery    FINAL IMPRESSION      1.  Cervical radiculopathy      DISPOSITION / PLAN     DISPOSITION Decision To Discharge 11/08/2021 12:14:52 PM      PATIENT REFERRED TO:  Ashu Hughes, [address] [facility] [address] [facility] [address]  [facility] [address]  [phone]      Follow-up for evaluation of cervical radiculopathy      DISCHARGE MEDICATIONS:  New Prescriptions    ACETAMINOPHEN (TYLENOL) 500 MG TABLET    Take 1 tablet by mouth 4 times daily as needed for Pain    IBUPROFEN (IBU) 400 MG TABLET    Take 1 tablet by mouth every 6 hours as needed for Pain       Aj Arias DO  Emergency Medicine Resident    (Please note that portions of thisnote were completed with a voice recognition program.  Efforts were made to edit the dictations but occasionally words are mis-transcribed.)        Delilah Bowers DO  Resident  11/08/21 0470

## 2021-11-08 NOTE — ED PROVIDER NOTES
[facility] ED  eMERGENCY dEPARTMENT eNCOUnter   Attending Attestation     Pt Name: Kandi Zepeda  MRN: [id number]  [place] 1965  Date of evaluation: 11/8/21       Kandi Zepeda is a 64 y.o. male who presents with Extremity Weakness (left arm pain with no known injury other than liftig a box)      History: Patient presents with left neck and shoulder pain. Patient has no injury. Patient said it hurts worse when he lays on on it or moves it. Patient says there is no weakness at this time. Exam: Heart rate and rhythm are regular. Lungs are clear to station bilaterally. Abdomen is soft, nontender. Patient has normal strength in the upper extremities. Patient has normal push pull, normal extension, normal abduction and abduction. Normal  strength. Concern for radiculopathy. Plan to have patient follow-up with neurosurgery clinic. We will treat the patient's symptoms in the meantime. Patient understands. I performed a history and physical examination of the patient and discussed management with the resident. I reviewed the residents note and agree with the documented findings and plan of care. Any areas of disagreement are noted on the chart. I was personally present for the key portions of any procedures. I have documented in the chart those procedures where I was not present during the key portions. I have personally reviewed all images and agree with the resident's interpretation. I have reviewed the emergency nurses triage note. I agree with the chief complaint, past medical history, past surgical history, allergies, medications, social and family history as documented unless otherwise noted below. Documentation of the HPI, Physical Exam and Medical Decision Making performed by medical students or scribes is based on my personal performance of the HPI, PE and MDM.  For Phys Assistant/ Nurse Practitioner cases/documentation I have had a face to face evaluation of this patient and have completed at least one if not all key elements of the E/M (history, physical exam, and MDM). Additional findings are as noted. For APC cases I have personally evaluated and examined the patient in conjunction with the APC and agree with the treatment plan and disposition of the patient as recorded by the APC.     Pierce Roberson MD  Attending Emergency  Physician       Adelaida Awan MD  11/08/21 9093

## 2021-11-08 NOTE — ED NOTES
Bed: 42  Expected date:   Expected time:   Means of arrival:   Comments:     Mortimer Knee, RN  11/08/21 1112

## 2021-12-06 ENCOUNTER — HOSPITAL ENCOUNTER (INPATIENT)
Age: 56
LOS: 4 days | Discharge: HOME | DRG: 750 | End: 2021-12-10
Attending: EMERGENCY MEDICINE | Admitting: PSYCHIATRY & NEUROLOGY
Payer: COMMERCIAL

## 2021-12-06 DIAGNOSIS — R45.851 SUICIDAL IDEATION: Primary | ICD-10-CM

## 2021-12-06 PROBLEM — F32.A DEPRESSION WITH SUICIDAL IDEATION: Status: ACTIVE | Noted: 2021-12-06

## 2021-12-06 LAB
ABSOLUTE EOS #: 0.1 K/UL (ref 0–0.4)
ABSOLUTE IMMATURE GRANULOCYTE: ABNORMAL K/UL (ref 0–0.3)
ABSOLUTE LYMPH #: 1.4 K/UL (ref 1–4.8)
ABSOLUTE MONO #: 0.4 K/UL (ref 0.1–1.3)
ACETAMINOPHEN LEVEL: <5 UG/ML (ref 10–30)
ALBUMIN SERPL-MCNC: 3.4 G/DL (ref 3.5–5.2)
ALBUMIN/GLOBULIN RATIO: ABNORMAL (ref 1–2.5)
ALP BLD-CCNC: 82 U/L (ref 40–129)
ALT SERPL-CCNC: 22 U/L (ref 5–41)
AMPHETAMINE SCREEN URINE: NEGATIVE
ANION GAP SERPL CALCULATED.3IONS-SCNC: 9 MMOL/L (ref 9–17)
AST SERPL-CCNC: 15 U/L
BARBITURATE SCREEN URINE: NEGATIVE
BASOPHILS # BLD: 1 % (ref 0–2)
BASOPHILS ABSOLUTE: 0 K/UL (ref 0–0.2)
BENZODIAZEPINE SCREEN, URINE: NEGATIVE
BILIRUB SERPL-MCNC: 0.31 MG/DL (ref 0.3–1.2)
BUN BLDV-MCNC: 15 MG/DL (ref 6–20)
BUN/CREAT BLD: ABNORMAL (ref 9–20)
BUPRENORPHINE URINE: ABNORMAL
CALCIUM SERPL-MCNC: 9 MG/DL (ref 8.6–10.4)
CANNABINOID SCREEN URINE: NEGATIVE
CHLORIDE BLD-SCNC: 105 MMOL/L (ref 98–107)
CO2: 26 MMOL/L (ref 20–31)
COCAINE METABOLITE, URINE: POSITIVE
CREAT SERPL-MCNC: 1.11 MG/DL (ref 0.7–1.2)
DIFFERENTIAL TYPE: ABNORMAL
EOSINOPHILS RELATIVE PERCENT: 1 % (ref 0–4)
ETHANOL PERCENT: <0.01 %
ETHANOL: <10 MG/DL
GFR AFRICAN AMERICAN: >60 ML/MIN
GFR NON-AFRICAN AMERICAN: >60 ML/MIN
GFR SERPL CREATININE-BSD FRML MDRD: ABNORMAL ML/MIN/{1.73_M2}
GFR SERPL CREATININE-BSD FRML MDRD: ABNORMAL ML/MIN/{1.73_M2}
GLUCOSE BLD-MCNC: 100 MG/DL (ref 75–110)
GLUCOSE BLD-MCNC: 295 MG/DL (ref 70–99)
GLUCOSE BLD-MCNC: 307 MG/DL (ref 75–110)
GLUCOSE BLD-MCNC: 310 MG/DL (ref 75–110)
HCT VFR BLD CALC: 44.6 % (ref 41–53)
HEMOGLOBIN: 15 G/DL (ref 13.5–17.5)
IMMATURE GRANULOCYTES: ABNORMAL %
LYMPHOCYTES # BLD: 24 % (ref 24–44)
MCH RBC QN AUTO: 29.4 PG (ref 26–34)
MCHC RBC AUTO-ENTMCNC: 33.6 G/DL (ref 31–37)
MCV RBC AUTO: 87.5 FL (ref 80–100)
MDMA URINE: ABNORMAL
METHADONE SCREEN, URINE: NEGATIVE
METHAMPHETAMINE, URINE: ABNORMAL
MONOCYTES # BLD: 7 % (ref 1–7)
NRBC AUTOMATED: ABNORMAL PER 100 WBC
OPIATES, URINE: NEGATIVE
OXYCODONE SCREEN URINE: NEGATIVE
PDW BLD-RTO: 16.5 % (ref 11.5–14.9)
PHENCYCLIDINE, URINE: NEGATIVE
PLATELET # BLD: 334 K/UL (ref 150–450)
PLATELET ESTIMATE: ABNORMAL
PMV BLD AUTO: 7.5 FL (ref 6–12)
POTASSIUM SERPL-SCNC: 4.3 MMOL/L (ref 3.7–5.3)
PROPOXYPHENE, URINE: ABNORMAL
RBC # BLD: 5.1 M/UL (ref 4.5–5.9)
RBC # BLD: ABNORMAL 10*6/UL
SALICYLATE LEVEL: <1 MG/DL (ref 3–10)
SARS-COV-2, RAPID: NOT DETECTED
SEG NEUTROPHILS: 67 % (ref 36–66)
SEGMENTED NEUTROPHILS ABSOLUTE COUNT: 4.1 K/UL (ref 1.3–9.1)
SODIUM BLD-SCNC: 140 MMOL/L (ref 135–144)
SPECIMEN DESCRIPTION: NORMAL
TEST INFORMATION: ABNORMAL
TOTAL PROTEIN: 5.7 G/DL (ref 6.4–8.3)
TRICYCLIC ANTIDEPRESSANTS, UR: ABNORMAL
WBC # BLD: 6 K/UL (ref 3.5–11)
WBC # BLD: ABNORMAL 10*3/UL

## 2021-12-06 PROCEDURE — 87635 SARS-COV-2 COVID-19 AMP PRB: CPT

## 2021-12-06 PROCEDURE — 83036 HEMOGLOBIN GLYCOSYLATED A1C: CPT

## 2021-12-06 PROCEDURE — 96372 THER/PROPH/DIAG INJ SC/IM: CPT

## 2021-12-06 PROCEDURE — 80053 COMPREHEN METABOLIC PANEL: CPT

## 2021-12-06 PROCEDURE — 80143 DRUG ASSAY ACETAMINOPHEN: CPT

## 2021-12-06 PROCEDURE — 85025 COMPLETE CBC W/AUTO DIFF WBC: CPT

## 2021-12-06 PROCEDURE — 80307 DRUG TEST PRSMV CHEM ANLYZR: CPT

## 2021-12-06 PROCEDURE — G0480 DRUG TEST DEF 1-7 CLASSES: HCPCS

## 2021-12-06 PROCEDURE — 80179 DRUG ASSAY SALICYLATE: CPT

## 2021-12-06 PROCEDURE — 99285 EMERGENCY DEPT VISIT HI MDM: CPT

## 2021-12-06 PROCEDURE — 36415 COLL VENOUS BLD VENIPUNCTURE: CPT

## 2021-12-06 PROCEDURE — 82947 ASSAY GLUCOSE BLOOD QUANT: CPT

## 2021-12-06 PROCEDURE — 1240000000 HC EMOTIONAL WELLNESS R&B

## 2021-12-06 PROCEDURE — 6370000000 HC RX 637 (ALT 250 FOR IP): Performed by: EMERGENCY MEDICINE

## 2021-12-06 PROCEDURE — 6370000000 HC RX 637 (ALT 250 FOR IP): Performed by: PSYCHIATRY & NEUROLOGY

## 2021-12-06 RX ORDER — QUETIAPINE FUMARATE 300 MG/1
600 TABLET, FILM COATED ORAL NIGHTLY
Status: ON HOLD | COMMUNITY
End: 2021-12-10 | Stop reason: HOSPADM

## 2021-12-06 RX ORDER — MELOXICAM 15 MG/1
15 TABLET ORAL DAILY
Status: ON HOLD | COMMUNITY
End: 2021-12-10 | Stop reason: HOSPADM

## 2021-12-06 RX ORDER — TRAZODONE HYDROCHLORIDE 50 MG/1
50 TABLET ORAL NIGHTLY PRN
Status: DISCONTINUED | OUTPATIENT
Start: 2021-12-06 | End: 2021-12-07

## 2021-12-06 RX ORDER — POLYETHYLENE GLYCOL 3350 17 G/17G
17 POWDER, FOR SOLUTION ORAL DAILY PRN
Status: DISCONTINUED | OUTPATIENT
Start: 2021-12-06 | End: 2021-12-10 | Stop reason: HOSPADM

## 2021-12-06 RX ORDER — PRAVASTATIN SODIUM 20 MG
20 TABLET ORAL DAILY
COMMUNITY

## 2021-12-06 RX ORDER — NICOTINE 21 MG/24HR
1 PATCH, TRANSDERMAL 24 HOURS TRANSDERMAL DAILY
Status: DISCONTINUED | OUTPATIENT
Start: 2021-12-06 | End: 2021-12-06

## 2021-12-06 RX ORDER — MAGNESIUM HYDROXIDE/ALUMINUM HYDROXICE/SIMETHICONE 120; 1200; 1200 MG/30ML; MG/30ML; MG/30ML
30 SUSPENSION ORAL EVERY 6 HOURS PRN
Status: DISCONTINUED | OUTPATIENT
Start: 2021-12-06 | End: 2021-12-10 | Stop reason: HOSPADM

## 2021-12-06 RX ORDER — IBUPROFEN 400 MG/1
400 TABLET ORAL EVERY 6 HOURS PRN
Status: DISCONTINUED | OUTPATIENT
Start: 2021-12-06 | End: 2021-12-10 | Stop reason: HOSPADM

## 2021-12-06 RX ORDER — ACETAMINOPHEN 325 MG/1
650 TABLET ORAL EVERY 4 HOURS PRN
Status: DISCONTINUED | OUTPATIENT
Start: 2021-12-06 | End: 2021-12-10 | Stop reason: HOSPADM

## 2021-12-06 RX ORDER — HYDROXYZINE 50 MG/1
50 TABLET, FILM COATED ORAL 3 TIMES DAILY PRN
COMMUNITY

## 2021-12-06 RX ORDER — HYDROXYZINE 50 MG/1
50 TABLET, FILM COATED ORAL 3 TIMES DAILY PRN
Status: DISCONTINUED | OUTPATIENT
Start: 2021-12-06 | End: 2021-12-10 | Stop reason: HOSPADM

## 2021-12-06 RX ORDER — BUPRENORPHINE AND NALOXONE 8; 2 MG/1; MG/1
1 FILM, SOLUBLE BUCCAL; SUBLINGUAL 2 TIMES DAILY
COMMUNITY

## 2021-12-06 RX ADMIN — INSULIN LISPRO 8 UNITS: 100 INJECTION, SOLUTION INTRAVENOUS; SUBCUTANEOUS at 15:06

## 2021-12-06 RX ADMIN — HYDROXYZINE HYDROCHLORIDE 50 MG: 50 TABLET, FILM COATED ORAL at 20:18

## 2021-12-06 RX ADMIN — INSULIN LISPRO 8 UNITS: 100 INJECTION, SOLUTION INTRAVENOUS; SUBCUTANEOUS at 12:57

## 2021-12-06 RX ADMIN — ACETAMINOPHEN 650 MG: 325 TABLET, FILM COATED ORAL at 20:18

## 2021-12-06 RX ADMIN — TRAZODONE HYDROCHLORIDE 50 MG: 50 TABLET ORAL at 20:18

## 2021-12-06 ASSESSMENT — PAIN DESCRIPTION - DESCRIPTORS
DESCRIPTORS: SHARP
DESCRIPTORS: ACHING;BURNING

## 2021-12-06 ASSESSMENT — PAIN DESCRIPTION - LOCATION
LOCATION: BACK
LOCATION: BACK
LOCATION: GENERALIZED

## 2021-12-06 ASSESSMENT — SLEEP AND FATIGUE QUESTIONNAIRES
DIFFICULTY ARISING: NO
DIFFICULTY STAYING ASLEEP: YES
DIFFICULTY FALLING ASLEEP: YES
RESTFUL SLEEP: NO
SLEEP PATTERN: DIFFICULTY FALLING ASLEEP;DISTURBED/INTERRUPTED SLEEP;RESTLESSNESS
DO YOU HAVE DIFFICULTY SLEEPING: YES
AVERAGE NUMBER OF SLEEP HOURS: 2
DO YOU USE A SLEEP AID: NO

## 2021-12-06 ASSESSMENT — PAIN DESCRIPTION - ONSET: ONSET: ON-GOING

## 2021-12-06 ASSESSMENT — PAIN DESCRIPTION - ORIENTATION
ORIENTATION: LOWER
ORIENTATION: LOWER

## 2021-12-06 ASSESSMENT — LIFESTYLE VARIABLES
HISTORY_ALCOHOL_USE: NO
HISTORY_ALCOHOL_USE: NO

## 2021-12-06 ASSESSMENT — PAIN SCALES - GENERAL
PAINLEVEL_OUTOF10: 7
PAINLEVEL_OUTOF10: 4
PAINLEVEL_OUTOF10: 0
PAINLEVEL_OUTOF10: 3

## 2021-12-06 ASSESSMENT — ENCOUNTER SYMPTOMS
COUGH: 0
BACK PAIN: 0
ABDOMINAL PAIN: 0

## 2021-12-06 ASSESSMENT — PAIN DESCRIPTION - PAIN TYPE
TYPE: CHRONIC PAIN

## 2021-12-06 ASSESSMENT — PAIN - FUNCTIONAL ASSESSMENT: PAIN_FUNCTIONAL_ASSESSMENT: ACTIVITIES ARE NOT PREVENTED

## 2021-12-06 NOTE — ED NOTES
[facility] called and Dr Manjit Luna to admit under MDD with SI and voluntary and bed request faxed to [facility].

## 2021-12-06 NOTE — ED NOTES
Per [facility] patient to be assigned room on UNIT A - will be after shift change due to back to back admissions.

## 2021-12-06 NOTE — ED NOTES
Patient ate lunch tray even though he was discouraged due to he has a high sugar of 31 -0 patient reports he has not eaten in several days and is hungry.

## 2021-12-06 NOTE — PROGRESS NOTES
Medication History completed:    New medications: cholecalciferol, trazodone, pravastatin, meloxicam, hydroxyzine, Suboxone    Medications discontinued: polyethylene glycol, ibuprofen, glimepiride, Invokana, bisacodyl, acetaminophen    Changes to dosing:   Quetiapine changed to 600 mg (two 300 mg tablets) nightly  Lisinopril changed to 10 mg daily  Gabapentin changed to 300 mg three times daily    Stated allergies: NKDA    Other pertinent information: Medications confirmed with [facility]. The patient is about a month overdue on most medications. He is current on his Suboxone (filled 12/3/21 for 8 days), gabapentin (filled 12/1/21 for 30 days), hydroxyzine, sertraline, and quetiapine.      Thank you,  Jonah Jiménez, PharmD, BCPS  [phone]

## 2021-12-06 NOTE — ED NOTES
Provisional Diagnosis:   Schizoaffective disorder / Diabetic II     Psychosocial and Contextual Factors:  Patient stopped taking medications over a week ago     C-SSRS Summary:  Last [facility] admission August 2020     Patient: X  Family:   Agency: Patient is linked with [facility]      Substance Abuse: Patient denies but was positive for cocaine in system      Present Suicidal Behavior:  Patient reports suicidal ideation with a plan to stab himself with a knife and that the voices are telling him to do it. Verbal: X     Attempt:     Past Suicidal Behavior: only ideations    Verbal: X     Attempt:        Self-Injurious/Self-Mutilation: Patient denies     Trauma Identified:  death of father in 2020    Hx of Violence: no hx         Protective Factors:  Patient reports income and stable housing    Risk Factors:  Patient has poor insight and poor coping skill/problem solving skills, poor compliance with medications     Clinical Summary:  Amadeo Sosa is a single 64 y.o.  male who presents to the ED for current SI -suicidal ideation with a plan to stab himself with a knife and AH - Auditory hallucinations. Patient reports auditory command hallucinations telling him kill himself and told him to stop taking his medications over a week ago. Patient cannot identify trigger that caused his voices to come back. Patient reports the voices have become louder and more aggressive since he stopped taking his psychotropic medications about a week and a half ago because the voices told him and he has not been compliant with his diabetic medication. Patient reports he has not slept in or ate in 2 days he denies any HI VH. Patient reports depressed mood with increased feelings of hopelessness, helplessness and sadness. Low energy and fatigue. Patient reports he has housing and states that he lives by himself. Patient is linked with [facility] for outpatient mental health services. Patient denies any current or past AOD use or legal issues. Patient is willing to sign in voluntary. Level of Care Disposition:  to be medically cleared and psych eval to be completed.

## 2021-12-06 NOTE — ED PROVIDER NOTES
TONSILLECTOMY AND ADENOIDECTOMY         CURRENT MEDICATIONS       Current Discharge Medication List      CONTINUE these medications which have NOT CHANGED    Details   buprenorphine-naloxone (SUBOXONE) 8-2 MG FILM SL film Place 1 Film under the tongue 2 times daily. Indications: last fill 12/3/21 for an 8 day supply      hydrOXYzine (ATARAX) 50 MG tablet Take 50 mg by mouth 3 times daily as needed for Anxiety      QUEtiapine (SEROQUEL) 300 MG tablet Take 600 mg by mouth nightly      pravastatin (PRAVACHOL) 20 MG tablet Take 20 mg by mouth daily      meloxicam (MOBIC) 15 MG tablet Take 15 mg by mouth daily      vitamin D (CHOLECALCIFEROL) 25 MCG (1000 UT) TABS tablet Take 1,000 Units by mouth daily      lisinopril (PRINIVIL;ZESTRIL) 10 MG tablet Take 10 mg by mouth daily       metFORMIN (GLUCOPHAGE) 1000 MG tablet Take 1 tablet by mouth 2 times daily (with meals)  Qty: 60 tablet, Refills: 3      traZODone (DESYREL) 150 MG tablet Take 150 mg by mouth nightly as needed for Sleep       gabapentin (NEURONTIN) 800 MG tablet Take 800 mg by mouth 3 times daily. Indications: last fill 12/1/21 for a 30 day supply       pantoprazole (PROTONIX) 40 MG tablet Take 1 tablet by mouth every morning (before breakfast)  Qty: 30 tablet, Refills: 3      sertraline (ZOLOFT) 100 MG tablet Take 1 tablet by mouth daily  Qty: 30 tablet, Refills: 0             ALLERGIES     has No Known Allergies. FAMILY HISTORY     He indicated that his mother is alive. SOCIAL HISTORY      reports that he has been smoking cigarettes. He has a 20.50 pack-year smoking history. He has never used smokeless tobacco. He reports current drug use. Drugs: Marijuana (Weed) and Cocaine. He reports that he does not drink alcohol.     PHYSICAL EXAM     INITIAL VITALS: /85   Pulse 78   Temp 97.8 °F (36.6 °C) (Oral)   Resp 14   Ht 5' 5\" (1.651 m)   Wt 230 lb (104.3 kg)   SpO2 100%   BMI 38.27 kg/m²   Gen: NAD  Head: Normocephalic, atraumatic  Eye: Pupils equal round reactive to light, no conjunctivitis  Heart: Regular rate and rhythm no murmurs  Lungs: Clear to auscultation bilaterally, no respiratory distress  Chest wall: No crepitus, no tenderness palpation  Abdomen: Soft, nontender, nondistended, with no peritoneal signs  Skin: No diaphoresis. no lacerations. Neurologic: Patient is alert and oriented x3, motor and sensation is intact in all 4 extremities, speech is fluent  Extremities: Full range of motion, no cyanosis, no edema, no signs of trauma, no tenderness to palpation    MEDICAL DECISION MAKING:     MDM  64 y.o. male with depression, suicidal thoughts, suicidal plan. The patient does not appear intoxicated. The patient is showing no signs of any acute active toxidrome. The patient denies any overdose attempt or  medical complaints at this time. We'll screen for any acetaminophen or salicylate ingestion, we'll check baseline renal function, liver function, a drug screen, cbc and reassess. Emergency Department course:  Laboratory studies unremarkable except for an elevated glucose without signs of DKA. The patient was treated with insulin until his glucose normalized and he was able to eat. His drug screen was also positive for cocaine. The patient is medically stable for psychiatric evaluation. DIAGNOSTIC RESULTS     LABS: All lab results were reviewed by myself, and all abnormals are listed below.   Labs Reviewed   CBC WITH AUTO DIFFERENTIAL - Abnormal; Notable for the following components:       Result Value    RDW 16.5 (*)     Seg Neutrophils 67 (*)     All other components within normal limits   COMPREHENSIVE METABOLIC PANEL - Abnormal; Notable for the following components:    Glucose 295 (*)     Total Protein 5.7 (*)     Albumin 3.4 (*)     All other components within normal limits   URINE DRUG SCREEN - Abnormal; Notable for the following components:    Cocaine Metabolite, Urine POSITIVE (*)     All other components within normal limits   ACETAMINOPHEN LEVEL - Abnormal; Notable for the following components:    Acetaminophen Level <5 (*)     All other components within normal limits   SALICYLATE LEVEL - Abnormal; Notable for the following components:    Salicylate Lvl <1 (*)     All other components within normal limits   POC GLUCOSE FINGERSTICK - Abnormal; Notable for the following components:    POC Glucose 310 (*)     All other components within normal limits   POC GLUCOSE FINGERSTICK - Abnormal; Notable for the following components:    POC Glucose 307 (*)     All other components within normal limits   POC GLUCOSE FINGERSTICK - Abnormal; Notable for the following components:    POC Glucose 232 (*)     All other components within normal limits   POC GLUCOSE FINGERSTICK - Abnormal; Notable for the following components:    POC Glucose 333 (*)     All other components within normal limits   COVID-19, RAPID   ETHANOL   HEMOGLOBIN A1C   POC GLUCOSE FINGERSTICK   POCT GLUCOSE   POCT GLUCOSE   POCT GLUCOSE   POCT GLUCOSE   POCT GLUCOSE       EMERGENCY DEPARTMENT COURSE:   Vitals:    Vitals:    12/06/21 1005 12/06/21 1941 12/07/21 0730   BP: (!) 127/93 110/72 123/85   Pulse: 84 88 78   Resp: 18 16 14   Temp: 98 °F (36.7 °C) 98.6 °F (37 °C) 97.8 °F (36.6 °C)   TempSrc: Oral Oral Oral   SpO2: 100% 100%    Weight: 230 lb (104.3 kg) 230 lb (104.3 kg)    Height: 5' 5\" (1.651 m) 5' 5\" (1.651 m)        The patient was given the following medications while in the emergency department:  Orders Placed This Encounter   Medications    insulin lispro (HUMALOG) injection vial 8 Units    insulin lispro (HUMALOG) injection vial 8 Units    acetaminophen (TYLENOL) tablet 650 mg    ibuprofen (ADVIL;MOTRIN) tablet 400 mg    aluminum & magnesium hydroxide-simethicone (MAALOX) 200-200-20 MG/5ML suspension 30 mL    DISCONTD: traZODone (DESYREL) tablet 50 mg    polyethylene glycol (GLYCOLAX) packet 17 g    DISCONTD: nicotine (NICODERM CQ) 14 MG/24HR 1

## 2021-12-07 LAB
ESTIMATED AVERAGE GLUCOSE: 200 MG/DL
GLUCOSE BLD-MCNC: 160 MG/DL (ref 75–110)
GLUCOSE BLD-MCNC: 212 MG/DL (ref 75–110)
GLUCOSE BLD-MCNC: 232 MG/DL (ref 75–110)
GLUCOSE BLD-MCNC: 333 MG/DL (ref 75–110)
HBA1C MFR BLD: 8.6 % (ref 4–6)

## 2021-12-07 PROCEDURE — 1240000000 HC EMOTIONAL WELLNESS R&B

## 2021-12-07 PROCEDURE — 6370000000 HC RX 637 (ALT 250 FOR IP): Performed by: PSYCHIATRY & NEUROLOGY

## 2021-12-07 PROCEDURE — 99254 IP/OBS CNSLTJ NEW/EST MOD 60: CPT | Performed by: INTERNAL MEDICINE

## 2021-12-07 PROCEDURE — 6370000000 HC RX 637 (ALT 250 FOR IP)

## 2021-12-07 PROCEDURE — APPSS60 APP SPLIT SHARED TIME 46-60 MINUTES

## 2021-12-07 PROCEDURE — 82947 ASSAY GLUCOSE BLOOD QUANT: CPT

## 2021-12-07 PROCEDURE — 90792 PSYCH DIAG EVAL W/MED SRVCS: CPT | Performed by: PSYCHIATRY & NEUROLOGY

## 2021-12-07 PROCEDURE — 6370000000 HC RX 637 (ALT 250 FOR IP): Performed by: INTERNAL MEDICINE

## 2021-12-07 RX ORDER — BUPRENORPHINE AND NALOXONE 8; 2 MG/1; MG/1
1 FILM, SOLUBLE BUCCAL; SUBLINGUAL 2 TIMES DAILY
Status: DISCONTINUED | OUTPATIENT
Start: 2021-12-07 | End: 2021-12-10 | Stop reason: HOSPADM

## 2021-12-07 RX ORDER — QUETIAPINE FUMARATE 300 MG/1
300 TABLET, FILM COATED ORAL NIGHTLY
Status: DISCONTINUED | OUTPATIENT
Start: 2021-12-07 | End: 2021-12-09

## 2021-12-07 RX ORDER — PANTOPRAZOLE SODIUM 40 MG/1
40 TABLET, DELAYED RELEASE ORAL
Status: DISCONTINUED | OUTPATIENT
Start: 2021-12-08 | End: 2021-12-10 | Stop reason: HOSPADM

## 2021-12-07 RX ORDER — GLIPIZIDE 5 MG/1
5 TABLET ORAL
Status: DISCONTINUED | OUTPATIENT
Start: 2021-12-07 | End: 2021-12-09

## 2021-12-07 RX ORDER — TRAZODONE HYDROCHLORIDE 150 MG/1
150 TABLET ORAL NIGHTLY PRN
Status: DISCONTINUED | OUTPATIENT
Start: 2021-12-07 | End: 2021-12-10 | Stop reason: HOSPADM

## 2021-12-07 RX ORDER — DEXTROSE MONOHYDRATE 25 G/50ML
12.5 INJECTION, SOLUTION INTRAVENOUS PRN
Status: DISCONTINUED | OUTPATIENT
Start: 2021-12-07 | End: 2021-12-10 | Stop reason: HOSPADM

## 2021-12-07 RX ORDER — PRAVASTATIN SODIUM 20 MG
20 TABLET ORAL DAILY
Status: DISCONTINUED | OUTPATIENT
Start: 2021-12-07 | End: 2021-12-10 | Stop reason: HOSPADM

## 2021-12-07 RX ORDER — LISINOPRIL 10 MG/1
10 TABLET ORAL DAILY
Status: DISCONTINUED | OUTPATIENT
Start: 2021-12-07 | End: 2021-12-10 | Stop reason: HOSPADM

## 2021-12-07 RX ORDER — NICOTINE POLACRILEX 4 MG
15 LOZENGE BUCCAL PRN
Status: DISCONTINUED | OUTPATIENT
Start: 2021-12-07 | End: 2021-12-10 | Stop reason: HOSPADM

## 2021-12-07 RX ORDER — DEXTROSE MONOHYDRATE 50 MG/ML
100 INJECTION, SOLUTION INTRAVENOUS PRN
Status: DISCONTINUED | OUTPATIENT
Start: 2021-12-07 | End: 2021-12-10 | Stop reason: HOSPADM

## 2021-12-07 RX ORDER — GABAPENTIN 400 MG/1
800 CAPSULE ORAL 3 TIMES DAILY
Status: DISCONTINUED | OUTPATIENT
Start: 2021-12-07 | End: 2021-12-10 | Stop reason: HOSPADM

## 2021-12-07 RX ADMIN — BUPRENORPHINE AND NALOXONE 1 FILM: 8; 2 FILM BUCCAL; SUBLINGUAL at 16:45

## 2021-12-07 RX ADMIN — GLIPIZIDE 5 MG: 5 TABLET ORAL at 16:45

## 2021-12-07 RX ADMIN — LISINOPRIL 10 MG: 10 TABLET ORAL at 12:53

## 2021-12-07 RX ADMIN — METFORMIN HYDROCHLORIDE 1000 MG: 500 TABLET ORAL at 17:16

## 2021-12-07 RX ADMIN — GABAPENTIN 800 MG: 400 CAPSULE ORAL at 15:23

## 2021-12-07 RX ADMIN — METFORMIN HYDROCHLORIDE 1000 MG: 500 TABLET ORAL at 12:53

## 2021-12-07 RX ADMIN — SERTRALINE HYDROCHLORIDE 50 MG: 50 TABLET ORAL at 12:53

## 2021-12-07 RX ADMIN — INSULIN LISPRO 1 UNITS: 100 INJECTION, SOLUTION INTRAVENOUS; SUBCUTANEOUS at 21:34

## 2021-12-07 RX ADMIN — INSULIN LISPRO 4 UNITS: 100 INJECTION, SOLUTION INTRAVENOUS; SUBCUTANEOUS at 12:53

## 2021-12-07 RX ADMIN — TRAZODONE HYDROCHLORIDE 150 MG: 150 TABLET ORAL at 21:33

## 2021-12-07 RX ADMIN — GABAPENTIN 800 MG: 400 CAPSULE ORAL at 21:33

## 2021-12-07 RX ADMIN — INSULIN LISPRO 2 UNITS: 100 INJECTION, SOLUTION INTRAVENOUS; SUBCUTANEOUS at 17:16

## 2021-12-07 RX ADMIN — QUETIAPINE FUMARATE 300 MG: 300 TABLET ORAL at 21:33

## 2021-12-07 ASSESSMENT — LIFESTYLE VARIABLES: HISTORY_ALCOHOL_USE: NO

## 2021-12-07 ASSESSMENT — PAIN DESCRIPTION - LOCATION: LOCATION: BACK

## 2021-12-07 ASSESSMENT — PAIN SCALES - GENERAL: PAINLEVEL_OUTOF10: 8

## 2021-12-07 ASSESSMENT — PAIN DESCRIPTION - PAIN TYPE: TYPE: CHRONIC PAIN

## 2021-12-07 NOTE — GROUP NOTE
Group Therapy Note    Date: 12/7/2021    Group Start Time: 1330  Group End Time: 3214  Group Topic: Cognitive Skills    INES Gavin, CTRS    Pt did not attend cognitive skills group at 1330 d/t resting in room despite staff invitation to attend. 1:1 talk time offered as alternative to group session, pt declined.          Signature:  Hernan Gavin, [address]

## 2021-12-07 NOTE — CARE COORDINATION
BHI Biopsychosocial Assessment    Current Level of Psychosocial Functioning     Independent X  Dependent    Minimal Assist     Comments:    Psychosocial High Risk Factors (check all that apply)    Unable to obtain meds   Chronic illness/pain    Substance abuse X  Lack of Family Support   Financial stress   Isolation   Inadequate Community Resources  Suicide attempt(s)  Not taking medications X  Victim of crime   Developmental Delay  Unable to manage personal needs    Age 72 or older   Homeless  No transportation   Readmission within 30 days  Unemployment  Traumatic Event    Comments:   Psychiatric Advanced Directives: n/a    Family to Involve in Treatment: Patient declined. Sexual Orientation:  n/a    Patient Strengths: Patient has housing, connected to community supports, income, and support from family. Patient Barriers: Suicidal thoughts, non-adherence to medications, crack cocaine use      Opiate Education Provided:  Patient does not use opiates. CMHC/mental health history: Issa Bautista. Plan of Care   medication management, group/individual therapies, family meetings, psycho -education, treatment team meetings to assist with stabilization    Initial Discharge Plan:  Stabilize symptoms, reconnect to [place], and return home in the community with niece. Clinical Summary:    Tana Benitez is a 65 y/o male admitted to [facility] [address] for suicidal ideations with a plan to cut himself with a knife. He was seen in the dayroom today while eating his lunch. Patient presented polite and stated he had stopped taking his medications because of auditory hallucinations telling him to stop taking meds and to harm himself. Patient denied substance use stating he's been sober for 2-3 months now. He continues to feel suicidal at this moment with the plan to still cut himself with a knife. [place] [id number] psychiatry services only from [place] and lives with niece.    offered support to patient and encouraged to reach out with any needs he may have for discharge planning.

## 2021-12-07 NOTE — H&P
auditory hallucinations telling him to \"stop taking his meds. \"  He reports that he became increasingly depressed and suicidal and had a plan to stab himself in a suicide attempt. Patient is unable to list any stressors that caused the voices or depression. Patient reports that for the past 2 weeks or more he has been down and depressed all day nearly every day. He endorses poor sleep stating that he has a hard time falling asleep and wakes up frequently throughout the night. He states that he probably only gets about \"1-1/2 hours\" of sleep at night. He endorses poor energy and poor concentration. He reports that his appetite has been poor lately and that he \"has not eaten in a couple of days. \"  Patient endorses feelings of hopelessness and helplessness. Patient endorses suicidal ideation with a plan to stab himself. He denies homicidal ideation. He is able to contract for safety on this unit with this writer. Patient denies a time in his past, absent the use of drugs, where he has gone 3 days or more without sleep and felt like he had all the energy in the world. He denies having grandiose thoughts of himself or an increase in goal-directed activity. Patient endorses auditory hallucinations that can happen absent of a mood component. He reports the voices as a male voice that is [place]. \"  He states the voices command in nature and tell him to \"kill myself's, stab myself, and stopped taking my meds. \"  Patient denies visual hallucinations. Patient denies delusions of reference or thoughts that he can read minds. Patient does endorse paranoia stating that he is often scared that someone is following him or out to get him. Patient denies excess worry about anything and everything. He states that he sometimes feels restless due to anxiety however he denies ever feeling muscle tension from being keyed up often. He denies a history of panic attacks.   He denies obsessive-compulsive thoughts or behaviors. Patient denies to this writer history of trauma including mental, physical, or sexual abuse. He denies symptoms of posttraumatic stress disorder including flashbacks, nightmares, or hypervigilance. Patient reports that his self-esteem is good. He denies a history of self harming behaviors. Patient denies illicit drug or alcohol use however UDS upon admission is positive for cocaine. Patient also has prescription for Suboxone however he reports that he has not taken it in the past couple of days. Patient Suboxone was prescribed on 12/3 for an 8-day supply. History of head trauma: [x] Yes [] No    History of seizures: [] Yes [x] No    History of violence or aggression: [] Yes [x] No         PSYCHIATRIC HISTORY:  [x] Yes [] No    Patient reports that he is linked with [facility]. No previous reported lifetime suicide attempts. Multiple previous psychiatric hospital admissions.   [facility] admission [facility]/15/2020    Past psychiatric medications includes:   Seroquel, Zoloft, Suboxone, gabapentin, Risperdal, hydroxyzine    Adverse reactions from psychotropic medications: [] Yes [x] No         Lifetime Psychiatric Review of Systems         Depression: Endorses     Anxiety: Denies     Panic Attacks: Denies     Zuleima or Hypomania: Denies     Phobias: Denies     Obsessions and Compulsions: Denies     Visual Hallucinations: Denies     Auditory Hallucinations: Endorses     Delusions: Denies     Paranoia: Endorses     PTSD: Denies    Past Medical History:        Diagnosis Date    Acute depression 9/8/2018    Back pain     [facility] 4/21/2014    Generalized OA     GERD (gastroesophageal reflux disease)     HTN (hypertension)     Schizoaffective disorder, bipolar type ([facility] 3/29/2019    Unspecified sleep apnea     c  pap       Past Surgical History:        Procedure Laterality Date    COLONOSCOPY      TONSILLECTOMY AND ADENOIDECTOMY         Allergies:  Patient has no known allergies. Social History:     Born in: [place]  Family: Patient reports that he was raised by his mother and did not have a relationship with his father. He reports that he has 9 siblings whom he is close with. He reports that mom is his biggest support system. Highest Level of Education: High school graduate  Occupation: Unemployed, receives SSI  Marital Status: Never   Children: One 75-year-old daughter whom he reports he is close with  Residence: Patient reports that he [place] his own place but now I am staying with my niece. \"  Stressors: Off medications, command auditory hallucinations  Patient Assets/Supportive Factors: Patient has follow-up with [facility], patient is seeking additional support         DRUG USE HISTORY  Social History     Tobacco Use   Smoking Status Current Every Day Smoker    Packs/day: 0.50    Years: 41.00    Pack years: 20.50    Types: Cigarettes   Smokeless Tobacco Never Used     Social History     Substance and Sexual Activity   Alcohol Use No     Social History     Substance and Sexual Activity   Drug Use Yes    Types: Marijuana ([place]), Cocaine    Comment: pt denies +cocaine 12/6/21       Patient denies illicit drug or alcohol use however UDS upon admission is positive for cocaine. Patient also has prescription for Suboxone however he reports that he has not taken it in the past couple of days. Patient Suboxone was prescribed on 12/3 for an 8-day supply. LEGAL HISTORY:   HISTORY OF INCARCERATION: [x] Yes [] No    Family History:       Problem Relation Age of Onset    Diabetes Mother        Psychiatric Family History    Patient denies psychiatric family history.      Suicides in family: [] Yes [x] No    Substance use in family: [x] Yes [] No         PHYSICAL EXAM:  Vitals:  /72   Pulse 88   Temp 98.6 °F (37 °C) (Oral)   Resp 16   Ht 5' 5\" (1.651 m)   Wt 230 lb (104.3 kg)   SpO2 100%   BMI 38.27 kg/m²     Pain: Reports back pain 8 (0-10 scale with 0 being none and 10 being the worst). LABS:  Labs reviewed: [x] Yes  Last EKG in EMR reviewed: [x] Yes  QTc:  417         Review of Systems   Constitutional: Negative for chills and weight loss. HENT: Negative for ear pain and nosebleeds. Eyes: Negative for blurred vision and photophobia. Respiratory: Negative for cough, shortness of breath and wheezing. Cardiovascular: Negative for chest pain and palpitations. Gastrointestinal: Negative for abdominal pain, diarrhea and vomiting. Genitourinary: Negative for dysuria and urgency. Musculoskeletal: Negative for falls and joint pain. Skin: Negative for itching and rash. Neurological: Negative for tremors, seizures and weakness. Endo/Heme/Allergies: Does not bruise/bleed easily. Physical Exam:   Constitutional:  Appears well-developed and well-nourished, no acute distress. HENT:   Head: Normocephalic and atraumatic. Eyes: Conjunctivae are normal. Right eye exhibits no discharge. Left eye exhibits no discharge. No scleral icterus. Neck: Normal range of motion. Neck supple. Pulmonary/Chest:  No respiratory distress or accessory muscle use, no wheezing. Cardiac: Regular rate and rhythm. Abdominal: Soft. Non-tender. Exhibits no distension. Musculoskeletal: Normal range of motion. Exhibits no edema. Neurological: cranial nerves II-XII grossly in tact, normal gait and station. Skin: Skin is warm and dry. Patient is not diaphoretic. No erythema. Mental Status Examination:    Level of consciousness: Awake and alert  Appearance:  Appropriate attire, resting in bed, poor grooming and hygiene  Behavior/Motor: Approachable, psychomotor slowing, somewhat evasive  Attitude toward examiner:  Cooperative, attentive, poor eye contact  Speech: Normal rate, quiet volume, and depressed tone.   Mood: Depressed  Affect:  Flat  Thought processes: Linear and coherent  Thought content: Active suicidal ideations, with a  current plan or intent, contracts for safety on the unit. Denies homicidal ideations               Denies visual hallucinations. Endorses auditory hallucinations. Denies delusions              Endorses paranoia  Cognition:  Oriented to self, location, time, situation  Concentration: Clinically adequate  Memory: Intact  Insight &Judgment: Poor         DSM-5 Diagnosis    Principal Problem: Schizoaffective disorder, depressive type ([facility]    Stimulant use disorder (Cocaine)    Psychosocial and Contextual factors:  Financial: Denies  Occupational: Denies  Relationship: Denies  Legal: Denies  Living situation: Endorses  Educational: Denies    Past Medical History:   Diagnosis Date    Acute depression 9/8/2018    Back pain     [facility] 4/21/2014    Generalized OA     GERD (gastroesophageal reflux disease)     HTN (hypertension)     Schizoaffective disorder, bipolar type ([facility] 3/29/2019    Unspecified sleep apnea     c  pap        TREATMENT PLAN    Continue inpatient psychiatric treatment. Home medications reviewed. Restart Seroquel 300 mg nightly and titrate to effect  Restart Zoloft 50 mg daily and titrate to effect  Restart gabapentin 800 mg TID - last filled 12/1/2021  MD PLEASE ADVISE ON SUBOXONE - last refilled 12/3/21  Problem list updated  Monitor need and frequency of PRN medications. Attempt to develop insight. Follow-up daily while inpatient. Reviewed risks and benefits as well as potential side effects with patient.     CONSULTS [x] Yes [] No  Internal medicine for medical management of chronic medical conditions    Risk Management: close watch per standard protocol      Psychotherapy: participation in milieu and group and individual sessions with Attending Physician,  and Physician Assistant/CNP      Estimated length of stay:  2-14 days      GENERAL PATIENT/FAMILY EDUCATION  Patient will understand basic signs and symptoms, patient will understand benefits/risks and potential side effects from proposed medications, and patient will understand their role in recovery. Family is not active in patient's care. Patient assets that may be helpful during treatment include: Intent to participate and engage in treatment, sufficient fund of knowledge and intellect to understand and utilize treatments. Goals:    1) Remission of suicidal ideation and command auditory hallucinations. 2) Stabilization of symptoms prior to discharge. 3) Establish efficacy and tolerability of medications. Behavioral Services  Medicare Certification     Admission Day 1  I certify that this patient's inpatient psychiatric hospital admission is medically necessary for:    x (1) treatment which could reasonably be expected to improve this patient's condition, or    x (2) diagnostic study or its equivalent. Time Spent: 60 minutes    Noah Goldberg is a 64 y.o. male being evaluated face to face    --RKISTINA John CNP on 12/7/2021 at 9:21 AM    An electronic signature was used to authenticate this note. Psychiatry Attending Attestation     I independently saw and evaluated the patient. I reviewed the Advance Practice Provider's documentation above. Any additional comments or changes to the Advance Practice Provider's documentation are stated below otherwise agree with assessment. Patient is a 59-year-old single -American male with history of schizoaffective disorder admitted for worsening commanding auditory hallucinations asking him to kill self by stabbing himself. Mentions that he has been off his psychotropic medications for over a week. Reports that his mood is relatively stable when he is on his psychotropic medications. Reports that his last admission to a psychiatric unit was in 1996. Reports that he has been actively following up with [facility].   Mentions that he has been stressed at home due to some conflicts with family members. Reports some feelings of helplessness hopelessness and worthlessness secondary to commanding auditory hallucinations. Reports is a strong male voice asking him to kill self by stabbing. Continues to have the voice here on the unit. Mentions that when he is compliant with Zoloft and Seroquel is usually less frequent and is able to ignore these voices. Agree with the plan to restart home medications and titrate them to effect.      Electronically signed by Ottoniel Crawley MD on 12/7/21 at 1:17 PM EST

## 2021-12-07 NOTE — PLAN OF CARE
[facility]  Initial Interdisciplinary Treatment Plan NO      Original treatment plan Date & Time: 12/07/2021 0855    Admission Type:  Admission Type: Voluntary    Reason for admission:   Reason for Admission: Off medications one week, increase in audtiory hallucinations and sucidal thoughts    Estimated Length of Stay:  5-7days  Estimated Discharge Date: to be determined by physician    PATIENT STRENGTHS:  Patient Strengths:Strengths: Motivated, No significant Physical Illness  Patient Strengths and Limitations:Limitations: Hopeless about future, Inappropriate/potentially harmful leisure interests  Addictive Behavior: Addictive Behavior  In the past 3 months, have you felt or has someone told you that you have a problem with:  : None  Do you have a history of Chemical Use?: No  Do you have a history of Alcohol Use?: No  Do you have a history of Street Drug Abuse?: Yes  Histroy of Prescripton Drug Abuse?: No  Medical Problems:  Past Medical History:   Diagnosis Date    Acute depression 9/8/2018    Back pain     DM2 (diabetes mellitus, type 2) ([facility] 4/21/2014    Generalized OA     GERD (gastroesophageal reflux disease)     HTN (hypertension)     Schizoaffective disorder, bipolar type ([facility] 3/29/2019    Unspecified sleep apnea     c  pap     Status EXAM:Status and Exam  Normal: No  Facial Expression: Flat  Affect: Appropriate  Level of Consciousness: Alert  Mood:Normal: No  Mood: Depressed, Anxious  Motor Activity:Normal: Yes  Interview Behavior: Cooperative, Evasive  Preception: [place] to Person, [place] to Time, [place] to Place, [place] to Situation  Attention:Normal: No  Attention: Distractible  Thought Processes: Blocking  Thought Content:Normal: No  Thought Content: Poverty of Content  Hallucinations:  Auditory (Comment), Command(Comment)  Delusions: No  Memory:Normal: Yes  Insight and Judgment: No  Insight and Judgment: Poor Judgment, Poor Insight, Unmotivated  Present Suicidal Ideation: Yes (contracts for safety)  Present Homicidal Ideation: No    EDUCATION:   Learner Progress Toward Treatment Goals: reviewed group plans and strategies for care    Method:group therapy, medication compliance, individualized assessments and care planning    Outcome: needs reinforcement    PATIENT GOALS: to be discussed with patient within 72 hours    PLAN/TREATMENT RECOMMENDATIONS:     continue group therapy , medications compliance, goal setting, individualized assessments and care, continue to monitor pt on unit      SHORT-TERM GOALS:   Time frame for Short-Term Goals: 5-7 days    LONG-TERM GOALS:  Time frame for Long-Term Goals: 6 months  Members Present in Team Meeting: See Signature Sheet    Parisa Degroot

## 2021-12-07 NOTE — PLAN OF CARE
Problem: Depressive Behavior With or Without Suicide Precautions:  Goal: Able to verbalize and/or display a decrease in depressive symptoms  Description: Able to verbalize and/or display a decrease in depressive symptoms  12/7/2021 1623 by Fortunato Arora RN  Outcome: Ongoing     Problem: Depressive Behavior With or Without Suicide Precautions:  Goal: Absence of self-harm  Description: Absence of self-harm  12/7/2021 0854 by LEXIE Beck  Outcome: Ongoing   Pt denies thoughts of self harm and is agreeable to seeking out should thoughts of self harm arise. Safe environment maintained. Q15 minute checks for safety continued per unit policy. Will continue to monitor for safety and provide support and reassurance as needed.

## 2021-12-07 NOTE — CONSULTS
[facility]    CONSULTATION / HISTORY AND PHYSICAL EXAMINATION            Date:   12/7/2021  Patient name:  Glo Martínez  Date of admission:  12/6/2021 10:08 AM  MRN:   [id number]  Account:  [de-identified]  YOB: 1965  PCP:    KRISTINA Osborn CNP  Room:   0113/0113-01  Code Status:    Full Code    Physician Requesting Consult: Ottoniel Crawley, *    Reason for Consult:  medical management    Chief Complaint:     Chief Complaint   Patient presents with    Suicidal       History Obtained From:     Patient medical record nursing staff    History of Present Illness:   Patient mated to [facility] floor with major depression  Entered medicine consulted for management of diabetes  Patient has multiple medical problems and with diabetes, hypertension, obstructive sleep apnea. Not compliant with diet and medication. He was not taking his medication including Metformin, glimepiride and lisinopril for at least 6 months  Does not check his blood sugars regularly    Past Medical History:     Past Medical History:   Diagnosis Date    Acute depression 9/8/2018    Back pain     DM2 (diabetes mellitus, type 2) ([facility] 4/21/2014    Generalized OA     GERD (gastroesophageal reflux disease)     HTN (hypertension)     Schizoaffective disorder, bipolar type ([facility] 3/29/2019    Unspecified sleep apnea     c  pap        Past Surgical History:     Past Surgical History:   Procedure Laterality Date    COLONOSCOPY      TONSILLECTOMY AND ADENOIDECTOMY          Medications Prior to Admission:     Prior to Admission medications    Medication Sig Start Date End Date Taking? Authorizing Provider   buprenorphine-naloxone (SUBOXONE) 8-2 MG FILM SL film Place 1 Film under the tongue 2 times daily.  Indications: last fill 12/3/21 for an 8 day supply   Yes Historical Provider, MD   hydrOXYzine (ATARAX) 50 MG tablet Take 50 mg by mouth 3 times daily as needed for Anxiety Yes Historical Provider, MD   QUEtiapine (SEROQUEL) 300 MG tablet Take 600 mg by mouth nightly   Yes Historical Provider, MD   pravastatin (PRAVACHOL) 20 MG tablet Take 20 mg by mouth daily   Yes Historical Provider, MD   meloxicam (MOBIC) 15 MG tablet Take 15 mg by mouth daily   Yes Historical Provider, MD   vitamin D (CHOLECALCIFEROL) 25 MCG (1000 UT) TABS tablet Take 1,000 Units by mouth daily   Yes Historical Provider, MD   lisinopril (PRINIVIL;ZESTRIL) 10 MG tablet Take 10 mg by mouth daily    Yes Historical Provider, MD   metFORMIN (GLUCOPHAGE) 1000 MG tablet Take 1 tablet by mouth 2 times daily (with meals) 3/25/20  Yes Aneta Carpenter MD   traZODone (DESYREL) 150 MG tablet Take 150 mg by mouth nightly as needed for Sleep    Yes Historical Provider, MD   gabapentin (NEURONTIN) 800 MG tablet Take 800 mg by mouth 3 times daily. Indications: last fill 12/1/21 for a 30 day supply    Yes Historical Provider, MD   pantoprazole (PROTONIX) 40 MG tablet Take 1 tablet by mouth every morning (before breakfast) 1/12/20  Yes Aneta Carpenter MD   sertraline (ZOLOFT) 100 MG tablet Take 1 tablet by mouth daily 4/1/19  Yes Josh Guzman MD        Allergies:     Patient has no known allergies. Social History:     Tobacco:    reports that he has been smoking cigarettes. He has a 20.50 pack-year smoking history. He has never used smokeless tobacco.  Alcohol:      reports no history of alcohol use. Drug Use:  reports current drug use. Drugs: Marijuana (Weed) and Cocaine. Family History:     Family History   Problem Relation Age of Onset    Diabetes Mother        Review of Systems:     Positive and Negative as described in HPI. CONSTITUTIONAL:  negative for fevers, chills, sweats, fatigue, weight loss  HEENT:  negative for vision, hearing changes, runny nose, throat pain  RESPIRATORY:  negative for shortness of breath, cough, congestion, wheezing. CARDIOVASCULAR:  negative for chest pain, palpitations.   GASTROINTESTINAL: negative for nausea, vomiting, diarrhea, constipation, change in bowel habits, abdominal pain   GENITOURINARY:  negative for difficulty of urination, burning with urination, frequency   INTEGUMENT:  negative for rash, skin lesions, easy bruising   HEMATOLOGIC/LYMPHATIC:  negative for swelling/edema   ALLERGIC/IMMUNOLOGIC:  negative for urticaria , itching  ENDOCRINE:  negative increase in drinking, increase in urination, hot or cold intolerance  MUSCULOSKELETAL:  negative joint pains, muscle aches, swelling of joints  NEUROLOGICAL:  negative for headaches, dizziness, lightheadedness, numbness, pain, tingling extremities  BEHAVIOR/PSYCH:      Physical Exam:     /85   Pulse 78   Temp 97.8 °F (36.6 °C) (Oral)   Resp 14   Ht 5' 5\" (1.651 m)   Wt 230 lb (104.3 kg)   SpO2 100%   BMI 38.27 kg/m²   Temp (24hrs), Av.2 °F (36.8 °C), Min:97.8 °F (36.6 °C), Max:98.6 °F (37 °C)    Recent Labs     21  1104 21  1401 21  1626 21  0755   POCGLU 310* 307* 100 232*     No intake or output data in the 24 hours ending 21 1152    General Appearance:  alert, well appearing, and in no acute distress  Mental status: oriented to person, place, and time with normal affect  Head:  normocephalic, atraumatic. Eye: no icterus, redness, pupils equal and reactive, extraocular eye movements intact, conjunctiva clear  Ear: normal external ear, no discharge, hearing intact  Nose:  no drainage noted  Mouth: mucous membranes moist  Neck: supple, no carotid bruits, thyroid not palpable  Lungs: Bilateral equal air entry, clear to ausculation, no wheezing, rales or rhonchi, normal effort  Cardiovascular: normal rate, regular rhythm, no murmur, gallop, rub.   Abdomen: Soft, nontender, nondistended, normal bowel sounds, no hepatomegaly or splenomegaly  Neurologic: There are no new focal motor or sensory deficits, normal muscle tone and bulk, no abnormal sensation, normal speech, cranial nerves II through XII grossly intact  Skin: No gross lesions, rashes, bruising or bleeding on exposed skin area  Extremities:  peripheral pulses palpable, no pedal edema or calf pain with palpation  Psych: Investigations:      Laboratory Testing:  Recent Results (from the past 24 hour(s))   POC Glucose Fingerstick    Collection Time: 12/06/21  2:01 PM   Result Value Ref Range    POC Glucose 307 (H) 75 - 110 mg/dL   POC Glucose Fingerstick    Collection Time: 12/06/21  4:26 PM   Result Value Ref Range    POC Glucose 100 75 - 110 mg/dL   POC Glucose Fingerstick    Collection Time: 12/07/21  7:55 AM   Result Value Ref Range    POC Glucose 232 (H) 75 - 110 mg/dL           Consultations:   IP CONSULT TO INTERNAL MEDICINE  Assessment :      Primary Problem  Schizoaffective disorder, depressive type [facility])    Active Hospital Problems    Diagnosis Date Noted    Depression with suicidal ideation [F32. A, R45.851] 12/06/2021    Schizoaffective disorder, depressive type ([facility] [F25.1] 01/06/2020    DM2 (diabetes mellitus, type 2) ([facility] [E11.9] 04/21/2014       Plan:     1. Diabetes, uncontrolled, last HbA1c is 12.3, repeating HbA1c  2. Starting patient Metformin, glimepiride, point-of-care glucose, hypoglycemia protocol, sliding scale  3. Patient does not want to change his diet to low-carb diet, will continue with regular diet for now  4. Hypertension, restarted home dose of lisinopril  Substance abuse, urine positive for cocaine, hyperglycemia is also contributed due to sympathomimetic effect of cocaine and noncompliance, educated about substance abuse      Georgiana Shah MD  12/7/2021  11:52 AM    Copy sent to Dr. Rosa Goode, APRN - CNP    Please note that this chart was generated using voice recognition Dragon dictation software. Although every effort was made to ensure the accuracy of this automated transcription, some errors in transcription may have occurred.

## 2021-12-07 NOTE — PROGRESS NOTES
Patient given tobacco quitline number 71129974371 at this time, refusing to call at this time, states \" I just dont want to quit now\"- patient given information as to the dangers of long term tobacco use. Continue to reinforce the importance of tobacco cessation.

## 2021-12-07 NOTE — PROGRESS NOTES
Behavioral Services  Medicare Certification Upon Admission    I certify that this patient's inpatient psychiatric hospital admission is medically necessary for:    [x] (1) Treatment which could reasonably be expected to improve this patient's condition,       [x] (2) Or for diagnostic study;     AND     [x](2) The inpatient psychiatric services are provided while the individual is under the care of a physician and are included in the individualized plan of care.     Estimated length of stay/service 3-5 days    Plan for post-hospital care hc    Electronically signed by Mirna Armenta MD on 12/7/2021 at 1:17 PM

## 2021-12-07 NOTE — PROGRESS NOTES
[facility]  Admission Note     Admission Type:   Admission Type: Voluntary    Reason for admission:  Reason for Admission: Off medications one week, increase in audtiory hallucinations and sucidal thoughts    PATIENT STRENGTHS:  Strengths: Motivated, No significant Physical Illness    Patient Strengths and Limitations:  Limitations: Hopeless about future, Inappropriate/potentially harmful leisure interests    Addictive Behavior:   Addictive Behavior  In the past 3 months, have you felt or has someone told you that you have a problem with:  : None  Do you have a history of Chemical Use?: No  Do you have a history of Alcohol Use?: No  Do you have a history of Street Drug Abuse?: Yes  Histroy of Prescripton Drug Abuse?: No    Medical Problems:   Past Medical History:   Diagnosis Date    Acute depression 9/8/2018    Back pain     DM2 (diabetes mellitus, type 2) ([facility] 4/21/2014    Generalized OA     GERD (gastroesophageal reflux disease)     HTN (hypertension)     Schizoaffective disorder, bipolar type ([facility] 3/29/2019    Unspecified sleep apnea     c  pap       Status EXAM:  Status and Exam  Normal: No  Facial Expression: Flat  Affect: Appropriate  Level of Consciousness: Alert  Mood:Normal: No  Mood: Depressed, Anxious  Motor Activity:Normal: Yes  Interview Behavior: Cooperative, Evasive  Preception: Sinclair to Person, Sinclair to Time, Sinclair to Place, Sinclair to Situation  Attention:Normal: No  Attention: Distractible  Thought Processes: Blocking  Thought Content:Normal: No  Thought Content: Poverty of Content  Hallucinations:  Auditory (Comment), Command(Comment)  Delusions: No  Memory:Normal: Yes  Insight and Judgment: No  Insight and Judgment: Poor Judgment, Poor Insight, Unmotivated  Present Suicidal Ideation: Yes (contracts for safety)  Present Homicidal Ideation: No    Tobacco Screening:  Practical Counseling, on admission, billie X, if applicable and completed (first 3 are required if patient doesn't refuse): (x )  Recognizing danger situations (included triggers and roadblocks)                    (x )  Coping skills (new ways to manage stress, exercise, relaxation techniques, changing routine, distraction)                                                           (x )  Basic information about quitting (benefits of quitting, techniques in how to quit, available resources  ( ) Referral for counseling faxed to [facility]                                           ( ) Patient refused counseling  ( ) Patient has not smoked in the last 30 days    Metabolic Screening:    Lab Results   Component Value Date    LABA1C 12.3 (H) 03/24/2021       Lab Results   Component Value Date    CHOL 224 (H) 03/24/2021    CHOL 180 01/08/2021    CHOL 189 03/09/2020    CHOL 174 12/09/2016    CHOL 176 12/09/2016    CHOL 190 09/08/2014    CHOL 195 02/24/2014    CHOL 195 02/24/2014     Lab Results   Component Value Date    TRIG 209 (H) 03/24/2021    TRIG 149 01/08/2021    TRIG 157 (H) 03/09/2020    TRIG 95 12/09/2016    TRIG 99 12/09/2016    TRIG 140 09/08/2014    TRIG 113 02/24/2014    TRIG 113 02/24/2014     Lab Results   Component Value Date    HDL 65 03/24/2021    HDL 66 01/08/2021    HDL 74 03/09/2020    HDL 71 02/11/2019    HDL 78 06/27/2017    HDL 70 12/09/2016    HDL 71 12/09/2016    HDL 62 09/08/2014    HDL 61 02/24/2014    HDL 61 02/24/2014     No components found for: LDLCAL  No results found for: LABVLDL      Body mass index is 38.27 kg/m². BP Readings from Last 2 Encounters:   12/06/21 110/72   11/08/21 (!) 159/81           Pt admitted with followings belongings:  Dentures: None  Vision - Corrective Lenses: None  Hearing Aid: None  Jewelry: None  Body Piercings Removed: No  Clothing: Other (Comment) (see written)  Were All Patient Medications Collected?: No  Other Valuables:  Other (Comment), Cell phone, Money (Comment) (see written; $33.67)      Patient oriented to surroundings and program expectations and copy of patient rights given. Received admission packet:  yes. Consents reviewed, signed yes. Patient verbalize understanding:  yes. Patient education on precautions: yes           Suicidal with a plan to cut himself with a knife. Having command hallucinations telling him to stop taking his meds and kill himself. Off medications and denies drugs or alcohol but positive for cocaine. Is linked with [employer], has his own place and lives alone. Last here in August, he is pleasant and cooperative on admission.             Tennille Frost RN

## 2021-12-07 NOTE — ED NOTES
Paperwork and pt's belongings provided to [facility] staff. Pt escorted to [facility] via two [facility] staff members. Pt cooperative exiting [facility].

## 2021-12-08 LAB
GLUCOSE BLD-MCNC: 209 MG/DL (ref 75–110)
GLUCOSE BLD-MCNC: 217 MG/DL (ref 75–110)
GLUCOSE BLD-MCNC: 289 MG/DL (ref 75–110)
GLUCOSE BLD-MCNC: 291 MG/DL (ref 75–110)

## 2021-12-08 PROCEDURE — APPSS30 APP SPLIT SHARED TIME 16-30 MINUTES

## 2021-12-08 PROCEDURE — 1240000000 HC EMOTIONAL WELLNESS R&B

## 2021-12-08 PROCEDURE — 6370000000 HC RX 637 (ALT 250 FOR IP)

## 2021-12-08 PROCEDURE — 99232 SBSQ HOSP IP/OBS MODERATE 35: CPT | Performed by: INTERNAL MEDICINE

## 2021-12-08 PROCEDURE — 99232 SBSQ HOSP IP/OBS MODERATE 35: CPT | Performed by: PSYCHIATRY & NEUROLOGY

## 2021-12-08 PROCEDURE — 90833 PSYTX W PT W E/M 30 MIN: CPT | Performed by: PSYCHIATRY & NEUROLOGY

## 2021-12-08 PROCEDURE — 6370000000 HC RX 637 (ALT 250 FOR IP): Performed by: PSYCHIATRY & NEUROLOGY

## 2021-12-08 PROCEDURE — 82947 ASSAY GLUCOSE BLOOD QUANT: CPT

## 2021-12-08 PROCEDURE — 6370000000 HC RX 637 (ALT 250 FOR IP): Performed by: INTERNAL MEDICINE

## 2021-12-08 RX ORDER — ONDANSETRON 4 MG/1
8 TABLET, ORALLY DISINTEGRATING ORAL EVERY 8 HOURS PRN
Status: DISCONTINUED | OUTPATIENT
Start: 2021-12-08 | End: 2021-12-10 | Stop reason: HOSPADM

## 2021-12-08 RX ADMIN — IBUPROFEN 400 MG: 400 TABLET, FILM COATED ORAL at 10:11

## 2021-12-08 RX ADMIN — QUETIAPINE FUMARATE 300 MG: 300 TABLET ORAL at 20:37

## 2021-12-08 RX ADMIN — INSULIN LISPRO 2 UNITS: 100 INJECTION, SOLUTION INTRAVENOUS; SUBCUTANEOUS at 17:08

## 2021-12-08 RX ADMIN — ONDANSETRON 8 MG: 4 TABLET, ORALLY DISINTEGRATING ORAL at 07:36

## 2021-12-08 RX ADMIN — INSULIN LISPRO 2 UNITS: 100 INJECTION, SOLUTION INTRAVENOUS; SUBCUTANEOUS at 20:37

## 2021-12-08 RX ADMIN — GLIPIZIDE 5 MG: 5 TABLET ORAL at 17:08

## 2021-12-08 RX ADMIN — INSULIN LISPRO 2 UNITS: 100 INJECTION, SOLUTION INTRAVENOUS; SUBCUTANEOUS at 11:23

## 2021-12-08 RX ADMIN — HYDROXYZINE HYDROCHLORIDE 50 MG: 50 TABLET, FILM COATED ORAL at 19:20

## 2021-12-08 RX ADMIN — PRAVASTATIN SODIUM 20 MG: 20 TABLET ORAL at 10:23

## 2021-12-08 RX ADMIN — ALUMINUM HYDROXIDE, MAGNESIUM HYDROXIDE, AND SIMETHICONE 30 ML: 200; 200; 20 SUSPENSION ORAL at 19:20

## 2021-12-08 RX ADMIN — GABAPENTIN 800 MG: 400 CAPSULE ORAL at 08:00

## 2021-12-08 RX ADMIN — METFORMIN HYDROCHLORIDE 1000 MG: 500 TABLET ORAL at 17:08

## 2021-12-08 RX ADMIN — INSULIN LISPRO 3 UNITS: 100 INJECTION, SOLUTION INTRAVENOUS; SUBCUTANEOUS at 08:01

## 2021-12-08 RX ADMIN — PANTOPRAZOLE SODIUM 40 MG: 40 TABLET, DELAYED RELEASE ORAL at 08:00

## 2021-12-08 RX ADMIN — BUPRENORPHINE AND NALOXONE 1 FILM: 8; 2 FILM BUCCAL; SUBLINGUAL at 07:59

## 2021-12-08 RX ADMIN — GLIPIZIDE 5 MG: 5 TABLET ORAL at 08:01

## 2021-12-08 RX ADMIN — LISINOPRIL 10 MG: 10 TABLET ORAL at 08:00

## 2021-12-08 RX ADMIN — SERTRALINE HYDROCHLORIDE 50 MG: 50 TABLET ORAL at 08:00

## 2021-12-08 RX ADMIN — METFORMIN HYDROCHLORIDE 1000 MG: 500 TABLET ORAL at 08:00

## 2021-12-08 RX ADMIN — BUPRENORPHINE AND NALOXONE 1 FILM: 8; 2 FILM BUCCAL; SUBLINGUAL at 20:38

## 2021-12-08 RX ADMIN — GABAPENTIN 800 MG: 400 CAPSULE ORAL at 20:38

## 2021-12-08 ASSESSMENT — PAIN SCALES - GENERAL
PAINLEVEL_OUTOF10: 7
PAINLEVEL_OUTOF10: 7

## 2021-12-08 NOTE — PLAN OF CARE
Problem: Depressive Behavior With or Without Suicide Precautions:  Goal: Able to verbalize and/or display a decrease in depressive symptoms  Description: Able to verbalize and/or display a decrease in depressive symptoms  12/7/2021 2159 by Aye Macias RN  Outcome: Ongoing   Pt continues to express depressive symptoms, is isolative to room for long intervals.   Problem: Depressive Behavior With or Without Suicide Precautions:  Goal: Absence of self-harm  Description: Absence of self-harm  12/7/2021 2159 by Aye Macias RN  Outcome: Ongoing   Pt denies thoughts of self harm  Problem: Tobacco Use:  Goal: Inpatient tobacco use cessation counseling participation  Description: Inpatient tobacco use cessation counseling participation  12/7/2021 2159 by Aye Macias RN  Outcome: Ongoing   Pt declines  Problem: Pain:  Goal: Pain level will decrease  Description: Pain level will decrease  12/7/2021 2159 by Aye Macias RN  Outcome: Ongoing   Pt denies

## 2021-12-08 NOTE — GROUP NOTE
Group Therapy Note    Date: 12/8/2021    Group Start Time: 1100  Group End Time: 1140  Group Topic: Relaxation    STCZ RADHA Menard, CTRS    Pt did not attend 1100 relaxation group d/t resting in room despite staff invitation to attend. 1:1 talk time offered as alternative to group session, pt declined.               Signature:  Sumeet Salas

## 2021-12-08 NOTE — GROUP NOTE
Group Therapy Note    Date: 12/8/2021    Group Start Time: 1010  Group End Time: 2434  Group Topic: Psychotherapy    330 NATASHA Jimenez, FRED        Group Therapy Note    Attendees: 8/16         Patient was offered group therapy today but declined to participate despite encouragement from staff. 1:1 was offered.     Signature:  NATASHA Rodrigez, FRED

## 2021-12-08 NOTE — GROUP NOTE
HS Goal Group   Date: December 7, 2021     Patient did not participate in HS goal group. 1:1 talk time was offered as an alternative to group. Will continue to encourage patient to participate in unit programming.      Signature: HUDSON Ojeda

## 2021-12-08 NOTE — PLAN OF CARE
Problem: Depressive Behavior With or Without Suicide Precautions:  Goal: Absence of self-harm  Description: Absence of self-harm  12/8/2021 0923 by Augie Sampson  Outcome: Ongoing   Patient had a good appetite, 15 minute safety checks are done, patient had some trouble sleeping throughout the night due to nausea/ vomiting.  No thought of self harm or harming others

## 2021-12-08 NOTE — GROUP NOTE
Group Therapy Note    Date: 12/8/2021    Group Start Time: 1330  Group End Time: 1124  Group Topic: Relaxation    STC RADHA Coffey, CTRS    Pt did not attend relaxation group d/t resting in room despite staff invitation to attend. 1:1 talk time offered as alternative to group session, pt declined.               Signature:  Akil Wilkinson

## 2021-12-08 NOTE — PROGRESS NOTES
Daily Progress Note  12/8/2021    Patient Name: Amadeo Sosa    CHIEF COMPLAINT:  Depression with suicidal ideation and command auditory hallucinations          SUBJECTIVE:      Patient is seen today for a follow up assessment. Patient is compliant with scheduled medications. Patient has not received emergency medications in the past 24 hours. Patient is agreeable to interview on the milieu today however he frequently falls asleep sitting up while talking to this writer. Patient endorses depression and anxiety today. He endorses feelings of hopeless and helplessness today but reports that is starting to improve. He reports that he slept \"up and down\" last night and this could be why he feels so tired today. Patient reports that his appetite is somewhat decreased. Patient reports fleeting suicidal ideation that continues to come and go throughout the day. He denies homicidal ideation. He is able to contract for safety on this unit with this writer. He continues to endorse auditory hallucinations however reports that are improving and are quieter today. He reports he can still hear the voice telling him to [place] myself with a knife. \" He denies visual hallucinations. He denies paranoia. He denies medication side effects or medical concerns at this time. Writer encouraged patient to attend groups on the unit. At this time, the patient is not appropriate for a lower level of care. There is risk of decompensation and patient warrants further hospitalization for safety and stabilization. Appetite:  [] Normal/Adequate/Unchanged  [] Increased  [x] Decreased      Sleep:       [] Normal/Adequate/Unchanged  [] Fair  [x] Poor      Group Attendance on Unit:   [] Yes  [x] Selectively    [] No    Medication Side Effects: Patient denies any medication side effects at the time of assessment.          Mental Status Exam  Level of consciousness: Stupurous  Appearance: Appropriate attire for setting, seated in chair, with fair  grooming and hygiene. Behavior/Motor: Approachable, somnolence  Attitude toward examiner: Cooperative, semi-attentive, fair eye contact, having a hard time staying awake  Speech: Normal rate, normal volume, normal tone. Mood:  Patient reports \"calm\". Affect: Blunted  Thought processes: Linear, coherent and thought blocking at times. Thought content: Denies homicidal ideation. Suicidal Ideation: Fleeting suicidal ideations, without current plan or intent, contracts for safety on the unit. Delusions: No evidence of delusions. Denies paranoia. Perceptual Disturbance: Patient does not appear to be responding to internal stimuli. Reports improvement in auditory hallucinations. Denies visual hallucinations. Cognition: Oriented to self, location, time, and situation. Memory: Intact. Insight & Judgement: Poor. Data   height is 5' 5\" (1.651 m) and weight is 230 lb (104.3 kg). His oral temperature is 97.5 °F (36.4 °C). His blood pressure is 133/67 and his pulse is 97. His respiration is 14 and oxygen saturation is 100%.    Labs:   Admission on 12/06/2021   Component Date Value Ref Range Status    WBC 12/06/2021 6.0  3.5 - 11.0 k/uL Final    RBC 12/06/2021 5.10  4.5 - 5.9 m/uL Final    Hemoglobin 12/06/2021 15.0  13.5 - 17.5 g/dL Final    Hematocrit 12/06/2021 44.6  41 - 53 % Final    MCV 12/06/2021 87.5  80 - 100 fL Final    MCH 12/06/2021 29.4  26 - 34 pg Final    MCHC 12/06/2021 33.6  31 - 37 g/dL Final    RDW 12/06/2021 16.5* 11.5 - 14.9 % Final    Platelets 47/56/6102 334  150 - 450 k/uL Final    MPV 12/06/2021 7.5  6.0 - 12.0 fL Final    NRBC Automated 12/06/2021 NOT REPORTED  per 100 WBC Final    Differential Type 12/06/2021 NOT REPORTED   Final    Seg Neutrophils 12/06/2021 67* 36 - 66 % Final    Lymphocytes 12/06/2021 24  24 - 44 % Final    Monocytes 12/06/2021 7  1 - 7 % Final    Eosinophils % 12/06/2021 1  0 - 4 % Final    Basophils 12/06/2021 1  0 - 2 % Final    Immature Granulocytes 12/06/2021 NOT REPORTED  0 % Final    Segs Absolute 12/06/2021 4.10  1.3 - 9.1 k/uL Final    Absolute Lymph # 12/06/2021 1.40  1.0 - 4.8 k/uL Final    Absolute Mono # 12/06/2021 0.40  0.1 - 1.3 k/uL Final    Absolute Eos # 12/06/2021 0.10  0.0 - 0.4 k/uL Final    Basophils Absolute 12/06/2021 0.00  0.0 - 0.2 k/uL Final    Absolute Immature Granulocyte 12/06/2021 NOT REPORTED  0.00 - 0.30 k/uL Final    WBC Morphology 12/06/2021 NOT REPORTED   Final    RBC Morphology 12/06/2021 NOT REPORTED   Final    Platelet Estimate 02/23/6576 NOT REPORTED   Final    Glucose 12/06/2021 295* 70 - 99 mg/dL Final    BUN 12/06/2021 15  6 - 20 mg/dL Final    CREATININE 12/06/2021 1.11  0.70 - 1.20 mg/dL Final    Bun/Cre Ratio 12/06/2021 NOT REPORTED  9 - 20 Final    Calcium 12/06/2021 9.0  8.6 - 10.4 mg/dL Final    Sodium 12/06/2021 140  135 - 144 mmol/L Final    Potassium 12/06/2021 4.3  3.7 - 5.3 mmol/L Final    Chloride 12/06/2021 105  98 - 107 mmol/L Final    CO2 12/06/2021 26  20 - 31 mmol/L Final    Anion Gap 12/06/2021 9  9 - 17 mmol/L Final    Alkaline Phosphatase 12/06/2021 82  40 - 129 U/L Final    ALT 12/06/2021 22  5 - 41 U/L Final    AST 12/06/2021 15  <40 U/L Final    Total Bilirubin 12/06/2021 0.31  0.3 - 1.2 mg/dL Final    Total Protein 12/06/2021 5.7* 6.4 - 8.3 g/dL Final    Albumin 12/06/2021 3.4* 3.5 - 5.2 g/dL Final    Albumin/Globulin Ratio 12/06/2021 NOT REPORTED  1.0 - 2.5 Final    GFR Non- 12/06/2021 >60  >60 mL/min Final    GFR  12/06/2021 >60  >60 mL/min Final    GFR Comment 12/06/2021        Final    Comment: Average GFR for 52-63 years old:   80 mL/min/1.73sq m  Chronic Kidney Disease:   <60 mL/min/1.73sq m  Kidney failure:   <15 mL/min/1.73sq m              eGFR calculated using average adult body mass.  Additional eGFR calculator available at:        [email or web address]            GFR Staging 12/06/2021 NOT REPORTED   Final    Ethanol 12/06/2021 <10  <10 mg/dL Final    Ethanol percent 12/06/2021 <0.010  % Final    Amphetamine Screen, Ur 12/06/2021 NEGATIVE  NEGATIVE Final    Comment:       (Positive cutoff 1000 ng/mL)                  Barbiturate Screen, Ur 12/06/2021 NEGATIVE  NEGATIVE Final    Comment:       (Positive cutoff 200 ng/mL)                  Benzodiazepine Screen, Urine 12/06/2021 NEGATIVE  NEGATIVE Final    Comment:       (Positive cutoff 200 ng/mL)                  Cocaine Metabolite, Urine 12/06/2021 POSITIVE* NEGATIVE Final    Comment:       (Positive cutoff 300 ng/mL)                  Methadone Screen, Urine 12/06/2021 NEGATIVE  NEGATIVE Final    Comment:       (Positive cutoff 300 ng/mL)                  Opiates, Urine 12/06/2021 NEGATIVE  NEGATIVE Final    Comment:       (Positive cutoff 300 ng/mL)                  Phencyclidine, Urine 12/06/2021 NEGATIVE  NEGATIVE Final    Comment:       (Positive cutoff 25 ng/mL)                  Propoxyphene, Urine 12/06/2021 NOT REPORTED  NEGATIVE Final    Cannabinoid Scrn, Ur 12/06/2021 NEGATIVE  NEGATIVE Final    Comment:       (Positive cutoff 50 ng/mL)                  Oxycodone Screen, Ur 12/06/2021 NEGATIVE  NEGATIVE Final    Comment:       (Positive cutoff 100 ng/mL)                  Methamphetamine, Urine 12/06/2021 NOT REPORTED  NEGATIVE Final    Tricyclic Antidepressants, Urine 12/06/2021 NOT REPORTED  NEGATIVE Final    MDMA, Urine 12/06/2021 NOT REPORTED  NEGATIVE Final    Buprenorphine Urine 12/06/2021 NOT REPORTED  NEGATIVE Final    Test Information 12/06/2021 Assay provides medical screening only. The absence of expected drug(s) and/or metabolite(s) may indicate diluted or adulterated urine, limitations of testing or timing of collection. Final    Comment: Testing for legal purposes should be confirmed by another method.   To request confirmation   of test result, please call the lab within 7 days of sample submission.  Acetaminophen Level 12/06/2021 <5* 10 - 30 ug/mL Final    Salicylate Lvl 89/39/5844 <1* 3 - 10 mg/dL Final    Specimen Description 12/06/2021 . NASOPHARYNGEAL SWAB   Final    SARS-CoV-2, Rapid 12/06/2021 Not Detected  Not Detected Final    Comment:       Rapid NAAT:  The specimen is NEGATIVE for SARS-CoV-2, the novel coronavirus associated with   COVID-19. The ID NOW COVID-19 assay is designed to detect the virus that causes COVID-19 in patients   with signs and symptoms of infection who are suspected of COVID-19. An individual without symptoms of COVID-19 and who is not shedding SARS-CoV-2 virus would   expect to have a negative (not detected) result in this assay. Negative results should be treated as presumptive and, if inconsistent with clinical signs   and symptoms or necessary for patient management,  should be tested with an alternative molecular assay. Negative results do not preclude   SARS-CoV-2 infection and   should not be used as the sole basis for patient management decisions. Fact sheet for Healthcare Providers: Maris  Fact sheet for Patients: Maris          Methodology: Isothermal Nucleic Acid Amplification      POC Glucose 12/06/2021 310* 75 - 110 mg/dL Final    POC Glucose 12/06/2021 307* 75 - 110 mg/dL Final    POC Glucose 12/06/2021 100  75 - 110 mg/dL Final    POC Glucose 12/07/2021 232* 75 - 110 mg/dL Final    Hemoglobin A1C 12/06/2021 8.6* 4.0 - 6.0 % Final    Estimated Avg Glucose 12/06/2021 200  mg/dL Final    Comment: The ADA and AACC recommend providing the estimated average glucose result to permit better   patient understanding of their HBA1c result.       POC Glucose 12/07/2021 333* 75 - 110 mg/dL Final    POC Glucose 12/07/2021 212* 75 - 110 mg/dL Final    POC Glucose 12/07/2021 160* 75 - 110 mg/dL Final    POC Glucose 12/08/2021 289* 75 - 110 mg/dL Final    POC Glucose 12/08/2021 209* 75 - 110 mg/dL Final         Reviewed patient's current plan of care and vital signs with nursing staff. Labs reviewed: [x] Yes  Last EKG in EMR reviewed: [x] Yes  QTc: 417    Medications  Current Facility-Administered Medications: ondansetron (ZOFRAN-ODT) disintegrating tablet 8 mg, 8 mg, Oral, Q8H PRN  lisinopril (PRINIVIL;ZESTRIL) tablet 10 mg, 10 mg, Oral, Daily  glucose (GLUTOSE) 40 % oral gel 15 g, 15 g, Oral, PRN  dextrose 50 % IV solution, 12.5 g, IntraVENous, PRN  glucagon (rDNA) injection 1 mg, 1 mg, IntraMUSCular, PRN  dextrose 5 % solution, 100 mL/hr, IntraVENous, PRN  insulin lispro (HUMALOG) injection vial 0-6 Units, 0-6 Units, SubCUTAneous, TID WC  insulin lispro (HUMALOG) injection vial 0-3 Units, 0-3 Units, SubCUTAneous, Nightly  metFORMIN (GLUCOPHAGE) tablet 1,000 mg, 1,000 mg, Oral, BID WC  pantoprazole (PROTONIX) tablet 40 mg, 40 mg, Oral, QAM AC  pravastatin (PRAVACHOL) tablet 20 mg, 20 mg, Oral, Daily  glipiZIDE (GLUCOTROL) tablet 5 mg, 5 mg, Oral, BID AC  gabapentin (NEURONTIN) capsule 800 mg, 800 mg, Oral, TID  QUEtiapine (SEROQUEL) tablet 300 mg, 300 mg, Oral, Nightly  sertraline (ZOLOFT) tablet 50 mg, 50 mg, Oral, Daily  traZODone (DESYREL) tablet 150 mg, 150 mg, Oral, Nightly PRN  buprenorphine-naloxone (SUBOXONE) 8-2 MG SL film 1 Film, 1 Film, SubLINGual, BID  acetaminophen (TYLENOL) tablet 650 mg, 650 mg, Oral, Q4H PRN  ibuprofen (ADVIL;MOTRIN) tablet 400 mg, 400 mg, Oral, Q6H PRN  aluminum & magnesium hydroxide-simethicone (MAALOX) 200-200-20 MG/5ML suspension 30 mL, 30 mL, Oral, Q6H PRN  polyethylene glycol (GLYCOLAX) packet 17 g, 17 g, Oral, Daily PRN  hydrOXYzine (ATARAX) tablet 50 mg, 50 mg, Oral, TID PRN  nicotine polacrilex (NICORETTE) gum 2 mg, 2 mg, Oral, Q1H PRN    ASSESSMENT  Schizoaffective disorder, depressive type (HCC)         PLAN  Patient symptoms are: Remains Unstable. Continue current medication regimen.    Titrate Zoloft to 75 mg daily  Monitor need and frequency of PRN medications. Encourage participation in groups and milieu. Attempt to develop insight. Psycho-education conducted. Supportive Therapy conducted. Probable discharge is to be determined by MD.   Follow-up daily while inpatient. Patient continues to be monitored in the inpatient psychiatric facility at [facility] for safety and stabilization. Patient continues to need, on a daily basis, active treatment furnished directly by or requiring the supervision of inpatient psychiatric personnel. Electronically signed by KRISTINA Mtz CNP on 12/8/2021 at 2:35 PM    **This report has been created using voice recognition software. It may contain minor errors which are inherent in voice recognition technology. **                                          Psychiatry Attending Attestation     I independently saw and evaluated the patient. I reviewed the Advance Practice Provider's documentation above. Any additional comments or changes to the Advance Practice Provider's documentation are stated below otherwise agree with assessment. Patient reports that his suicidal thoughts are largely unchanged. Reports having an active plan but is able to contract for safety. Reports feeling very helpless and hopeless. Appears very tired and somnolent today. Denies any significant side effect from the medication. Reports that he had good sleep last night. He has good appetite. Continues to have very poor attention and concentration. Agree with the plan to optimize Zoloft. More than 16 minutes of the session was spent doing supportive psychotherapy.   Session started at around noon and ended at around 12:30 PM.     Electronically signed by Les Mendoza MD on 12/8/21 at 4:33 PM EST

## 2021-12-08 NOTE — PLAN OF CARE
[facility]  Day 3 Interdisciplinary Treatment Plan NOTE    Review Date & Time: 12/8/2021  0947     Admission Type:   Admission Type: Voluntary    Reason for admission:  Reason for Admission: Off medications one week, increase in audtiory hallucinations and sucidal thoughts  Estimated Length of Stay: 5-7 days  Estimated Discharge Date Update: to be determined by physician    PATIENT STRENGTHS:  Patient Strengths Strengths: Motivated, No significant Physical Illness  Patient Strengths and Limitations:Limitations: Tendency to isolate self, Demonstrates discomfort with /lack of social skills, Difficulty problem solving/relies on others to help solve problems, Multiple barriers to leisure interests  Addictive Behavior:Addictive Behavior  In the past 3 months, have you felt or has someone told you that you have a problem with:  : None  Do you have a history of Chemical Use?: No  Do you have a history of Alcohol Use?: No  Do you have a history of Street Drug Abuse?: Yes  Histroy of Prescripton Drug Abuse?: No  Medical Problems:  Past Medical History:   Diagnosis Date    Acute depression 9/8/2018    Back pain     DM2 (diabetes mellitus, type 2) ([facility] 4/21/2014    Generalized OA     GERD (gastroesophageal reflux disease)     HTN (hypertension)     Schizoaffective disorder, bipolar type ([facility] 3/29/2019    Unspecified sleep apnea     c  pap       Risk:  Fall RiskTotal: 63  Mick Scale Mick Scale Score: 21  BVC    Change in scores no Changes to plan of Care no    Status EXAM:   Status and Exam  Normal: No  Facial Expression: Avoids Gaze, Flat  Affect: Appropriate  Level of Consciousness: Alert  Mood:Normal: No  Mood: Depressed, Anxious, Sad  Motor Activity:Normal: Yes  Interview Behavior: Cooperative  Preception: [place] to Person, [place] to Time, [place] to Place, [place] to Situation  Attention:Normal: Yes  Attention: Distractible  Thought Processes: Blocking  Thought Content:Normal: No  Thought Content: Poverty of Content  Hallucinations: None  Delusions: No  Memory:Normal: Yes  Insight and Judgment: No  Insight and Judgment: Poor Judgment, Poor Insight  Present Suicidal Ideation: No  Present Homicidal Ideation: No    Daily Assessment Last Entry:   Daily Sleep (WDL): Within Defined Limits         Patient Currently in Pain: Yes  Daily Nutrition (WDL): Within Defined Limits    Patient Monitoring:  Frequency of Checks: 4 times per hour, close    Psychiatric Symptoms:   Depression Symptoms  Depression Symptoms: Feelings of worthlessness, Impaired concentration, Change in energy level, Feelings of hopelessess  Anxiety Symptoms  Anxiety Symptoms: Generalized  [place] Symptoms  Z[place]ima Symptoms: No problems reported or observed. Psychosis Symptoms  Delusion Type: No problems reported or observed. Suicide Risk CSSR-S:  1) Within the past month, have you wished you were dead or wished you could go to sleep and not wake up? : Yes  2) Have you actually had any thoughts of killing yourself? : Yes  3) Have you been thinking about how you might kill yourself? : Yes  5) Have you started to work out or worked out the details of how to kill yourself?  Do you intend to carry out this plan? : No  6) Have you ever done anything, started to do anything, or prepared to do anything to end your life?: No  Change in Result no Change in Plan of care no      EDUCATION:   EDUCATION:   Learner Progress Toward Treatment Goals: Reviewed results and recommendations of this team, Reviewed group plan and strategies, Reviewed signs, symptoms and risk of self harm and violent behavior, Reviewed goals and plan of care    Method:small group, individual verbal education    Outcome:verbalized by patient, but needs reinforcement to obtain goals    PATIENT GOALS:  Short term: pt refuses to attend tx planning to develop goals   Long term: pt refuses to attend tx planning to devlelop goals    PLAN/TREATMENT RECOMMENDATIONS UPDATE: continue with group therapies, increased socialization, continue planning for after discharge goals, continue with medication compliance    SHORT-TERM GOALS UPDATE:   Time frame for Short-Term Goals: 5-7 days    LONG-TERM GOALS UPDATE:   Time frame for Long-Term Goals: 6 months  Members Present in Team Meeting: See Signature Sheet    YOSEF Gaona

## 2021-12-08 NOTE — PROGRESS NOTES
Formerly [facility], [facility] Internal Medicine    CONSULTATION / HISTORY AND PHYSICAL EXAMINATION            Date:   12/8/2021  Patient name:  Torsten Dacosta  Date of admission:  12/6/2021 10:08 AM  MRN:   [id number]  Account:  [de-identified]  YOB: 1965  PCP:    Ethelle Kayser, APRN - CNP  Room:   [facility]0116-01  Code Status:    Full Code    Physician Requesting Consult: Alley Fine, *    Reason for Consult:  medical management    Chief Complaint:     Chief Complaint   Patient presents with    Suicidal       History Obtained From:     Patient medical record nursing staff    History of Present Illness:   Patient mated to [facility] floor with major depression  Entered medicine consulted for management of diabetes  Patient has multiple medical problems and with diabetes, hypertension, obstructive sleep apnea. Not compliant with diet and medication. He was not taking his medication including Metformin, glimepiride and lisinopril for at least 6 months  Does not check his blood sugars regularly    Past Medical History:     Past Medical History:   Diagnosis Date    Acute depression 9/8/2018    Back pain     DM2 (diabetes mellitus, type 2) ([facility] [address] 4/21/2014    Generalized OA     GERD (gastroesophageal reflux disease)     HTN (hypertension)     Schizoaffective disorder, bipolar type ([facility] [address] 3/29/2019    Unspecified sleep apnea     c  pap        Past Surgical History:     Past Surgical History:   Procedure Laterality Date    COLONOSCOPY      TONSILLECTOMY AND ADENOIDECTOMY          Medications Prior to Admission:     Prior to Admission medications    Medication Sig Start Date End Date Taking? Authorizing Provider   buprenorphine-naloxone (SUBOXONE) 8-2 MG FILM SL film Place 1 Film under the tongue 2 times daily.  Indications: last fill 12/3/21 for an 8 day supply   Yes Historical Provider, MD   hydrOXYzine (ATARAX) 50 MG tablet Take 50 mg by mouth 3 times daily as needed for Anxiety Yes Historical Provider, MD   QUEtiapine (SEROQUEL) 300 MG tablet Take 600 mg by mouth nightly   Yes Historical Provider, MD   pravastatin (PRAVACHOL) 20 MG tablet Take 20 mg by mouth daily   Yes Historical Provider, MD   meloxicam (MOBIC) 15 MG tablet Take 15 mg by mouth daily   Yes Historical Provider, MD   vitamin D (CHOLECALCIFEROL) 25 MCG (1000 UT) TABS tablet Take 1,000 Units by mouth daily   Yes Historical Provider, MD   lisinopril (PRINIVIL;ZESTRIL) 10 MG tablet Take 10 mg by mouth daily    Yes Historical Provider, MD   metFORMIN (GLUCOPHAGE) 1000 MG tablet Take 1 tablet by mouth 2 times daily (with meals) 3/25/20  Yes Bandar Villareal MD   traZODone (DESYREL) 150 MG tablet Take 150 mg by mouth nightly as needed for Sleep    Yes Historical Provider, MD   gabapentin (NEURONTIN) 800 MG tablet Take 800 mg by mouth 3 times daily. Indications: last fill 12/1/21 for a 30 day supply    Yes Historical Provider, MD   pantoprazole (PROTONIX) 40 MG tablet Take 1 tablet by mouth every morning (before breakfast) 1/12/20  Yes Bandar Villareal MD   sertraline (ZOLOFT) 100 MG tablet Take 1 tablet by mouth daily 4/1/19  Yes Jaison Zuñiga MD        Allergies:     Patient has no known allergies. Social History:     Tobacco:    reports that he has been smoking cigarettes. He has a 20.50 pack-year smoking history. He has never used smokeless tobacco.  Alcohol:      reports no history of alcohol use. Drug Use:  reports current drug use. Drugs: Marijuana (Weed) and Cocaine. Family History:     Family History   Problem Relation Age of Onset    Diabetes Mother        Review of Systems:     Positive and Negative as described in HPI. CONSTITUTIONAL:  negative for fevers, chills, sweats, fatigue, weight loss  HEENT:  negative for vision, hearing changes, runny nose, throat pain  RESPIRATORY:  negative for shortness of breath, cough, congestion, wheezing. CARDIOVASCULAR:  negative for chest pain, palpitations.   GASTROINTESTINAL: negative for nausea, vomiting, diarrhea, constipation, change in bowel habits, abdominal pain   GENITOURINARY:  negative for difficulty of urination, burning with urination, frequency   INTEGUMENT:  negative for rash, skin lesions, easy bruising   HEMATOLOGIC/LYMPHATIC:  negative for swelling/edema   ALLERGIC/IMMUNOLOGIC:  negative for urticaria , itching  ENDOCRINE:  negative increase in drinking, increase in urination, hot or cold intolerance  MUSCULOSKELETAL:  negative joint pains, muscle aches, swelling of joints  NEUROLOGICAL:  negative for headaches, dizziness, lightheadedness, numbness, pain, tingling extremities  BEHAVIOR/PSYCH:      Physical Exam:     /67   Pulse 97   Temp 97.5 °F (36.4 °C) (Oral)   Resp 14   Ht 5' 5\" (1.651 m)   Wt 230 lb (104.3 kg)   SpO2 100%   BMI 38.27 kg/m²   Temp (24hrs), Av.5 °F (36.4 °C), Min:97.5 °F (36.4 °C), Max:97.5 °F (36.4 °C)    Recent Labs     21  1647 21  2043 21  0722 21  1117   POCGLU 212* 160* 289* 209*     No intake or output data in the 24 hours ending 21 1428    General Appearance:  alert, well appearing, and in no acute distress  Mental status: oriented to person, place, and time with normal affect  Head:  normocephalic, atraumatic. Eye: no icterus, redness, pupils equal and reactive, extraocular eye movements intact, conjunctiva clear  Ear: normal external ear, no discharge, hearing intact  Nose:  no drainage noted  Mouth: mucous membranes moist  Neck: supple, no carotid bruits, thyroid not palpable  Lungs: Bilateral equal air entry, clear to ausculation, no wheezing, rales or rhonchi, normal effort  Cardiovascular: normal rate, regular rhythm, no murmur, gallop, rub.   Abdomen: Soft, nontender, nondistended, normal bowel sounds, no hepatomegaly or splenomegaly  Neurologic: There are no new focal motor or sensory deficits, normal muscle tone and bulk, no abnormal sensation, normal speech, cranial nerves II through XII grossly intact  Skin: No gross lesions, rashes, bruising or bleeding on exposed skin area  Extremities:  peripheral pulses palpable, no pedal edema or calf pain with palpation  Psych: Investigations:      Laboratory Testing:  Recent Results (from the past 24 hour(s))   POC Glucose Fingerstick    Collection Time: 12/07/21  4:47 PM   Result Value Ref Range    POC Glucose 212 (H) 75 - 110 mg/dL   POC Glucose Fingerstick    Collection Time: 12/07/21  8:43 PM   Result Value Ref Range    POC Glucose 160 (H) 75 - 110 mg/dL   POC Glucose Fingerstick    Collection Time: 12/08/21  7:22 AM   Result Value Ref Range    POC Glucose 289 (H) 75 - 110 mg/dL   POC Glucose Fingerstick    Collection Time: 12/08/21 11:17 AM   Result Value Ref Range    POC Glucose 209 (H) 75 - 110 mg/dL           Consultations:   IP CONSULT TO INTERNAL MEDICINE  Assessment :      Primary Problem  Schizoaffective disorder, depressive type [facility])    Active Hospital Problems    Diagnosis Date Noted    Depression with suicidal ideation [F32. A, R45.851] 12/06/2021    Schizoaffective disorder, depressive type ([facility] [address] [F25.1] 01/06/2020    DM2 (diabetes mellitus, type 2) ([facility] [address] [E11.9] 04/21/2014       Plan:     1. Diabetes, uncontrolled, last HbA1c is 12.3, repeating HbA1c  2. Starting patient Metformin, glimepiride, point-of-care glucose, hypoglycemia protocol, sliding scale  3. Patient does not want to change his diet to low-carb diet, will continue with regular diet for now  4.  Hypertension, restarted home dose of lisinopril  Substance abuse, urine positive for cocaine, hyperglycemia is also contributed due to sympathomimetic effect of cocaine and noncompliance, educated about substance abuse  12/8   Patient blood pressure, is okay  Blood sugars slightly better but still running high  Patient not compliant with diet  Educated about low-carb diet      Derrick Huston MD  12/8/2021  2:28 PM    Copy sent to Dr. Paulo Landau, APRN - CNP    Please note that this chart was generated using voice recognition Dragon dictation software. Although every effort was made to ensure the accuracy of this automated transcription, some errors in transcription may have occurred.

## 2021-12-08 NOTE — BH NOTE
Pt declines to attend goal group. Pt offered talk time as an alternative to group which pt also declines.

## 2021-12-09 LAB
GLUCOSE BLD-MCNC: 157 MG/DL (ref 75–110)
GLUCOSE BLD-MCNC: 175 MG/DL (ref 75–110)
GLUCOSE BLD-MCNC: 260 MG/DL (ref 75–110)
GLUCOSE BLD-MCNC: 339 MG/DL (ref 75–110)

## 2021-12-09 PROCEDURE — 6370000000 HC RX 637 (ALT 250 FOR IP)

## 2021-12-09 PROCEDURE — 82947 ASSAY GLUCOSE BLOOD QUANT: CPT

## 2021-12-09 PROCEDURE — 6370000000 HC RX 637 (ALT 250 FOR IP): Performed by: PSYCHIATRY & NEUROLOGY

## 2021-12-09 PROCEDURE — 6370000000 HC RX 637 (ALT 250 FOR IP): Performed by: INTERNAL MEDICINE

## 2021-12-09 PROCEDURE — 99232 SBSQ HOSP IP/OBS MODERATE 35: CPT | Performed by: PSYCHIATRY & NEUROLOGY

## 2021-12-09 PROCEDURE — APPSS30 APP SPLIT SHARED TIME 16-30 MINUTES

## 2021-12-09 PROCEDURE — 99232 SBSQ HOSP IP/OBS MODERATE 35: CPT | Performed by: INTERNAL MEDICINE

## 2021-12-09 PROCEDURE — 1240000000 HC EMOTIONAL WELLNESS R&B

## 2021-12-09 RX ORDER — QUETIAPINE FUMARATE 200 MG/1
200 TABLET, FILM COATED ORAL NIGHTLY
Status: DISCONTINUED | OUTPATIENT
Start: 2021-12-09 | End: 2021-12-10 | Stop reason: HOSPADM

## 2021-12-09 RX ORDER — GLIPIZIDE 10 MG/1
10 TABLET ORAL
Status: DISCONTINUED | OUTPATIENT
Start: 2021-12-09 | End: 2021-12-10 | Stop reason: HOSPADM

## 2021-12-09 RX ADMIN — GLIPIZIDE 10 MG: 10 TABLET ORAL at 17:22

## 2021-12-09 RX ADMIN — INSULIN LISPRO 1 UNITS: 100 INJECTION, SOLUTION INTRAVENOUS; SUBCUTANEOUS at 21:07

## 2021-12-09 RX ADMIN — INSULIN LISPRO 3 UNITS: 100 INJECTION, SOLUTION INTRAVENOUS; SUBCUTANEOUS at 12:46

## 2021-12-09 RX ADMIN — GABAPENTIN 800 MG: 400 CAPSULE ORAL at 21:08

## 2021-12-09 RX ADMIN — METFORMIN HYDROCHLORIDE 1000 MG: 500 TABLET ORAL at 17:22

## 2021-12-09 RX ADMIN — GABAPENTIN 800 MG: 400 CAPSULE ORAL at 15:20

## 2021-12-09 RX ADMIN — BUPRENORPHINE AND NALOXONE 1 FILM: 8; 2 FILM BUCCAL; SUBLINGUAL at 21:07

## 2021-12-09 RX ADMIN — QUETIAPINE FUMARATE 200 MG: 200 TABLET ORAL at 21:08

## 2021-12-09 RX ADMIN — INSULIN LISPRO 1 UNITS: 100 INJECTION, SOLUTION INTRAVENOUS; SUBCUTANEOUS at 17:22

## 2021-12-09 ASSESSMENT — PAIN SCALES - GENERAL: PAINLEVEL_OUTOF10: 5

## 2021-12-09 NOTE — PLAN OF CARE
room, quiet time, 1:1 talk time with staff, walking, counting, deep breathing, bubbles, alphabet games, reading, audio books, drawing, crafts and models, stress ball, puzzles, massage, guided imagery. Problem: Pain:  Goal: Control of chronic pain  Description: Control of chronic pain  Outcome: Ongoing   Patient denies chronic pain this shift, has not asked for PRN pain reduction medication. Patient encouraged to use non-pharmalogical pain reduction measures including distraction, yoga, finger tapping, music, tv, groups, dark room, quiet time, 1:1 talk time with staff, walking, counting, deep breathing, bubbles, alphabet games, reading, audio books, drawing, crafts and models, stress ball, puzzles, massage, guided imagery.

## 2021-12-09 NOTE — GROUP NOTE
Group Therapy Note    Date: 12/9/2021    Group Start Time: 1000  Group End Time: 2686  Group Topic: Psychotherapy    [address] NAT[address]HA [address], [place]        Group Therapy Note    Attendees: 5/14         Patient was offered group therapy today but declined to participate despite encouragement from staff. 1:1 was offered.       Signature:  NATASHA Wu, LSW

## 2021-12-09 NOTE — PLAN OF CARE
Problem: Depressive Behavior With or Without Suicide Precautions:  Goal: Absence of self-harm  Description: Absence of self-harm  12/8/2021 2011 by Abby Lesches, RN  Outcome: Ongoing   Denies wanting to harm self. Problem: Tobacco Use:  Goal: Inpatient tobacco use cessation counseling participation  Description: Inpatient tobacco use cessation counseling participation  12/8/2021 2011 by Abby Lesches, RN  Outcome: Ongoing   Declines. Problem: Pain:  Goal: Pain level will decrease  Description: Pain level will decrease  12/8/2021 2011 by Abby Lesches, RN  Outcome: Ongoing   Denies current pain.

## 2021-12-09 NOTE — PROGRESS NOTES
[facility] Internal Medicine    CONSULTATION / HISTORY AND PHYSICAL EXAMINATION            Date:   12/9/2021  Patient name:  Kitty Pallas  Date of admission:  12/6/2021 10:08 AM  MRN:   [id number]  Account:  [de-identified]  YOB: 1965  PCP:    KRISTINA Dasilva CNP  Room:   [address] [place] [address]  Code Status:    Full Code    Physician Requesting Consult: Guerline Quintana, *    Reason for Consult:  medical management    Chief Complaint:     Chief Complaint   Patient presents with    Suicidal       History Obtained From:     Patient medical record nursing staff    History of Present Illness:   Patient mated to [facility] floor with major depression  Entered medicine consulted for management of diabetes  Patient has multiple medical problems and with diabetes, hypertension, obstructive sleep apnea. Not compliant with diet and medication. He was not taking his medication including Metformin, glimepiride and lisinopril for at least 6 months  Does not check his blood sugars regularly    Past Medical History:     Past Medical History:   Diagnosis Date    Acute depression 9/8/2018    Back pain     DM2 (diabetes mellitus, type 2) ([facility] [address] 4/21/2014    Generalized OA     GERD (gastroesophageal reflux disease)     HTN (hypertension)     Schizoaffective disorder, bipolar type ([facility] [address] 3/29/2019    Unspecified sleep apnea     c  pap        Past Surgical History:     Past Surgical History:   Procedure Laterality Date    COLONOSCOPY      TONSILLECTOMY AND ADENOIDECTOMY          Medications Prior to Admission:     Prior to Admission medications    Medication Sig Start Date End Date Taking? Authorizing Provider   buprenorphine-naloxone (SUBOXONE) 8-2 MG FILM SL film Place 1 Film under the tongue 2 times daily.  Indications: last fill 12/3/21 for an 8 day supply   Yes Historical Provider, MD   hydrOXYzine (ATARAX) 50 MG tablet Take 50 mg by mouth 3 times daily as needed for Anxiety Yes Historical Provider, MD   QUEtiapine (SEROQUEL) 300 MG tablet Take 600 mg by mouth nightly   Yes Historical Provider, MD   pravastatin (PRAVACHOL) 20 MG tablet Take 20 mg by mouth daily   Yes Historical Provider, MD   meloxicam (MOBIC) 15 MG tablet Take 15 mg by mouth daily   Yes Historical Provider, MD   vitamin D (CHOLECALCIFEROL) 25 MCG (1000 UT) TABS tablet Take 1,000 Units by mouth daily   Yes Historical Provider, MD   lisinopril (PRINIVIL;ZESTRIL) 10 MG tablet Take 10 mg by mouth daily    Yes Historical Provider, MD   metFORMIN (GLUCOPHAGE) 1000 MG tablet Take 1 tablet by mouth 2 times daily (with meals) 3/25/20  Yes Blu Orozco MD   traZODone (DESYREL) 150 MG tablet Take 150 mg by mouth nightly as needed for Sleep    Yes Historical Provider, MD   gabapentin (NEURONTIN) 800 MG tablet Take 800 mg by mouth 3 times daily. Indications: last fill 12/1/21 for a 30 day supply    Yes Historical Provider, MD   pantoprazole (PROTONIX) 40 MG tablet Take 1 tablet by mouth every morning (before breakfast) 1/12/20  Yes Blu Orozco MD   sertraline (ZOLOFT) 100 MG tablet Take 1 tablet by mouth daily 4/1/19  Yes Hanny Ozuna MD        Allergies:     Patient has no known allergies. Social History:     Tobacco:    reports that he has been smoking cigarettes. He has a 20.50 pack-year smoking history. He has never used smokeless tobacco.  Alcohol:      reports no history of alcohol use. Drug Use:  reports current drug use. Drugs: Marijuana (Weed) and Cocaine. Family History:     Family History   Problem Relation Age of Onset    Diabetes Mother        Review of Systems:     Positive and Negative as described in HPI. CONSTITUTIONAL:  negative for fevers, chills, sweats, fatigue, weight loss  HEENT:  negative for vision, hearing changes, runny nose, throat pain  RESPIRATORY:  negative for shortness of breath, cough, congestion, wheezing. CARDIOVASCULAR:  negative for chest pain, palpitations.   GASTROINTESTINAL: negative for nausea, vomiting, diarrhea, constipation, change in bowel habits, abdominal pain   GENITOURINARY:  negative for difficulty of urination, burning with urination, frequency   INTEGUMENT:  negative for rash, skin lesions, easy bruising   HEMATOLOGIC/LYMPHATIC:  negative for swelling/edema   ALLERGIC/IMMUNOLOGIC:  negative for urticaria , itching  ENDOCRINE:  negative increase in drinking, increase in urination, hot or cold intolerance  MUSCULOSKELETAL:  negative joint pains, muscle aches, swelling of joints  NEUROLOGICAL:  negative for headaches, dizziness, lightheadedness, numbness, pain, tingling extremities  BEHAVIOR/PSYCH:      Physical Exam:     /63   Pulse 104   Temp 97.4 °F (36.3 °C) (Oral)   Resp 16   Ht 5' 5\" (1.651 m)   Wt 230 lb (104.3 kg)   SpO2 100%   BMI 38.27 kg/m²   Temp (24hrs), Av.4 °F (36.3 °C), Min:97.3 °F (36.3 °C), Max:97.4 °F (36.3 °C)    Recent Labs     21  1639 21  2030 21  0749 21  1224   POCGLU 217* 291* 339* 260*     No intake or output data in the 24 hours ending 21 1444    General Appearance:  alert, well appearing, and in no acute distress  Mental status: oriented to person, place, and time with normal affect  Head:  normocephalic, atraumatic. Eye: no icterus, redness, pupils equal and reactive, extraocular eye movements intact, conjunctiva clear  Ear: normal external ear, no discharge, hearing intact  Nose:  no drainage noted  Mouth: mucous membranes moist  Neck: supple, no carotid bruits, thyroid not palpable  Lungs: Bilateral equal air entry, clear to ausculation, no wheezing, rales or rhonchi, normal effort  Cardiovascular: normal rate, regular rhythm, no murmur, gallop, rub.   Abdomen: Soft, nontender, nondistended, normal bowel sounds, no hepatomegaly or splenomegaly  Neurologic: There are no new focal motor or sensory deficits, normal muscle tone and bulk, no abnormal sensation, normal speech, cranial nerves II through XII grossly intact  Skin: No gross lesions, rashes, bruising or bleeding on exposed skin area  Extremities:  peripheral pulses palpable, no pedal edema or calf pain with palpation  Psych: Investigations:      Laboratory Testing:  Recent Results (from the past 24 hour(s))   POC Glucose Fingerstick    Collection Time: 12/08/21  4:39 PM   Result Value Ref Range    POC Glucose 217 (H) 75 - 110 mg/dL   POC Glucose Fingerstick    Collection Time: 12/08/21  8:30 PM   Result Value Ref Range    POC Glucose 291 (H) 75 - 110 mg/dL   POC Glucose Fingerstick    Collection Time: 12/09/21  7:49 AM   Result Value Ref Range    POC Glucose 339 (H) 75 - 110 mg/dL   POC Glucose Fingerstick    Collection Time: 12/09/21 12:24 PM   Result Value Ref Range    POC Glucose 260 (H) 75 - 110 mg/dL           Consultations:   IP CONSULT TO INTERNAL MEDICINE  Assessment :      Primary Problem  Schizoaffective disorder, depressive type [facility])    Active Hospital Problems    Diagnosis Date Noted    Depression with suicidal ideation [F32. A, R45.851] 12/06/2021    Schizoaffective disorder, depressive type ([facility] [address] [F25.1] 01/06/2020    DM2 (diabetes mellitus, type 2) ([facility] [address] [E11.9] 04/21/2014       Plan:     1. Diabetes, uncontrolled, last HbA1c is 12.3, repeating HbA1c  2. Starting patient Metformin, glimepiride, point-of-care glucose, hypoglycemia protocol, sliding scale  3. Patient does not want to change his diet to low-carb diet, will continue with regular diet for now  4.  Hypertension, restarted home dose of lisinopril  Substance abuse, urine positive for cocaine, hyperglycemia is also contributed due to sympathomimetic effect of cocaine and noncompliance, educated about substance abuse  12/8   Patient blood pressure, is okay  Blood sugars slightly better but still running high  Patient not compliant with diet  Educated about low-carb diet  12/9   Patient blood sugars still running high  Increasing glipizide to 10 mg twice a day    Real Intent,

## 2021-12-09 NOTE — GROUP NOTE
Group Therapy Note    Date: 12/9/2021    Group Start Time: 1100  Group End Time: 9614  Group Topic: Music Therapy    [facility] RADHA Lennon        Group Therapy Note    Pt did not attend music therapy group d/t resting in room despite staff invitation to attend. 1:1 talk time offered as alternative to group session, pt declined.

## 2021-12-10 VITALS
HEIGHT: 65 IN | HEART RATE: 90 BPM | BODY MASS INDEX: 38.32 KG/M2 | TEMPERATURE: 98.4 F | DIASTOLIC BLOOD PRESSURE: 79 MMHG | SYSTOLIC BLOOD PRESSURE: 159 MMHG | RESPIRATION RATE: 14 BRPM | WEIGHT: 230 LBS | OXYGEN SATURATION: 100 %

## 2021-12-10 LAB — GLUCOSE BLD-MCNC: 136 MG/DL (ref 75–110)

## 2021-12-10 PROCEDURE — 99239 HOSP IP/OBS DSCHRG MGMT >30: CPT | Performed by: PSYCHIATRY & NEUROLOGY

## 2021-12-10 PROCEDURE — 6370000000 HC RX 637 (ALT 250 FOR IP): Performed by: INTERNAL MEDICINE

## 2021-12-10 PROCEDURE — 6370000000 HC RX 637 (ALT 250 FOR IP): Performed by: PSYCHIATRY & NEUROLOGY

## 2021-12-10 PROCEDURE — 82947 ASSAY GLUCOSE BLOOD QUANT: CPT

## 2021-12-10 PROCEDURE — 6370000000 HC RX 637 (ALT 250 FOR IP)

## 2021-12-10 RX ORDER — SERTRALINE HYDROCHLORIDE 100 MG/1
100 TABLET, FILM COATED ORAL DAILY
Qty: 30 TABLET | Refills: 0 | Status: SHIPPED | OUTPATIENT
Start: 2021-12-10

## 2021-12-10 RX ORDER — TRAZODONE HYDROCHLORIDE 150 MG/1
150 TABLET ORAL NIGHTLY PRN
Qty: 30 TABLET | Refills: 0 | Status: SHIPPED | OUTPATIENT
Start: 2021-12-10

## 2021-12-10 RX ORDER — QUETIAPINE FUMARATE 200 MG/1
200 TABLET, FILM COATED ORAL NIGHTLY
Qty: 30 TABLET | Refills: 0 | Status: SHIPPED | OUTPATIENT
Start: 2021-12-10

## 2021-12-10 RX ADMIN — PANTOPRAZOLE SODIUM 40 MG: 40 TABLET, DELAYED RELEASE ORAL at 07:38

## 2021-12-10 RX ADMIN — SERTRALINE HYDROCHLORIDE 75 MG: 50 TABLET ORAL at 07:38

## 2021-12-10 RX ADMIN — BUPRENORPHINE AND NALOXONE 1 FILM: 8; 2 FILM BUCCAL; SUBLINGUAL at 09:46

## 2021-12-10 RX ADMIN — GLIPIZIDE 10 MG: 10 TABLET ORAL at 07:38

## 2021-12-10 RX ADMIN — METFORMIN HYDROCHLORIDE 1000 MG: 500 TABLET ORAL at 07:38

## 2021-12-10 RX ADMIN — GABAPENTIN 800 MG: 400 CAPSULE ORAL at 09:46

## 2021-12-10 RX ADMIN — PRAVASTATIN SODIUM 20 MG: 20 TABLET ORAL at 07:42

## 2021-12-10 RX ADMIN — LISINOPRIL 10 MG: 10 TABLET ORAL at 07:38

## 2021-12-10 ASSESSMENT — PAIN DESCRIPTION - ORIENTATION: ORIENTATION: LOWER

## 2021-12-10 ASSESSMENT — PAIN DESCRIPTION - LOCATION: LOCATION: BACK

## 2021-12-10 ASSESSMENT — PAIN DESCRIPTION - PAIN TYPE: TYPE: CHRONIC PAIN

## 2021-12-10 ASSESSMENT — PAIN SCALES - GENERAL: PAINLEVEL_OUTOF10: 8

## 2021-12-10 NOTE — GROUP NOTE
Group Therapy Note    Date: 12/10/2021    Group Start Time: 1000  Group End Time: 6623  Group Topic: Psychoeducation    [facility]    NATASHA Sanz LSW        Group Therapy Note    patient refused to attend psychoeducational group at 10:00am after encouragement from staff.        Signature:  NATASHA Sanz LSW

## 2021-12-10 NOTE — PROGRESS NOTES
Patient given tobacco quitline number 12633099064 at this time, refusing to call at this time, states \" I just dont want to quit now\"- patient given information as to the dangers of long term tobacco use. Continue to reinforce the importance of tobacco cessation.

## 2021-12-10 NOTE — PROGRESS NOTES
[facility]  Discharge Note    Pt discharged with followings belongings:   Dental Appliances: None  Vision - Corrective Lenses: None  Hearing Aid: None  Jewelry: None  Body Piercings Removed: No  Clothing: Other (Comment) (see written)  Were All Patient Medications Collected?: No  Other Valuables: Other (Comment), Cell phone, Money (Comment) (see written; $56.22)   Valuables sent home with pt or returned to patient. Patient education on aftercare instructions: yes  Information faxed to [facility] by Shelby Clemons  at 10:58 AM .Patient verbalize understanding of AVS:  yes.     Status EXAM upon discharge:  Status and Exam  Normal: No  Facial Expression: Flat  Affect: Blunt  Level of Consciousness: Alert  Mood:Normal: Yes  Mood: Anxious, Depressed, Anhedonia  Motor Activity:Normal: Yes  Motor Activity: Decreased  Interview Behavior: Cooperative  Preception: [place] to Person, [place] to Time, [place] to Place  Attention:Normal: Yes  Attention: Distractible  Thought Processes: Blocking  Thought Content:Normal: Yes  Thought Content: Preoccupations  Hallucinations: None  Delusions: No  Memory:Normal: Yes  Insight and Judgment: Yes  Insight and Judgment: Poor Insight, Unmotivated  Present Suicidal Ideation: No  Present Homicidal Ideation: No      Metabolic Screening:    Lab Results   Component Value Date    LABA1C 8.6 (H) 12/06/2021       Lab Results   Component Value Date    CHOL 224 (H) 03/24/2021    CHOL 180 01/08/2021    CHOL 189 03/09/2020    CHOL 174 12/09/2016    CHOL 176 12/09/2016    CHOL 190 09/08/2014    CHOL 195 02/24/2014    CHOL 195 02/24/2014     Lab Results   Component Value Date    TRIG 209 (H) 03/24/2021    TRIG 149 01/08/2021    TRIG 157 (H) 03/09/2020    TRIG 95 12/09/2016    TRIG 99 12/09/2016    TRIG 140 09/08/2014    TRIG 113 02/24/2014    TRIG 113 02/24/2014     Lab Results   Component Value Date    HDL 65 03/24/2021    HDL 66 01/08/2021    HDL 74 03/09/2020    HDL 71 02/11/2019    HDL 78 06/27/2017 HDL 70 12/09/2016    HDL 71 12/09/2016    HDL 62 09/08/2014    HDL 61 02/24/2014    HDL 61 02/24/2014     No components found for: LDLCAL  No results found for: LABVLDL    Pt discharged to pharmacy then home by cab. All belongings et valuables sent with pt. Pt verbalizes all discharge instructions along with need to keep all follow up appts.      Giselle Thomas RN

## 2021-12-10 NOTE — PLAN OF CARE
Problem: Depressive Behavior With or Without Suicide Precautions:  Goal: Absence of self-harm  Description: Absence of self-harm  12/9/2021 2024 by Leidy Garcia RN  Outcome: Ongoing   Denies wanting to harm self. Problem: Tobacco Use:  Goal: Inpatient tobacco use cessation counseling participation  Description: Inpatient tobacco use cessation counseling participation  12/9/2021 2024 by Leidy Garcia RN  Outcome: Ongoing   Declines. Problem: Pain:  Goal: Control of acute pain  Description: Control of acute pain  12/9/2021 2024 by Leidy Garcia RN  Outcome: Ongoing   Denies current pain.

## 2021-12-11 NOTE — DISCHARGE SUMMARY
Provider Discharge Summary     Patient ID:  Evan Cuellar  [id number]  89 y.o.  1965    Admit date: 12/6/2021    Discharge date and time: 12/10/2021  8:22 PM     Admitting Physician: Adrian Gandhi MD     Discharge Physician: Adrian Gandhi MD    Admission Diagnoses: Depression with suicidal ideation [F32. A, R45.851]    Discharge Diagnoses:      Schizoaffective disorder, depressive type [facility])     Patient Active Problem List   Diagnosis Code    GERD (gastroesophageal reflux disease) K21.9    Back pain M54.9    Sleep apnea, obstructive G47.33    Dyslipidemia E78.5    DM2 (diabetes mellitus, type 2) ([address] E11.9    Smoking F17.200    Schizoaffective disorder, bipolar type ([address] F25.0    Schizoaffective disorder, depressive type ([address] F25.1    Depression, major, recurrent, severe with psychosis ([address] F33.3    Schizophrenia ([address] F20.9    MDD (major depressive disorder), recurrent episode ([address] F33.9    Depression with suicidal ideation F32. A, R45.851        Admission Condition: poor    Discharged Condition: stable    Indication for Admission: threat to self    History of Present Illnes (present tense wording is of findings from admission exam and are not necessarily indicative of current findings):   Evan Cuellar is a 64 y.o. male who has a past medical history of mental illness, GERD, dyslipidemia, diabetes mellitus type 2 and drug use who presented to the ER with increased depression and suicidal ideation to stab himself due to command auditory hallucinations.      Per ED records, \"Jose Manuel Maya is a single 64 y.o.  male who presents to the ED for current SI -suicidal ideation with a plan to stab himself with a knife and AH - Auditory hallucinations. Patient reports auditory command hallucinations telling him kill himself and told him to stop taking his medications over a week ago.  Patient cannot identify trigger that caused his voices to come back.  Patient reports the voices have become louder and more aggressive since he stopped taking his psychotropic medications about a week and a half ago because the voices told him and he has not been compliant with his diabetic medication.  Patient reports he has not slept in or ate in 2 days he denies any HI VH.  Patient reports depressed mood with increased feelings of hopelessness, helplessness and sadness. Low energy and fatigue. Patient reports he has housing and states that he lives by himself. Patient is linked with [facility] for outpatient mental health services. Patient denies any current or past AOD use or legal issues. Patient is willing to sign in voluntary. \"     Patient is agreeable to interview in his room today. He is somewhat evasive and covers his face with his blanket throughout interview. Patient reports that about a week ago he stopped taking his medications due to command auditory hallucinations telling him to \"stop taking his meds. \"  He reports that he became increasingly depressed and suicidal and had a plan to stab himself in a suicide attempt. Patient is unable to list any stressors that caused the voices or depression. Patient reports that for the past 2 weeks or more he has been down and depressed all day nearly every day. He endorses poor sleep stating that he has a hard time falling asleep and wakes up frequently throughout the night. He states that he probably only gets about \"1-1/2 hours\" of sleep at night. He endorses poor energy and poor concentration. He reports that his appetite has been poor lately and that he \"has not eaten in a couple of days. \"  Patient endorses feelings of hopelessness and helplessness. Patient endorses suicidal ideation with a plan to stab himself. He denies homicidal ideation. He is able to contract for safety on this unit with this writer.   Patient denies a time in his past, absent the use of drugs, where he has gone 3 days or more without sleep and felt like he had all the energy in the world. He denies having grandiose thoughts of himself or an increase in goal-directed activity. Patient endorses auditory hallucinations that can happen absent of a mood component. He reports the voices as a male voice that is [place]. \"  He states the voices command in nature and tell him to \"kill myself's, stab myself, and stopped taking my meds. \"  Patient denies visual hallucinations. Patient denies delusions of reference or thoughts that he can read minds. Patient does endorse paranoia stating that he is often scared that someone is following him or out to get him.     Patient denies excess worry about anything and everything. He states that he sometimes feels restless due to anxiety however he denies ever feeling muscle tension from being keyed up often. He denies a history of panic attacks. He denies obsessive-compulsive thoughts or behaviors. Patient denies to this writer history of trauma including mental, physical, or sexual abuse. He denies symptoms of posttraumatic stress disorder including flashbacks, nightmares, or hypervigilance. Patient reports that his self-esteem is good. He denies a history of self harming behaviors.     Patient denies illicit drug or alcohol use however UDS upon admission is positive for cocaine. Patient also has prescription for Suboxone however he reports that he has not taken it in the past couple of days. Patient Suboxone was prescribed on 12/3 for an 8-day supply. Hospital Course:   Upon admission, Jessica Burkett was provided a safe secure environment, introduced to unit milieu. Patient participated in groups and individual therapies. Meds were adjusted as noted below. After few days of hospital care, patient began to feel improvement. Depression lifted, thoughts to harm self ceased. Sleep improved, appetite was good. On morning rounds 12/10/2021, Jessica Burkett endorses feeling ready for discharge.  Patient denies suicidal or homicidal ideations, denies hallucinations or delusions. Denies SE's from meds. It was decided that maximum benefit from hospital care had been achieved and patient can be discharged. Consults:   none    Significant Diagnostic Studies: Routine labs and diagnostics    Treatments: Psychotropic medications, therapy with group, milieu, and 1:1 with nurses, social workers, PAQueC/CNP, and Attending physician. Discharge Medications:  Discharge Medication List as of 12/10/2021  9:52 AM      CONTINUE these medications which have CHANGED    Details   sertraline (ZOLOFT) 100 MG tablet Take 1 tablet by mouth daily, Disp-30 tablet, R-0Normal      traZODone (DESYREL) 150 MG tablet Take 1 tablet by mouth nightly as needed for Sleep, Disp-30 tablet, R-0Normal      QUEtiapine (SEROQUEL) 200 MG tablet Take 1 tablet by mouth nightly, Disp-30 tablet, R-0Normal         CONTINUE these medications which have NOT CHANGED    Details   buprenorphine-naloxone (SUBOXONE) 8-2 MG FILM SL film Place 1 Film under the tongue 2 times daily. Indications: last fill 12/3/21 for an 8 day supplyHistorical Med      hydrOXYzine (ATARAX) 50 MG tablet Take 50 mg by mouth 3 times daily as needed for AnxietyHistorical Med      pravastatin (PRAVACHOL) 20 MG tablet Take 20 mg by mouth dailyHistorical Med      vitamin D (CHOLECALCIFEROL) 25 MCG (1000 UT) TABS tablet Take 1,000 Units by mouth dailyHistorical Med      lisinopril (PRINIVIL;ZESTRIL) 10 MG tablet Take 10 mg by mouth daily Historical Med      metFORMIN (GLUCOPHAGE) 1000 MG tablet Take 1 tablet by mouth 2 times daily (with meals), Disp-60 tablet, R-3Normal      gabapentin (NEURONTIN) 800 MG tablet Take 800 mg by mouth 3 times daily.  Indications: last fill 12/1/21 for a 30 day supply Historical Med      pantoprazole (PROTONIX) 40 MG tablet Take 1 tablet by mouth every morning (before breakfast), Disp-30 tablet, R-3Normal         STOP taking these medications       meloxicam (MOBIC) 15 MG tablet Comments: Reason for Stopping:         acetaminophen (TYLENOL) 500 MG tablet Comments:   Reason for Stopping:         ibuprofen (IBU) 400 MG tablet Comments:   Reason for Stopping:         polyethylene glycol (GLYCOLAX) 17 GM/SCOOP powder Comments:   Reason for Stopping:         bisacodyl (BISACODYL) 5 MG EC tablet Comments:   Reason for Stopping:         canagliflozin (INVOKANA) 100 MG TABS tablet Comments:   Reason for Stopping:         glimepiride (AMARYL) 4 MG tablet Comments:   Reason for Stopping:                Core Measures statement:   Not applicable    Discharge Exam:  Level of consciousness:  Within normal limits  Appearance: Street clothes, seated, with good grooming  Behavior/Motor: No abnormalities noted  Attitude toward examiner:  Cooperative, attentive, good eye contact  Speech:  spontaneous, normal rate, normal volume and well articulated  Mood:  euthymic  Affect:  Full range  Thought processes:  linear, goal directed and coherent  Thought content:  denies homicidal ideation  Suicidal Ideation:  denies suicidal ideation  Delusions:  no evidence of delusions  Perceptual Disturbance:  denies any perceptual disturbance  Cognition:  Intact  Memory: age appropriate  Insight & Judgement: fair  Medication side effects: denies     Disposition: home    Patient Instructions: Activity: activity as tolerated  1. Patient instructed to take medications regularly and follow up with outpatient appointments. Follow-up as scheduled with [facility]       Signed:    Electronically signed by Charles Cyr MD on 12/10/21 at 8:22 PM EST    Time Spent on discharge is more than 35 minutes in the examination, evaluation, counseling and review of medications and discharge plan.

## 2021-12-13 NOTE — CARE COORDINATION
Name: Héctor Gutierrez    : 1965    Discharge Date: 12/10/2021    Primary Auth/Cert #: [id number]    Destination: Private residience    Discharge Medications:      Medication List      CHANGE how you take these medications    QUEtiapine 200 MG tablet  Commonly known as: SEROQUEL  Take 1 tablet by mouth nightly  What changed:   · medication strength  · how much to take  Notes to patient: Clear thoughts        CONTINUE taking these medications    gabapentin 800 MG tablet  Commonly known as: NEURONTIN  Notes to patient: Mood stabilizer     hydrOXYzine 50 MG tablet  Commonly known as: ATARAX  Notes to patient: anxiety     lisinopril 10 MG tablet  Commonly known as: PRINIVIL;ZESTRIL  Notes to patient: Blood pressure     metFORMIN 1000 MG tablet  Commonly known as: GLUCOPHAGE  Take 1 tablet by mouth 2 times daily (with meals)  Notes to patient: Control blood sugar     pantoprazole 40 MG tablet  Commonly known as: PROTONIX  Take 1 tablet by mouth every morning (before breakfast)  Notes to patient: Stomach health     pravastatin 20 MG tablet  Commonly known as: PRAVACHOL  Notes to patient: Control cholesterol     sertraline 100 MG tablet  Commonly known as: ZOLOFT  Take 1 tablet by mouth daily  Notes to patient: depression     Suboxone 8-2 MG Film SL film  Generic drug: buprenorphine-naloxone  Notes to patient: Opiate dependency     traZODone 150 MG tablet  Commonly known as: DESYREL  Take 1 tablet by mouth nightly as needed for Sleep  Notes to patient: Sleep aid     vitamin D 25 MCG (1000 UT) Tabs tablet  Commonly known as: CHOLECALCIFEROL  Notes to patient: vitamin        STOP taking these medications    meloxicam 15 MG tablet  Commonly known as: MOBIC           Where to Get Your Medications      These medications were sent to [address] [place] [address] [address] [facility]    Phone: [phone]   · QUEtiapine 200 MG tablet  · sertraline 100 MG tablet  · traZODone 150 MG tablet         Follow Up Appointment: Deb Sands [id number]  [phone]  On 12/14/2021  You have a scheduled appointment for follow up at 10:30 AM

## 2022-04-12 ENCOUNTER — HOSPITAL ENCOUNTER (OUTPATIENT)
Age: 57
Discharge: HOME | End: 2022-04-12
Payer: COMMERCIAL

## 2022-04-12 LAB
ABSOLUTE EOS #: 0.05 K/UL (ref 0–0.44)
ABSOLUTE IMMATURE GRANULOCYTE: <0.03 K/UL (ref 0–0.3)
ABSOLUTE LYMPH #: 1.46 K/UL (ref 1.1–3.7)
ABSOLUTE MONO #: 0.57 K/UL (ref 0.1–1.2)
ALBUMIN SERPL-MCNC: 4 G/DL (ref 3.5–5.2)
ALBUMIN/GLOBULIN RATIO: 1.9 (ref 1–2.5)
ALP BLD-CCNC: 86 U/L (ref 40–129)
ALT SERPL-CCNC: 37 U/L (ref 5–41)
ANION GAP SERPL CALCULATED.3IONS-SCNC: 12 MMOL/L (ref 9–17)
AST SERPL-CCNC: 33 U/L
BASOPHILS # BLD: 1 % (ref 0–2)
BASOPHILS ABSOLUTE: 0.03 K/UL (ref 0–0.2)
BILIRUB SERPL-MCNC: 0.71 MG/DL (ref 0.3–1.2)
BILIRUBIN URINE: NEGATIVE
BUN BLDV-MCNC: 12 MG/DL (ref 6–20)
CALCIUM SERPL-MCNC: 9.2 MG/DL (ref 8.6–10.4)
CHLORIDE BLD-SCNC: 104 MMOL/L (ref 98–107)
CHOLESTEROL, FASTING: 184 MG/DL
CHOLESTEROL/HDL RATIO: 2.6
CO2: 24 MMOL/L (ref 20–31)
COLOR: YELLOW
COMMENT UA: NORMAL
CREAT SERPL-MCNC: 0.91 MG/DL (ref 0.7–1.2)
CREATININE URINE: 120.9 MG/DL (ref 39–259)
EOSINOPHILS RELATIVE PERCENT: 1 % (ref 1–4)
ESTIMATED AVERAGE GLUCOSE: 197 MG/DL
GFR AFRICAN AMERICAN: >60 ML/MIN
GFR NON-AFRICAN AMERICAN: >60 ML/MIN
GFR SERPL CREATININE-BSD FRML MDRD: ABNORMAL ML/MIN/{1.73_M2}
GLUCOSE BLD-MCNC: 167 MG/DL (ref 70–99)
GLUCOSE URINE: NEGATIVE
HBA1C MFR BLD: 8.5 % (ref 4–6)
HCT VFR BLD CALC: 40.3 % (ref 40.7–50.3)
HDLC SERPL-MCNC: 70 MG/DL
HEMOGLOBIN: 14.6 G/DL (ref 13–17)
IMMATURE GRANULOCYTES: 0 %
KETONES, URINE: NEGATIVE
LDL CHOLESTEROL: 82 MG/DL (ref 0–130)
LEUKOCYTE ESTERASE, URINE: NEGATIVE
LYMPHOCYTES # BLD: 29 % (ref 24–43)
MCH RBC QN AUTO: 29.3 PG (ref 25.2–33.5)
MCHC RBC AUTO-ENTMCNC: 36.2 G/DL (ref 28.4–34.8)
MCV RBC AUTO: 80.9 FL (ref 82.6–102.9)
MICROALBUMIN/CREAT 24H UR: <12 MG/L
MICROALBUMIN/CREAT UR-RTO: NORMAL MCG/MG CREAT
MONOCYTES # BLD: 11 % (ref 3–12)
NITRITE, URINE: NEGATIVE
NRBC AUTOMATED: 0 PER 100 WBC
PDW BLD-RTO: 13.8 % (ref 11.8–14.4)
PH UA: 5 (ref 5–8)
PLATELET # BLD: 350 K/UL (ref 138–453)
PMV BLD AUTO: 9.4 FL (ref 8.1–13.5)
POTASSIUM SERPL-SCNC: 4.8 MMOL/L (ref 3.7–5.3)
PROSTATE SPECIFIC ANTIGEN: 0.19 UG/L
PROTEIN UA: NEGATIVE
RBC # BLD: 4.98 M/UL (ref 4.21–5.77)
RBC # BLD: ABNORMAL 10*6/UL
SEG NEUTROPHILS: 58 % (ref 36–65)
SEGMENTED NEUTROPHILS ABSOLUTE COUNT: 2.88 K/UL (ref 1.5–8.1)
SODIUM BLD-SCNC: 140 MMOL/L (ref 135–144)
SPECIFIC GRAVITY UA: 1.02 (ref 1–1.03)
THYROXINE, FREE: 1.29 NG/DL (ref 0.93–1.7)
TOTAL PROTEIN: 6.1 G/DL (ref 6.4–8.3)
TRIGLYCERIDE, FASTING: 162 MG/DL
TSH SERPL DL<=0.05 MIU/L-ACNC: 0.78 UIU/ML (ref 0.3–5)
TURBIDITY: CLEAR
URINE HGB: NEGATIVE
UROBILINOGEN, URINE: NORMAL
VITAMIN D 25-HYDROXY: 9.3 NG/ML
WBC # BLD: 5 K/UL (ref 3.5–11.3)

## 2022-04-12 PROCEDURE — 84443 ASSAY THYROID STIM HORMONE: CPT

## 2022-04-12 PROCEDURE — 82570 ASSAY OF URINE CREATININE: CPT

## 2022-04-12 PROCEDURE — 80061 LIPID PANEL: CPT

## 2022-04-12 PROCEDURE — 82043 UR ALBUMIN QUANTITATIVE: CPT

## 2022-04-12 PROCEDURE — 82306 VITAMIN D 25 HYDROXY: CPT

## 2022-04-12 PROCEDURE — 80053 COMPREHEN METABOLIC PANEL: CPT

## 2022-04-12 PROCEDURE — 81003 URINALYSIS AUTO W/O SCOPE: CPT

## 2022-04-12 PROCEDURE — 36415 COLL VENOUS BLD VENIPUNCTURE: CPT

## 2022-04-12 PROCEDURE — 84153 ASSAY OF PSA TOTAL: CPT

## 2022-04-12 PROCEDURE — 85025 COMPLETE CBC W/AUTO DIFF WBC: CPT

## 2022-04-12 PROCEDURE — 84439 ASSAY OF FREE THYROXINE: CPT

## 2022-04-12 PROCEDURE — 83036 HEMOGLOBIN GLYCOSYLATED A1C: CPT

## 2022-05-09 ENCOUNTER — TELEPHONE (OUTPATIENT)
Dept: GASTROENTEROLOGY | Age: 57
End: 2022-05-09

## 2022-05-24 NOTE — TELEPHONE ENCOUNTER
3rd and final attempt; called patient and LVM regarding colon screen referral.  Sending referral back to provider, three attempts have been made to schedule referral. Another call attempt to contact pt.  Writer attempted to contact pt. A message was left to call back and discuss med further.     Things that needs to be discussed with pt;  -How many tabs does she have left and reconfirm how meds is taken.?  -According to PDMP; med was dispensed by [facility] on 4/20/20 for 10 days of supply. Pt should not be due until 4/30/20 (which is tomorrow).  -However, Pt reported taking \"1/2 a pill every other day\". The pt should have enough until 5/10/20. If that's the case, med is too soon to renew.

## 2023-01-13 NOTE — BH NOTE
AFTERNOON WELLNESS GROUP NOTE    Pt was encouraged to attend afternoon wellness group at 4:00pm-4:45pm but pt declined. Will continue to encourage pt to attend groups. Patient was sent in on triage line,Patient is Dizzy and it has been this way since he diagnosed with Flu during [address], he went to urgent care 01/09 for dizziness and head congestion and was given antibiotic and otc Nasal spray no fever , dizziness mostly in a.m. Gato Brown patient states he made an appointment with ENT and cant get in until  March and is asking if Dr Andrea Cruz could send him to another ENT in [facility] to hopefully get him in soon, I did suggest patient go to ER and he agreed and will call us for a follow up with Dr Andrea Cruz.  Please let patient know if you are will to refer them to another ENT inside [facility]

## 2023-01-24 NOTE — BH NOTE
Writer spoke to Souleymane at [employer] on [address] to verify patient's blood pressure medications. Tim informed writer that patient last filled his lisinopril January 15, 2019. none My signature below certifies that the above stated patient is homebound and upon completion of the Face-To-Face encounter, has the need for intermittent skilled nursing, physical therapy and/or speech or occupational therapy services in their home for their current diagnosis as outlined in their initial plan of care. These services will continue to be monitored by myself or another physician.

## 2023-01-31 RX ORDER — PSEUDOEPHED/ACETAMINOPH/DIPHEN 30MG-500MG
TABLET ORAL
Qty: 120 TABLET | Refills: 1 | OUTPATIENT
Start: 2023-01-31

## 2023-04-16 ENCOUNTER — APPOINTMENT (OUTPATIENT)
Dept: GENERAL RADIOLOGY | Age: 58
End: 2023-04-16
Payer: COMMERCIAL

## 2023-04-16 ENCOUNTER — HOSPITAL ENCOUNTER (EMERGENCY)
Age: 58
Discharge: HOME | End: 2023-04-16
Attending: EMERGENCY MEDICINE
Payer: COMMERCIAL

## 2023-04-16 VITALS
WEIGHT: 215 LBS | OXYGEN SATURATION: 99 % | SYSTOLIC BLOOD PRESSURE: 137 MMHG | DIASTOLIC BLOOD PRESSURE: 92 MMHG | HEART RATE: 112 BPM | TEMPERATURE: 98.3 F | BODY MASS INDEX: 35.82 KG/M2 | RESPIRATION RATE: 16 BRPM | HEIGHT: 65 IN

## 2023-04-16 DIAGNOSIS — K20.90 ESOPHAGITIS: ICD-10-CM

## 2023-04-16 DIAGNOSIS — K29.00 OTHER ACUTE GASTRITIS WITHOUT HEMORRHAGE: Primary | ICD-10-CM

## 2023-04-16 DIAGNOSIS — R10.13 EPIGASTRIC PAIN: ICD-10-CM

## 2023-04-16 LAB
ABSOLUTE EOS #: 0.04 K/UL (ref 0–0.44)
ABSOLUTE IMMATURE GRANULOCYTE: 0.03 K/UL (ref 0–0.3)
ABSOLUTE LYMPH #: 1.51 K/UL (ref 1.1–3.7)
ABSOLUTE MONO #: 1.04 K/UL (ref 0.1–1.2)
ALBUMIN SERPL-MCNC: 4.3 G/DL (ref 3.5–5.2)
ALBUMIN/GLOBULIN RATIO: 1.5 (ref 1–2.5)
ALP SERPL-CCNC: 116 U/L (ref 40–129)
ALT SERPL-CCNC: 32 U/L (ref 5–41)
ANION GAP SERPL CALCULATED.3IONS-SCNC: 10 MMOL/L (ref 9–17)
AST SERPL-CCNC: 29 U/L
BASOPHILS # BLD: 0 % (ref 0–2)
BASOPHILS ABSOLUTE: <0.03 K/UL (ref 0–0.2)
BILIRUB SERPL-MCNC: 1 MG/DL (ref 0.3–1.2)
BNP SERPL-MCNC: 397 PG/ML
BUN SERPL-MCNC: 19 MG/DL (ref 6–20)
CALCIUM SERPL-MCNC: 9.6 MG/DL (ref 8.6–10.4)
CHLORIDE SERPL-SCNC: 102 MMOL/L (ref 98–107)
CO2 SERPL-SCNC: 24 MMOL/L (ref 20–31)
CREAT SERPL-MCNC: 1.26 MG/DL (ref 0.7–1.2)
EOSINOPHILS RELATIVE PERCENT: 0 % (ref 1–4)
GFR SERPL CREATININE-BSD FRML MDRD: >60 ML/MIN/1.73M2
GLUCOSE SERPL-MCNC: 186 MG/DL (ref 70–99)
HCT VFR BLD AUTO: 35 % (ref 40.7–50.3)
HGB BLD-MCNC: 12.3 G/DL (ref 13–17)
IMMATURE GRANULOCYTES: 0 %
LIPASE SERPL-CCNC: 20 U/L (ref 13–60)
LYMPHOCYTES # BLD: 17 % (ref 24–43)
MCH RBC QN AUTO: 27.3 PG (ref 25.2–33.5)
MCHC RBC AUTO-ENTMCNC: 35.1 G/DL (ref 28.4–34.8)
MCV RBC AUTO: 77.8 FL (ref 82.6–102.9)
MONOCYTES # BLD: 12 % (ref 3–12)
NRBC AUTOMATED: 0 PER 100 WBC
PDW BLD-RTO: 13.1 % (ref 11.8–14.4)
PLATELET # BLD AUTO: 298 K/UL (ref 138–453)
PMV BLD AUTO: 9.7 FL (ref 8.1–13.5)
POTASSIUM SERPL-SCNC: 4.4 MMOL/L (ref 3.7–5.3)
PROT SERPL-MCNC: 7.2 G/DL (ref 6.4–8.3)
RBC # BLD: 4.5 M/UL (ref 4.21–5.77)
RBC # BLD: ABNORMAL 10*6/UL
SEG NEUTROPHILS: 71 % (ref 36–65)
SEGMENTED NEUTROPHILS ABSOLUTE COUNT: 6.32 K/UL (ref 1.5–8.1)
SODIUM SERPL-SCNC: 136 MMOL/L (ref 135–144)
TROPONIN I SERPL DL<=0.01 NG/ML-MCNC: 9 NG/L (ref 0–22)
TROPONIN I SERPL DL<=0.01 NG/ML-MCNC: 9 NG/L (ref 0–22)
WBC # BLD AUTO: 9 K/UL (ref 3.5–11.3)

## 2023-04-16 PROCEDURE — 83880 ASSAY OF NATRIURETIC PEPTIDE: CPT

## 2023-04-16 PROCEDURE — 93005 ELECTROCARDIOGRAM TRACING: CPT

## 2023-04-16 PROCEDURE — 85025 COMPLETE CBC W/AUTO DIFF WBC: CPT

## 2023-04-16 PROCEDURE — 71045 X-RAY EXAM CHEST 1 VIEW: CPT

## 2023-04-16 PROCEDURE — 84484 ASSAY OF TROPONIN QUANT: CPT

## 2023-04-16 PROCEDURE — 99285 EMERGENCY DEPT VISIT HI MDM: CPT

## 2023-04-16 PROCEDURE — 80053 COMPREHEN METABOLIC PANEL: CPT

## 2023-04-16 PROCEDURE — 2580000003 HC RX 258

## 2023-04-16 PROCEDURE — 6370000000 HC RX 637 (ALT 250 FOR IP)

## 2023-04-16 PROCEDURE — 83690 ASSAY OF LIPASE: CPT

## 2023-04-16 RX ORDER — FAMOTIDINE 20 MG/1
20 TABLET, FILM COATED ORAL ONCE
Status: COMPLETED | OUTPATIENT
Start: 2023-04-16 | End: 2023-04-16

## 2023-04-16 RX ORDER — MAGNESIUM HYDROXIDE/ALUMINUM HYDROXICE/SIMETHICONE 120; 1200; 1200 MG/30ML; MG/30ML; MG/30ML
30 SUSPENSION ORAL
Status: COMPLETED | OUTPATIENT
Start: 2023-04-16 | End: 2023-04-16

## 2023-04-16 RX ORDER — IBUPROFEN 400 MG/1
400 TABLET ORAL ONCE
Status: COMPLETED | OUTPATIENT
Start: 2023-04-16 | End: 2023-04-16

## 2023-04-16 RX ORDER — MAG/ALUMINUM/SOD BICARB/ALGINC 20 MG-80MG
1 TABLET,CHEWABLE ORAL DAILY
Qty: 30 TABLET | Refills: 0 | Status: SHIPPED | OUTPATIENT
Start: 2023-04-16 | End: 2023-05-16

## 2023-04-16 RX ORDER — 0.9 % SODIUM CHLORIDE 0.9 %
1000 INTRAVENOUS SOLUTION INTRAVENOUS ONCE
Status: COMPLETED | OUTPATIENT
Start: 2023-04-16 | End: 2023-04-16

## 2023-04-16 RX ORDER — FAMOTIDINE 20 MG
20 TABLET ORAL 2 TIMES DAILY
Qty: 60 TABLET | Refills: 0 | Status: SHIPPED | OUTPATIENT
Start: 2023-04-16

## 2023-04-16 RX ADMIN — ALUMINUM HYDROXIDE, MAGNESIUM HYDROXIDE, AND SIMETHICONE 30 ML: 200; 200; 20 SUSPENSION ORAL at 11:35

## 2023-04-16 RX ADMIN — FAMOTIDINE 20 MG: 20 TABLET, FILM COATED ORAL at 11:35

## 2023-04-16 RX ADMIN — SODIUM CHLORIDE 1000 ML: 9 INJECTION, SOLUTION INTRAVENOUS at 11:36

## 2023-04-16 RX ADMIN — IBUPROFEN 400 MG: 400 TABLET, FILM COATED ORAL at 11:35

## 2023-04-16 ASSESSMENT — PAIN DESCRIPTION - ORIENTATION: ORIENTATION: MID

## 2023-04-16 ASSESSMENT — PAIN - FUNCTIONAL ASSESSMENT: PAIN_FUNCTIONAL_ASSESSMENT: 0-10

## 2023-04-16 ASSESSMENT — PAIN DESCRIPTION - FREQUENCY: FREQUENCY: CONTINUOUS

## 2023-04-16 ASSESSMENT — PAIN DESCRIPTION - DESCRIPTORS: DESCRIPTORS: POUNDING

## 2023-04-16 ASSESSMENT — ENCOUNTER SYMPTOMS
CONSTIPATION: 0
ABDOMINAL PAIN: 1
DIARRHEA: 0
NAUSEA: 0
SHORTNESS OF BREATH: 0
COLOR CHANGE: 0
VOMITING: 0

## 2023-04-16 ASSESSMENT — PAIN DESCRIPTION - LOCATION: LOCATION: CHEST

## 2023-04-16 ASSESSMENT — PAIN SCALES - GENERAL: PAINLEVEL_OUTOF10: 10

## 2023-04-19 LAB
EKG ATRIAL RATE: 103 BPM
EKG P AXIS: 61 DEGREES
EKG P-R INTERVAL: 144 MS
EKG Q-T INTERVAL: 330 MS
EKG QRS DURATION: 78 MS
EKG QTC CALCULATION (BAZETT): 432 MS
EKG R AXIS: 71 DEGREES
EKG T AXIS: 34 DEGREES
EKG VENTRICULAR RATE: 103 BPM

## 2023-07-20 ENCOUNTER — OFFICE VISIT (OUTPATIENT)
Dept: FAMILY MEDICINE CLINIC | Age: 58
End: 2023-07-20

## 2023-07-20 VITALS
HEART RATE: 97 BPM | BODY MASS INDEX: 34.89 KG/M2 | WEIGHT: 209.4 LBS | HEIGHT: 65 IN | SYSTOLIC BLOOD PRESSURE: 110 MMHG | DIASTOLIC BLOOD PRESSURE: 69 MMHG

## 2023-07-20 DIAGNOSIS — M51.36 DDD (DEGENERATIVE DISC DISEASE), LUMBAR: Primary | ICD-10-CM

## 2023-07-20 DIAGNOSIS — R22.2 LOCALIZED SWELLING OF CHEST WALL: ICD-10-CM

## 2023-07-20 DIAGNOSIS — R79.89 ELEVATED SERUM CREATININE: ICD-10-CM

## 2023-07-20 DIAGNOSIS — D64.9 ANEMIA, UNSPECIFIED TYPE: ICD-10-CM

## 2023-07-20 RX ORDER — MELOXICAM 15 MG/1
15 TABLET ORAL DAILY
COMMUNITY
Start: 2023-06-28

## 2023-07-20 RX ORDER — PANTOPRAZOLE SODIUM 40 MG/1
40 TABLET, DELAYED RELEASE ORAL DAILY
COMMUNITY
Start: 2023-06-28

## 2023-07-20 SDOH — ECONOMIC STABILITY: INCOME INSECURITY: HOW HARD IS IT FOR YOU TO PAY FOR THE VERY BASICS LIKE FOOD, HOUSING, MEDICAL CARE, AND HEATING?: NOT HARD AT ALL

## 2023-07-20 SDOH — ECONOMIC STABILITY: FOOD INSECURITY: WITHIN THE PAST 12 MONTHS, YOU WORRIED THAT YOUR FOOD WOULD RUN OUT BEFORE YOU GOT MONEY TO BUY MORE.: NEVER TRUE

## 2023-07-20 SDOH — ECONOMIC STABILITY: HOUSING INSECURITY
IN THE LAST 12 MONTHS, WAS THERE A TIME WHEN YOU DID NOT HAVE A STEADY PLACE TO SLEEP OR SLEPT IN A SHELTER (INCLUDING NOW)?: YES

## 2023-07-20 SDOH — ECONOMIC STABILITY: FOOD INSECURITY: WITHIN THE PAST 12 MONTHS, THE FOOD YOU BOUGHT JUST DIDN'T LAST AND YOU DIDN'T HAVE MONEY TO GET MORE.: NEVER TRUE

## 2023-07-20 ASSESSMENT — PATIENT HEALTH QUESTIONNAIRE - PHQ9
SUM OF ALL RESPONSES TO PHQ QUESTIONS 1-9: 6
4. FEELING TIRED OR HAVING LITTLE ENERGY: 3
5. POOR APPETITE OR OVEREATING: 0
8. MOVING OR SPEAKING SO SLOWLY THAT OTHER PEOPLE COULD HAVE NOTICED. OR THE OPPOSITE, BEING SO FIGETY OR RESTLESS THAT YOU HAVE BEEN MOVING AROUND A LOT MORE THAN USUAL: 0
7. TROUBLE CONCENTRATING ON THINGS, SUCH AS READING THE NEWSPAPER OR WATCHING TELEVISION: 0
2. FEELING DOWN, DEPRESSED OR HOPELESS: 0
3. TROUBLE FALLING OR STAYING ASLEEP: 3
SUM OF ALL RESPONSES TO PHQ9 QUESTIONS 1 & 2: 0
10. IF YOU CHECKED OFF ANY PROBLEMS, HOW DIFFICULT HAVE THESE PROBLEMS MADE IT FOR YOU TO DO YOUR WORK, TAKE CARE OF THINGS AT HOME, OR GET ALONG WITH OTHER PEOPLE: 1
9. THOUGHTS THAT YOU WOULD BE BETTER OFF DEAD, OR OF HURTING YOURSELF: 0
1. LITTLE INTEREST OR PLEASURE IN DOING THINGS: 0
SUM OF ALL RESPONSES TO PHQ QUESTIONS 1-9: 6
6. FEELING BAD ABOUT YOURSELF - OR THAT YOU ARE A FAILURE OR HAVE LET YOURSELF OR YOUR FAMILY DOWN: 0

## 2023-07-20 ASSESSMENT — ENCOUNTER SYMPTOMS
SHORTNESS OF BREATH: 0
NAUSEA: 0
COUGH: 0
WHEEZING: 0
BACK PAIN: 1
DIARRHEA: 0
CONSTIPATION: 0
VOMITING: 0
ABDOMINAL PAIN: 0

## 2023-07-20 NOTE — PATIENT INSTRUCTIONS
Thank you for letting us take care of you today. We hope all your questions were addressed. If a question was overlooked or something else comes to mind after you return home, please contact a member of your Care Team listed below. Your Care Team at [address] [place] [address] is Team #2  Bill Ma M.D. (Faculty)  Randy Gandhi, (Resident)  Rasheed Stratton, (Resident)  Kailash Chiu, (Resident)  Vick Wagoner, (Resident)  Anisha Merino, (Resident)  [facility], [address] [place] [address], [facility]  Leo Pappas,  [facility]  Janneth Ying, [facility]  Wilton Roe, [facility]  Annabelle Waterman, [facility]  Amber Joy) [place], [place] ([facility])  Kelly Vogel, [facility] (Clinical Pharmacist)     Office phone number: [phone]    If you need to get in right away due to illness, please be advised we have \"Same Day\" appointments available Monday-Friday. Please call us at [phone] option #3 to schedule your \"Same Day\" appointment.

## 2023-07-20 NOTE — PROGRESS NOTES
Mamta Rudd (:  1965) is a 62 y.o. male,New patient, here for evaluation of the following chief complaint(s):  New Patient (Est. Care), Back Pain (Lower back - used to go to pain management years ago - injury over 20 years ago - 8/10 pain today), Other (Patient feels lump in center of chest - ), and Referral - General (Needing referral to GI for colonoscopy - not to Dr. Ariana Maya  - Also needing sleep study for sleep apnea)         ASSESSMENT/PLAN:  1. DDD (degenerative disc disease), lumbar  -     Allen Machado MD, Pain Management, [facility]  -Patient has elevated creatinine. Was advised to avoid NSAIDs. We will repeat BMP  2. Localized swelling of chest wall  -Likely benign soft tissue swelling. Will continue to monitor and follow-up in 1 month. Will consider ultrasound or chest x-ray if the swelling persist or gets bigger  3. Elevated serum creatinine  -     Basic Metabolic Panel; Future  4. Anemia, unspecified type  -     CBC with Auto Differential; Future      Return in about 29 days (around 2023). Subjective   SUBJECTIVE/OBJECTIVE:  62years old male patient with past medical history of schizoaffective disorder, depression, DDD came to the office complaining of chronic back pain that is going for years. Patient has history of DDD, used to follow-up with pain management. Patient said that the pain is getting worse. In the past, he tried epidural injections, pain medications, along with nerve block and opioids. Patient that only the opioids helped. Pain is localized to the lumbosacral spine does not radiate. Patient also reported some tingling of the bilateral lower limbs without weakness. Denies any bowel or urinary incontinence. Patient is also complaining of having small mass over the lower edge of the sternum. Its firm, not mobile, not painful swelling at the end of the sternum. Sometimes he has pain with deep breath.   Denies any fever, chills, weight loss,
Visit Information    Have you changed or started any medications since your last visit including any over-the-counter medicines, vitamins, or herbal medicines? no   Have you stopped taking any of your medications? Is so, why? -  no  Are you having any side effects from any of your medications? - no    Have you seen any other physician or provider since your last visit?  no   Have you had any other diagnostic tests since your last visit? yes - XR, Labs, EKG   Have you been seen in the emergency room and/or had an admission in a hospital since we last saw you?  yes - [facility]   Have you had your routine dental cleaning in the past 6 months?  no     Do you have an active MyChart account? If no, what is the barrier?   No: Inactive    Patient Care Team:  Vipul Jacques MD as PCP - General (Family Medicine)  KRISTINA Soriano CNP as Nurse Practitioner (Pain Management)    Medical History Review  Past Medical, Family, and Social History reviewed and does not contribute to the patient presenting condition    Health Maintenance   Topic Date Due    COVID-19 Vaccine (1) Never done    Depression Monitoring  Never done    Hepatitis B vaccine (1 of 3 - Risk 3-dose series) Never done    Colorectal Cancer Screen  Never done    Shingles vaccine (1 of 2) Never done    Low dose CT lung screening &/or counseling  Never done    Diabetic retinal exam  08/30/2016    Pneumococcal 0-64 years Vaccine (2 - PCV) 08/22/2019    Diabetic foot exam  09/10/2019    A1C test (Diabetic or Prediabetic)  04/12/2023    Diabetic Alb to Cr ratio (uACR) test  04/12/2023    Lipids  04/12/2023    Flu vaccine (1) 08/01/2023    GFR test (Diabetes, CKD 3-4, OR last GFR 15-59)  04/16/2024    DTaP/Tdap/Td vaccine (2 - Td or Tdap) 02/15/2026    Hepatitis C screen  Completed    HIV screen  Completed    Hepatitis A vaccine  Aged Out    Hib vaccine  Aged Out    Meningococcal (ACWY) vaccine  Aged Out    Prostate Specific Antigen (PSA) Screening or
documented by the resident. Recommendations: Agree with resident assessment and plan. Return in about 29 days (around 8/18/2023).    (Dianne Smiley ) Dr. Marely Morris MD

## 2023-08-02 ENCOUNTER — TELEPHONE (OUTPATIENT)
Dept: FAMILY MEDICINE CLINIC | Age: 58
End: 2023-08-02

## 2023-09-05 ENCOUNTER — TELEPHONE (OUTPATIENT)
Dept: PAIN MANAGEMENT | Age: 58
End: 2023-09-05

## 2023-09-05 NOTE — TELEPHONE ENCOUNTER
Patient calls on 09/05/23 at 7:13a to cancel appointment scheduled on 09/05/23 at [facility] due to having another doctor appointment. Writer instructs patient to contact office during regular business hours. Please contact patient at [phone] to reschedule or to further discuss.

## 2023-12-22 ENCOUNTER — HOSPITAL ENCOUNTER (EMERGENCY)
Age: 58
Discharge: HOME | End: 2023-12-22
Attending: EMERGENCY MEDICINE
Payer: COMMERCIAL

## 2023-12-22 ENCOUNTER — APPOINTMENT (OUTPATIENT)
Dept: GENERAL RADIOLOGY | Age: 58
End: 2023-12-22
Payer: COMMERCIAL

## 2023-12-22 VITALS
OXYGEN SATURATION: 98 % | RESPIRATION RATE: 18 BRPM | BODY MASS INDEX: 34.27 KG/M2 | SYSTOLIC BLOOD PRESSURE: 124 MMHG | WEIGHT: 205.91 LBS | DIASTOLIC BLOOD PRESSURE: 85 MMHG | TEMPERATURE: 97.5 F | HEART RATE: 89 BPM

## 2023-12-22 DIAGNOSIS — R22.2 CHEST WALL MASS: ICD-10-CM

## 2023-12-22 DIAGNOSIS — M79.644 PAIN OF FINGER OF RIGHT HAND: Primary | ICD-10-CM

## 2023-12-22 PROCEDURE — 6370000000 HC RX 637 (ALT 250 FOR IP): Performed by: HEALTH CARE PROVIDER

## 2023-12-22 PROCEDURE — 73130 X-RAY EXAM OF HAND: CPT

## 2023-12-22 PROCEDURE — 71046 X-RAY EXAM CHEST 2 VIEWS: CPT

## 2023-12-22 PROCEDURE — 99285 EMERGENCY DEPT VISIT HI MDM: CPT

## 2023-12-22 RX ORDER — IBUPROFEN 800 MG/1
800 TABLET ORAL ONCE
Status: COMPLETED | OUTPATIENT
Start: 2023-12-22 | End: 2023-12-22

## 2023-12-22 RX ORDER — NAPROXEN 500 MG/1
500 TABLET ORAL 2 TIMES DAILY WITH MEALS
Qty: 28 TABLET | Refills: 0 | Status: SHIPPED | OUTPATIENT
Start: 2023-12-22 | End: 2024-01-05

## 2023-12-22 RX ADMIN — IBUPROFEN 800 MG: 800 TABLET, FILM COATED ORAL at 08:20
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
## 2023-12-22 NOTE — DISCHARGE INSTRUCTIONS
Call today or tomorrow to follow up with Steve Gibbons MD  in 2-3 days. As discussed, follow-up with your primary care provider and discuss the need for any further advanced imaging or testing needed. Use an ice pack or bag filled with ice and apply to the injured area 3 - 4 times a day for 15 - 20 minutes each time. You can use the Naprosyn as prescribed, or over-the-counter Tylenol. Do not take this medication if you start taking the Mobic again as well. Return to the Emergency Department for worsening of pain, swelling to wrist, inability to your fingers, any other care or concern.

## 2023-12-22 NOTE — DISCHARGE INSTR - COC
Continuity of Care Form    Patient Name: Zac Chacon   :  1965  MRN:  [id number]    [address] date:  2023  Discharge date:  ***    Code Status Order: Prior   Advance Directives:     Admitting Physician:  No admitting provider for patient encounter. PCP: Sarah La MD    Discharging Nurse: [facility] Unit/Room#:   Discharging Unit Phone Number: ***    Emergency Contact:   Extended Emergency Contact Information  Primary Emergency Contact: Kim Nair  Address: N/A   [place] [address] [address] Phone: [phone]  Relation: Brother/Sister    Past Surgical History:  Past Surgical History:   Procedure Laterality Date    COLONOSCOPY      TONSILLECTOMY AND ADENOIDECTOMY         Immunization History:   Immunization History   Administered Date(s) Administered    Fluvirin 4 years and over 2015    Influenza, AFLURIA (age 1 yrs+), FLUZONE, (age 10 mo+), MDV, 0.5mL 2016    Influenza, FLUARIX, FLULAVAL, FLUZONE (age 10 mo+) AND AFLURIA, (age 1 y+), PF, 0.5mL 2018    Pneumococcal, PPSV23, PNEUMOVAX 21, (age 2y+), SC/IM, 0.5mL 2012, 2018    TDaP, ADACEL (age 6y-58y), BOOSTRIX (age 10y+), IM, 0.5mL 02/15/2016       Active Problems:  Patient Active Problem List   Diagnosis Code    GERD (gastroesophageal reflux disease) K21.9    Back pain M54.9    Sleep apnea, obstructive G47.33    Dyslipidemia E78.5    DM2 (diabetes mellitus, type 2) ([address]) E11.9    Smoking F17.200    Schizoaffective disorder, bipolar type ([address]) F25.0    Schizoaffective disorder, depressive type ([address]) F25.1    Depression, major, recurrent, severe with psychosis ([address]) F33.3    Schizophrenia ([address]) F20.9    MDD (major depressive disorder), recurrent episode ([address]) F33.9    Depression with suicidal ideation F32. A, R45.851       Isolation/Infection:   Isolation            No Isolation          Patient Infection Status       None to display                     Nurse Assessment:  Last Vital Signs: BP belongings (please select all that are sent with patient):  {CHP DME Belongings:046967494}    RN SIGNATURE:  {Esignature:[id number]}    CASE MANAGEMENT/SOCIAL WORK SECTION    Inpatient Status Date: ***    Readmission Risk Assessment Score:  Readmission Risk              Risk of Unplanned Readmission:  0           Discharging to Facility/ Agency   Name:   Address:  Phone:  Fax:    Dialysis Facility (if applicable)   Name:  Address:  Dialysis Schedule:  Phone:  Fax:    / signature: {Esignature:[id number]}    PHYSICIAN SECTION    Prognosis: {Prognosis:8464786724}    Condition at Discharge: [address] Patient Condition:539439805}    Rehab Potential (if transferring to Rehab): {Prognosis:5723686102}    Recommended Labs or Other Treatments After Discharge: ***    Physician Certification: I certify the above information and transfer of Gabriela Rodriguez  is necessary for the continuing treatment of the diagnosis listed and that he requires {Admit to Appropriate Level of Care:95534} for {GREATER/LESS:184303898} 30 days.      Update Admission H&P: {CHP DME Changes in [facility]}    PHYSICIAN SIGNATURE:  {Esignature:[id number]}
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
## 2024-03-22 ENCOUNTER — TELEPHONE (OUTPATIENT)
Dept: GASTROENTEROLOGY | Age: 59
End: 2024-03-22

## 2024-03-22 NOTE — TELEPHONE ENCOUNTER
NP/GERD without esophagitis/Colon cancer screening/[facility]  1st attempt- Called pt and LVM to call the office to soto an appt.  2nd attempt- Sent NP letter

## 2024-03-27 ENCOUNTER — TELEPHONE (OUTPATIENT)
Dept: GASTROENTEROLOGY | Age: 59
End: 2024-03-27

## 2024-03-27 NOTE — TELEPHONE ENCOUNTER
Writer tried to call and make and attempt to get a hold of him to see if we can set an n/p appt from his referral,  3rd atttempt

## 2024-08-07 ENCOUNTER — TELEPHONE (OUTPATIENT)
Dept: ADMINISTRATIVE | Age: 59
End: 2024-08-07

## 2024-08-07 NOTE — TELEPHONE ENCOUNTER
Patient called with a referral to get a colonoscopy with dr hernandez    Please contact to schedule

## 2024-08-21 ENCOUNTER — HOSPITAL ENCOUNTER (EMERGENCY)
Age: 59
Discharge: HOME | End: 2024-08-22
Attending: EMERGENCY MEDICINE
Payer: COMMERCIAL

## 2024-08-21 ENCOUNTER — APPOINTMENT (OUTPATIENT)
Dept: GENERAL RADIOLOGY | Age: 59
End: 2024-08-21
Payer: COMMERCIAL

## 2024-08-21 DIAGNOSIS — M79.605 LEFT LEG PAIN: Primary | ICD-10-CM

## 2024-08-21 LAB
D DIMER PPP FEU-MCNC: <0.27 UG/ML FEU (ref 0–0.57)
GLUCOSE BLD-MCNC: 274 MG/DL (ref 75–110)

## 2024-08-21 PROCEDURE — 85379 FIBRIN DEGRADATION QUANT: CPT

## 2024-08-21 PROCEDURE — 73590 X-RAY EXAM OF LOWER LEG: CPT

## 2024-08-21 PROCEDURE — 99284 EMERGENCY DEPT VISIT MOD MDM: CPT

## 2024-08-21 PROCEDURE — 6370000000 HC RX 637 (ALT 250 FOR IP)

## 2024-08-21 PROCEDURE — 82947 ASSAY GLUCOSE BLOOD QUANT: CPT

## 2024-08-21 RX ORDER — ACETAMINOPHEN 500 MG
1000 TABLET ORAL ONCE
Status: COMPLETED | OUTPATIENT
Start: 2024-08-21 | End: 2024-08-21

## 2024-08-21 RX ADMIN — ACETAMINOPHEN 1000 MG: 500 TABLET ORAL at 23:10

## 2024-08-21 ASSESSMENT — PAIN - FUNCTIONAL ASSESSMENT: PAIN_FUNCTIONAL_ASSESSMENT: 0-10

## 2024-08-21 ASSESSMENT — PAIN SCALES - GENERAL: PAINLEVEL_OUTOF10: 9

## 2024-08-22 ENCOUNTER — APPOINTMENT (OUTPATIENT)
Dept: GENERAL RADIOLOGY | Age: 59
End: 2024-08-22
Payer: COMMERCIAL

## 2024-08-22 ENCOUNTER — APPOINTMENT (OUTPATIENT)
Dept: VASCULAR LAB | Age: 59
End: 2024-08-22
Payer: COMMERCIAL

## 2024-08-22 VITALS
TEMPERATURE: 97.2 F | OXYGEN SATURATION: 98 % | DIASTOLIC BLOOD PRESSURE: 82 MMHG | RESPIRATION RATE: 16 BRPM | HEART RATE: 95 BPM | SYSTOLIC BLOOD PRESSURE: 142 MMHG

## 2024-08-22 VITALS
RESPIRATION RATE: 16 BRPM | SYSTOLIC BLOOD PRESSURE: 144 MMHG | TEMPERATURE: 98.2 F | HEART RATE: 86 BPM | OXYGEN SATURATION: 100 % | WEIGHT: 209 LBS | BODY MASS INDEX: 34.82 KG/M2 | HEIGHT: 65 IN | DIASTOLIC BLOOD PRESSURE: 98 MMHG

## 2024-08-22 DIAGNOSIS — R73.9 HYPERGLYCEMIA: ICD-10-CM

## 2024-08-22 DIAGNOSIS — L03.119 CELLULITIS OF FOOT: Primary | ICD-10-CM

## 2024-08-22 LAB
ALBUMIN SERPL-MCNC: 3.5 G/DL (ref 3.5–5.2)
ALP SERPL-CCNC: 112 U/L (ref 40–129)
ALT SERPL-CCNC: 14 U/L (ref 5–41)
ANION GAP SERPL CALCULATED.3IONS-SCNC: 6 MMOL/L (ref 9–16)
ANION GAP SERPL CALCULATED.3IONS-SCNC: 8 MMOL/L (ref 9–17)
AST SERPL-CCNC: 15 U/L
BASOPHILS # BLD: 0 K/UL (ref 0–0.2)
BASOPHILS # BLD: 0.03 K/UL (ref 0–0.2)
BASOPHILS NFR BLD: 0 % (ref 0–2)
BASOPHILS NFR BLD: 1 % (ref 0–2)
BILIRUB SERPL-MCNC: 0.3 MG/DL (ref 0.3–1.2)
BUN SERPL-MCNC: 18 MG/DL (ref 6–20)
BUN SERPL-MCNC: 20 MG/DL (ref 6–20)
CALCIUM SERPL-MCNC: 8.7 MG/DL (ref 8.6–10.4)
CALCIUM SERPL-MCNC: 8.9 MG/DL (ref 8.6–10.4)
CHLORIDE SERPL-SCNC: 106 MMOL/L (ref 98–107)
CHLORIDE SERPL-SCNC: 107 MMOL/L (ref 98–107)
CO2 SERPL-SCNC: 25 MMOL/L (ref 20–31)
CO2 SERPL-SCNC: 25 MMOL/L (ref 20–31)
CREAT SERPL-MCNC: 1.1 MG/DL (ref 0.7–1.2)
CREAT SERPL-MCNC: 1.3 MG/DL (ref 0.7–1.2)
EOSINOPHIL # BLD: 0.3 K/UL (ref 0–0.4)
EOSINOPHIL # BLD: 0.44 K/UL (ref 0–0.44)
EOSINOPHILS RELATIVE PERCENT: 5 % (ref 0–4)
EOSINOPHILS RELATIVE PERCENT: 5 % (ref 1–4)
ERYTHROCYTE [DISTWIDTH] IN BLOOD BY AUTOMATED COUNT: 14.3 % (ref 11.8–14.4)
ERYTHROCYTE [DISTWIDTH] IN BLOOD BY AUTOMATED COUNT: 14.7 % (ref 11.5–14.9)
GFR, ESTIMATED: 66 ML/MIN/1.73M2
GFR, ESTIMATED: 77 ML/MIN/1.73M2
GLUCOSE BLD-MCNC: 133 MG/DL (ref 75–110)
GLUCOSE SERPL-MCNC: 212 MG/DL (ref 74–99)
GLUCOSE SERPL-MCNC: 302 MG/DL (ref 70–99)
HCT VFR BLD AUTO: 38 % (ref 40.7–50.3)
HCT VFR BLD AUTO: 41.9 % (ref 41–53)
HGB BLD-MCNC: 13.6 G/DL (ref 13–17)
HGB BLD-MCNC: 14.2 G/DL (ref 13.5–17.5)
IMM GRANULOCYTES # BLD AUTO: 0.03 K/UL (ref 0–0.3)
IMM GRANULOCYTES NFR BLD: 0 %
LYMPHOCYTES NFR BLD: 1 K/UL (ref 1–4.8)
LYMPHOCYTES NFR BLD: 1.79 K/UL (ref 1.1–3.7)
LYMPHOCYTES RELATIVE PERCENT: 15 % (ref 24–44)
LYMPHOCYTES RELATIVE PERCENT: 20 % (ref 24–43)
MCH RBC QN AUTO: 29.1 PG (ref 25.2–33.5)
MCH RBC QN AUTO: 29.4 PG (ref 26–34)
MCHC RBC AUTO-ENTMCNC: 33.9 G/DL (ref 31–37)
MCHC RBC AUTO-ENTMCNC: 35.8 G/DL (ref 28.4–34.8)
MCV RBC AUTO: 81.4 FL (ref 82.6–102.9)
MCV RBC AUTO: 86.8 FL (ref 80–100)
MONOCYTES NFR BLD: 0.6 K/UL (ref 0.1–1.3)
MONOCYTES NFR BLD: 0.84 K/UL (ref 0.1–1.2)
MONOCYTES NFR BLD: 9 % (ref 1–7)
MONOCYTES NFR BLD: 9 % (ref 3–12)
NEUTROPHILS NFR BLD: 66 % (ref 36–65)
NEUTROPHILS NFR BLD: 70 % (ref 36–66)
NEUTS SEG NFR BLD: 4.5 K/UL (ref 1.3–9.1)
NEUTS SEG NFR BLD: 5.86 K/UL (ref 1.5–8.1)
NRBC BLD-RTO: 0 PER 100 WBC
PLATELET # BLD AUTO: 281 K/UL (ref 138–453)
PLATELET # BLD AUTO: 303 K/UL (ref 150–450)
PMV BLD AUTO: 7.8 FL (ref 6–12)
PMV BLD AUTO: 9.4 FL (ref 8.1–13.5)
POTASSIUM SERPL-SCNC: 3.9 MMOL/L (ref 3.7–5.3)
POTASSIUM SERPL-SCNC: 4.5 MMOL/L (ref 3.7–5.3)
PROT SERPL-MCNC: 5.5 G/DL (ref 6.4–8.3)
RBC # BLD AUTO: 4.67 M/UL (ref 4.21–5.77)
RBC # BLD AUTO: 4.83 M/UL (ref 4.5–5.9)
RBC # BLD: ABNORMAL 10*6/UL
SODIUM SERPL-SCNC: 137 MMOL/L (ref 136–145)
SODIUM SERPL-SCNC: 140 MMOL/L (ref 135–144)
URATE SERPL-MCNC: 5.7 MG/DL (ref 3.4–7)
WBC OTHER # BLD: 6.4 K/UL (ref 3.5–11)
WBC OTHER # BLD: 9 K/UL (ref 3.5–11.3)

## 2024-08-22 PROCEDURE — 73630 X-RAY EXAM OF FOOT: CPT

## 2024-08-22 PROCEDURE — 85025 COMPLETE CBC W/AUTO DIFF WBC: CPT

## 2024-08-22 PROCEDURE — 2580000003 HC RX 258: Performed by: PHYSICIAN ASSISTANT

## 2024-08-22 PROCEDURE — 80053 COMPREHEN METABOLIC PANEL: CPT

## 2024-08-22 PROCEDURE — 96374 THER/PROPH/DIAG INJ IV PUSH: CPT

## 2024-08-22 PROCEDURE — 82947 ASSAY GLUCOSE BLOOD QUANT: CPT

## 2024-08-22 PROCEDURE — 6360000002 HC RX W HCPCS: Performed by: PHYSICIAN ASSISTANT

## 2024-08-22 PROCEDURE — 99284 EMERGENCY DEPT VISIT MOD MDM: CPT

## 2024-08-22 PROCEDURE — 80048 BASIC METABOLIC PNL TOTAL CA: CPT

## 2024-08-22 PROCEDURE — 93971 EXTREMITY STUDY: CPT

## 2024-08-22 PROCEDURE — 6370000000 HC RX 637 (ALT 250 FOR IP): Performed by: PHYSICIAN ASSISTANT

## 2024-08-22 PROCEDURE — 84550 ASSAY OF BLOOD/URIC ACID: CPT

## 2024-08-22 PROCEDURE — 96361 HYDRATE IV INFUSION ADD-ON: CPT

## 2024-08-22 PROCEDURE — 36415 COLL VENOUS BLD VENIPUNCTURE: CPT

## 2024-08-22 RX ORDER — KETOROLAC TROMETHAMINE 30 MG/ML
30 INJECTION, SOLUTION INTRAMUSCULAR; INTRAVENOUS ONCE
Status: COMPLETED | OUTPATIENT
Start: 2024-08-22 | End: 2024-08-22

## 2024-08-22 RX ORDER — DOXYCYCLINE HYCLATE 100 MG
100 TABLET ORAL 2 TIMES DAILY
Qty: 20 TABLET | Refills: 0 | Status: SHIPPED | OUTPATIENT
Start: 2024-08-22 | End: 2024-09-01

## 2024-08-22 RX ORDER — 0.9 % SODIUM CHLORIDE 0.9 %
1000 INTRAVENOUS SOLUTION INTRAVENOUS ONCE
Status: COMPLETED | OUTPATIENT
Start: 2024-08-22 | End: 2024-08-22

## 2024-08-22 RX ORDER — TRAMADOL HYDROCHLORIDE 50 MG/1
50 TABLET ORAL ONCE
Status: COMPLETED | OUTPATIENT
Start: 2024-08-22 | End: 2024-08-22

## 2024-08-22 RX ADMIN — KETOROLAC TROMETHAMINE 30 MG: 30 INJECTION, SOLUTION INTRAMUSCULAR at 13:06

## 2024-08-22 RX ADMIN — TRAMADOL HYDROCHLORIDE 50 MG: 50 TABLET, COATED ORAL at 11:46

## 2024-08-22 RX ADMIN — SODIUM CHLORIDE 1000 ML: 9 INJECTION, SOLUTION INTRAVENOUS at 13:04

## 2024-08-22 ASSESSMENT — ENCOUNTER SYMPTOMS
BACK PAIN: 0
SHORTNESS OF BREATH: 0
NAUSEA: 0
VOMITING: 0
DIARRHEA: 0
ABDOMINAL PAIN: 0
COUGH: 0

## 2024-08-22 ASSESSMENT — PAIN SCALES - GENERAL
PAINLEVEL_OUTOF10: 6
PAINLEVEL_OUTOF10: 7
PAINLEVEL_OUTOF10: 9

## 2024-08-22 ASSESSMENT — LIFESTYLE VARIABLES
HOW OFTEN DO YOU HAVE A DRINK CONTAINING ALCOHOL: MONTHLY OR LESS
HOW MANY STANDARD DRINKS CONTAINING ALCOHOL DO YOU HAVE ON A TYPICAL DAY: 1 OR 2

## 2024-08-22 NOTE — ED PROVIDER NOTES
[facility] ED  Emergency Department  Emergency Medicine Resident Turn-Over     Note Started: 12:19 AM EDT    Care of Jose Manuel Maya was assumed from Dr. Narvaez and is being seen for Leg Pain (Left leg pain)  .  The patient's initial evaluation and plan have been discussed with the prior provider who initially evaluated the patient.     EMERGENCY DEPARTMENT COURSE / MEDICAL DECISION MAKING:       MEDICATIONS GIVEN:  Orders Placed This Encounter   Medications    acetaminophen (TYLENOL) tablet 1,000 mg       LABS / RADIOLOGY:     Labs Reviewed   CBC WITH AUTO DIFFERENTIAL - Abnormal; Notable for the following components:       Result Value    Hematocrit 38.0 (*)     MCV 81.4 (*)     MCHC 35.8 (*)     Neutrophils % 66 (*)     Lymphocytes % 20 (*)     Eosinophils % 5 (*)     All other components within normal limits   BASIC METABOLIC PANEL - Abnormal; Notable for the following components:    Anion Gap 6 (*)     Glucose 212 (*)     Creatinine 1.3 (*)     All other components within normal limits   POC GLUCOSE FINGERSTICK - Abnormal; Notable for the following components:    POC Glucose 274 (*)     All other components within normal limits   D-DIMER, QUANTITATIVE       No results found.    RECENT VITALS:     Temp: 97.2 °F (36.2 °C),  Pulse: 95, Respirations: 16, BP: (!) 142/82, SpO2: 98 %    This patient is a 59 y.o. Male with acute left lower extremity pain, no trauma.  Walking all day today.  Soft compartments, DP bilaterally 2+.  D-dimer negative, unable to ambulate after Tylenol.  Pending CBC, BMP and x-ray.  Discharge if negative.    ED Course as of 08/22/24 0131   Wed Aug 21, 2024   2321 POC Glucose Fingerstick(!):    POC Glucose 274(!) [AS]   2350 D-Dimer, Quantitative:    D-Dimer, Quant <0.27 [AS]   u Aug 22, 2024   0039 CBC with Auto Differential(!):    WBC 9.0   RBC 4.67   Hemoglobin Quant 13.6   Hematocrit 38.0(!)   MCV 81.4(!)   MCH 29.1   MCHC 35.8(!)   RDW 14.3   Platelet Count 281   MPV

## 2024-08-22 NOTE — ED PROVIDER NOTES
eMERGENCY dEPARTMENT eNCOUnter   Independent Attestation     Pt Name: Jose Manuel Maya  MRN: [id number]  Birthdate 1965  Date of evaluation: 8/22/24     Jose Manuel Maya is a 59 y.o. male with CC: Leg Swelling and Foot Pain      Based on the medical record the care appears appropriate.  I was personally available for consultation in the Emergency Department.    Yared Ozuna DO  Attending Emergency Physician                 Yared Ozuna DO  08/22/24 1535    
  BS did go down with fluids.     Suspect cellulitis.  Will start on doxycycline.       Patient repeat assessment:  stable, no distress     Disposition discussion with patient/family, Shared Decision Making:  Discussed results and plan with the pt.  They expressed appropriate understanding.  Pt given close follow up, supportive care instructions and strict return instructions at the bedside.      MIPS:      PROCEDURES:    Procedures      DATA FOR LAB AND RADIOLOGY TESTS ORDERED BELOW ARE REVIEWED BY THE ED CLINICIAN:    RADIOLOGY: All x-rays, CT, MRI, and formal ultrasound images (except ED bedside ultrasound) are read by the radiologist, see reports below, unless otherwise noted in MDM or here.  Reports below are reviewed by myself.  Vascular duplex lower extremity venous left         XR FOOT LEFT (MIN 3 VIEWS)   Final Result   No acute osseous abnormality identified.      Mild soft tissue swelling of the forefoot.      Osteoarthritis.             LABS: Lab orders shown below, the results are reviewed by myself, and all abnormals are listed below.  Labs Reviewed   CBC WITH AUTO DIFFERENTIAL - Abnormal; Notable for the following components:       Result Value    Neutrophils % 70 (*)     Lymphocytes % 15 (*)     Monocytes % 9 (*)     Eosinophils % 5 (*)     All other components within normal limits   COMPREHENSIVE METABOLIC PANEL W/ REFLEX TO MG FOR LOW K - Abnormal; Notable for the following components:    Anion Gap 8 (*)     Glucose 302 (*)     Total Protein 5.5 (*)     All other components within normal limits   POC GLUCOSE FINGERSTICK - Abnormal; Notable for the following components:    POC Glucose 133 (*)     All other components within normal limits   URIC ACID   POCT GLUCOSE       Vitals Reviewed:    Vitals:    08/22/24 1038 08/22/24 1353   BP: (!) 141/84 (!) 144/98   Pulse: 89 86   Resp: 18 16   Temp: 98.2 °F (36.8 °C)    TempSrc: Oral    SpO2: 97% 100%   Weight: 94.8 kg (209 lb)    Height: 1.651 m (5' 5\")

## 2024-08-22 NOTE — ED NOTES
Pt to ED H21B via EMS c/o left leg pain radiating up to his calf. Pt denies any trauma causing this pain. Pt states pain began while walking around 1300 today. Pt denies hx of blood clots, pt states that he is diabetic and has neuropathy that he takes 800mg gabapentin for. Pt states pain is worse with walking. Pt RR is even and non-labored on arrival. Pt vitals complete, resident is at the bedside to assess, plan of care ongoing.

## 2024-08-22 NOTE — ED TRIAGE NOTES
Mode of arrival (squad #, walk in, police, etc) : Walk in (Wheelchair)        Chief complaint(s): Foot swelling        Arrival Note (brief scenario, treatment PTA, etc).: Pt arrived to ED complaining of left foot swelling and pain that started yesterday. The patient complains of pain with movement and at rest. The patient has a history of diabetes, diabetic neuropathy and hypertension. The patient was seen at [facility] yesterday but reports treatment did not help.

## 2024-08-22 NOTE — DISCHARGE INSTRUCTIONS
Call today or tomorrow to follow up with Jodi Lopez MD  in 7 days.    Use an ice pack or bag filled with ice and apply to the injured area 3 - 4 times a day for 15 - 20 minutes each time.    Use ibuprofen or Tylenol (unless prescribed medications that have Tylenol in it) for pain.  You can take over the counter Ibuprofen (advil) tablets (3 every 6 hours)    Use your crutches as needed for pain.    Return to the Emergency Department for worsening of pain, increase swelling to the ankle, inability to move your toes, any other care or concern.

## 2024-08-22 NOTE — ED PROVIDER NOTES
[facility] ED     Emergency Department     Faculty Attestation        I performed a history and physical examination of the patient and discussed management with the resident. I reviewed the resident’s note and agree with the documented findings and plan of care. Any areas of disagreement are noted on the chart. I was personally present for the key portions of any procedures. I have documented in the chart those procedures where I was not present during the key portions. I have reviewed the emergency nurses triage note. I agree with the chief complaint, past medical history, past surgical history, allergies, medications, social and family history as documented unless otherwise noted below.  For Physician Assistant/ Nurse Practitioner cases/documentation I have personally evaluated this patient and have completed at least one if not all key elements of the E/M (history, physical exam, and MDM). Additional findings are as noted.      Vital Signs: BP: (!) 142/82  Pulse: 95  Respirations: 16  Temp: 97.2 °F (36.2 °C) SpO2: 98 %  PCP:  Jodi Lopez MD  Note Started: 8/21/24, 10:25 PM EDT    Pertinent Comments:     Patient is a 59-year-old male with history of hypertension and diabetes.   States has been walking for the last several days significant length and now has some pain in his left calf.   No actual immobility at all and actually has been much more active than normal.   Denies any chest pain or shortness of breath or palpitations no abdominal pain or back pain.   No pain in the thigh at all and is strictly in the left calf.   Calf musculature is supple with no tension.   Does have some tenderness to palpation there but no warmth or erythema or signs of infection.   Strong DP/PT pulses are palpated and equal bilateral with capillary refill brisk and less than 2 seconds as well as distal strength 5/5 and sensation light touch intact.   Please see the attached refill request.     Assessment/Plan: Patient with calf pain likely with calf musculature strain given walking more but concerned about DVT will obtain D-dimer.   Attempt symptomatic control and reevaluate after      Critical Care  None      (Please note that portions of this note were completed with a voice recognition program. Efforts were made to edit the dictations but occasionally words are mis-transcribed. Whenever words are used in this note in any gender, they shall be construed as though they were used in the gender appropriate to the circumstances; and whenever words are used in this note in the singular or plural form, they shall be construed as though they were used in the form appropriate to the circumstances.)    Robin Helms MD [facility]  Attending Emergency Medicine Physician           Robin Helms MD  08/21/24 4839

## 2024-08-22 NOTE — ED PROVIDER NOTES
[facility] ED  Emergency Department Encounter  Emergency Medicine Resident     Pt Name:Jose Manuel Maya  MRN: [id number]  Birthdate 1965  Date of evaluation: 8/22/24  PCP:  Zuleyka Gill APRN - CNP  Note Started: 4:12 PM EDT      CHIEF COMPLAINT       Chief Complaint   Patient presents with    Leg Pain     Left leg pain       HISTORY OF PRESENT ILLNESS  (Location/Symptom, Timing/Onset, Context/Setting, Quality, Duration, Modifying Factors, Severity.)      Jose Manuel Maya is a 59 y.o. male who presents with left leg pain.  Patient denies any trauma.  Patient states that he was walking all day this episode of pain suddenly occurred.  Patient has a past med history of type II diabetes, denies any history of blood clots or anticoagulation therapy.    PAST MEDICAL / SURGICAL / SOCIAL / FAMILY HISTORY      has a past medical history of Acute depression, Back pain, DM2 (diabetes mellitus, type 2) ([facility]), Generalized OA, GERD (gastroesophageal reflux disease), HTN (hypertension), Schizoaffective disorder, bipolar type ([facility]), and Unspecified sleep apnea.       has a past surgical history that includes Tonsillectomy and adenoidectomy and Colonoscopy.      Social History     Socioeconomic History    Marital status: Single     Spouse name: Not on file    Number of children: Not on file    Years of education: Not on file    Highest education level: Not on file   Occupational History    Not on file   Tobacco Use    Smoking status: Every Day     Current packs/day: 0.50     Average packs/day: 0.5 packs/day for 41.0 years (20.5 ttl pk-yrs)     Types: Cigarettes    Smokeless tobacco: Never   Vaping Use    Vaping status: Never Used   Substance and Sexual Activity    Alcohol use: No    Drug use: Yes     Types: Marijuana (Weed), Cocaine     Comment: pt denies +cocaine 12/6/21    Sexual activity: Not on file   Other Topics Concern    Not on file   Social History Narrative    Not on file     Social Determinants  that portions of thisnote were completed with a voice recognition program.  Efforts were made to edit the dictations but occasionally words are mis-transcribed.)

## 2024-08-22 NOTE — DISCHARGE INSTRUCTIONS
Please follow up with your PCP and podiatrist.  Recommend return to the ED if you develop any worsening swelling, redness, pain, fever, chills, vomiting or any other concerning symptoms.  Please take your medications as prescribed and check you blood sugar at home.

## 2024-08-22 NOTE — ED PROVIDER NOTES
Faculty Sign-Out Attestation  Handoff taken on the following patient from prior Attending Physician: Orqvist  Note Started: 11:08 PM EDT    I was available and discussed any additional care issues that arose and coordinated the management plans with the resident(s) caring for the patient during my duty period. Any areas of disagreement with resident’s documentation of care or procedures are noted on the chart. I was personally present for the key portions of any/all procedures during my duty period. I have documented in the chart those procedures where I was not present during the key portions.    Calf strain, compartment soft,   D dimer pending, (+/- > lovenox)    D dimer -,   // xr -, stable, no indication of injury / pulse palpable     Jet Meyer DO  Attending Physician       Jet Meyer DO  08/21/24 2308       Jet Meyer DO  08/22/24 0003       Jet Meyer DO  08/22/24 0122       Jet Meyer DO  08/22/24 0624

## 2024-08-23 LAB — ECHO BSA: 2.08 M2

## 2024-08-24 NOTE — DISCHARGE INSTRUCTIONS
[facility] WOUND and HYPERBARIC TREATMENT  CENTER      Visit  Discharge Instructions / Physician Orders  DATE:8/26/24     Home Care:NONE     SUPPLIES ORDERED THRU:  NONE                 DATE LAST SUPPLIED     Wound Location:  Left foot     Cleanse with: Liquid antibacterial soap and water, rinse well      Dressing Orders:  Primary dressing  Silvercel tuck between toes    Secondary dressing   Cover with dry gauze        Frequency:  Daily     Additional Orders: Increase protein to diet (meat, cheese, eggs, fish, peanut butter, nuts and beans)  Multivitamin daily    OFFLOADING [x] YES  TYPE:                  [] NA  No pressure to area  Weekly wound care visits until determined otherwise.    Antibiotic therapy-wound care related YES [x] NO [] NA[]  Doxycycline 100 mg 2 x per day through 9/1/24  Please finish up  MY CHART []     Smart Device  []     HYPERBARIC TREATMENT-                TREATMENT #                          Your next appointment with the Wound Care Center is in 1 week Wednesday-or Thursday                                                                                                   (Please note your next appointment above and if you are unable to keep, kindly give a 24 hour notice. Thank you.)  If more than 15 min late we cannot guarantee you will be seen due to clinician schedule  Per Policy, Excessive cancellation will call for dismissal from program.  If you experience any of the following, please call the Wound Care Center during business hours:  [phone]     * Increase in Pain  * Temperature over 101  * Increase in drainage from your wound  * Drainage with a foul odor  * Bleeding  * Increase in swelling  * Need for compression bandage changes due to slippage, breakthrough drainage.     If you need medical attention outside of the business hours of the Wound Care Centers please contact your PCP or go to the nearest emergency room.     The information contained in the After Visit Summary has  been reviewed with me, the patient and/or responsible adult, by my health care provider(s). I had the opportunity to ask questions regarding this information. I have elected to receive;      []After Visit Summary  [x]Comprehensive Discharge Instruction      Patient signature______________________________________Date:________  Electronically signed by Camille Campos RN on 8/26/2024 at 8:56 AM   Electronically signed by KRISTINA QUINONES - CNP on 8/26/2024 at 9:23 AM

## 2024-08-26 ENCOUNTER — HOSPITAL ENCOUNTER (OUTPATIENT)
Dept: WOUND CARE | Age: 59
Discharge: HOME | End: 2024-08-26
Payer: COMMERCIAL

## 2024-08-26 VITALS
TEMPERATURE: 97.4 F | SYSTOLIC BLOOD PRESSURE: 116 MMHG | DIASTOLIC BLOOD PRESSURE: 74 MMHG | HEIGHT: 65 IN | RESPIRATION RATE: 20 BRPM | BODY MASS INDEX: 34.82 KG/M2 | WEIGHT: 209 LBS | HEART RATE: 92 BPM

## 2024-08-26 DIAGNOSIS — L97.522 ULCER OF LEFT FOOT WITH FAT LAYER EXPOSED (HCC): Primary | ICD-10-CM

## 2024-08-26 DIAGNOSIS — E11.621 TYPE 2 DIABETES MELLITUS WITH FOOT ULCER, WITHOUT LONG-TERM CURRENT USE OF INSULIN (HCC): ICD-10-CM

## 2024-08-26 DIAGNOSIS — F17.200 SMOKING: ICD-10-CM

## 2024-08-26 DIAGNOSIS — L97.509 TYPE 2 DIABETES MELLITUS WITH FOOT ULCER, WITHOUT LONG-TERM CURRENT USE OF INSULIN (HCC): ICD-10-CM

## 2024-08-26 PROCEDURE — 99203 OFFICE O/P NEW LOW 30 MIN: CPT | Performed by: NURSE PRACTITIONER

## 2024-08-26 PROCEDURE — 11042 DBRDMT SUBQ TIS 1ST 20SQCM/<: CPT

## 2024-08-26 PROCEDURE — 11042 DBRDMT SUBQ TIS 1ST 20SQCM/<: CPT | Performed by: NURSE PRACTITIONER

## 2024-08-26 PROCEDURE — 99213 OFFICE O/P EST LOW 20 MIN: CPT

## 2024-08-26 RX ORDER — LIDOCAINE HYDROCHLORIDE 20 MG/ML
JELLY TOPICAL ONCE
OUTPATIENT
Start: 2024-08-26 | End: 2024-08-26

## 2024-08-26 RX ORDER — LIDOCAINE HYDROCHLORIDE 40 MG/ML
SOLUTION TOPICAL ONCE
Status: DISCONTINUED | OUTPATIENT
Start: 2024-08-26 | End: 2024-08-27 | Stop reason: HOSPADM

## 2024-08-26 RX ORDER — LIDOCAINE HYDROCHLORIDE 40 MG/ML
SOLUTION TOPICAL ONCE
OUTPATIENT
Start: 2024-08-26 | End: 2024-08-26

## 2024-08-26 ASSESSMENT — PAIN DESCRIPTION - LOCATION: LOCATION: FOOT

## 2024-08-26 ASSESSMENT — PAIN SCALES - GENERAL: PAINLEVEL_OUTOF10: 8

## 2024-08-26 ASSESSMENT — ENCOUNTER SYMPTOMS
NAUSEA: 0
COUGH: 0
SHORTNESS OF BREATH: 0
RHINORRHEA: 0
VOMITING: 0
DIARRHEA: 0

## 2024-08-26 ASSESSMENT — PAIN DESCRIPTION - ONSET: ONSET: ON-GOING

## 2024-08-26 ASSESSMENT — PAIN - FUNCTIONAL ASSESSMENT: PAIN_FUNCTIONAL_ASSESSMENT: PREVENTS OR INTERFERES SOME ACTIVE ACTIVITIES AND ADLS

## 2024-08-26 ASSESSMENT — PAIN DESCRIPTION - DESCRIPTORS: DESCRIPTORS: THROBBING;SHARP

## 2024-08-26 ASSESSMENT — PAIN DESCRIPTION - ORIENTATION: ORIENTATION: LEFT

## 2024-08-26 ASSESSMENT — PAIN DESCRIPTION - FREQUENCY: FREQUENCY: CONTINUOUS

## 2024-08-26 NOTE — PROGRESS NOTES
[facility]   Progress Note and Procedure Note      Jose Manuel Maya  MEDICAL RECORD NUMBER:  [id number]  AGE: 59 y.o.   GENDER: male  : 1965  EPISODE DATE:  2024    Subjective:     Chief Complaint   Patient presents with    Wound Check     Left foot         HISTORY of PRESENT ILLNESS HPI     Jose Manuel Maya is a 59 y.o. male who presents today for wound/ulcer evaluation.   History of Wound Context: presents for initial wound clinic evaluation of left foot ulcer located between 4th and 5th toes. Seen in ER at [facility] 2024 with c/o left leg pain. Labs and xray were unremarkable.    Had more pain and swelling in left foot, went to [facility] ER 2024, venous doppler and foot xray negative for acute findings. He was prescribed doxycycline with directions to take through 2024. Patient does report that area is less swollen since starting doxycycline.    Wound/Ulcer Pain Timing/Severity: constant  Quality of pain: sharp, throbbing  Severity:  8 / 10   Modifying Factors: Pain worsens with touching  Associated Signs/Symptoms: none    Ulcer Identification:  Ulcer Type: diabetic  Contributing Factors: diabetes, decreased mobility, obesity, and smoking         PAST MEDICAL HISTORY        Diagnosis Date    Acute depression 2018    Back pain     DM2 (diabetes mellitus, type 2) ([facility]) 2014    Generalized OA     GERD (gastroesophageal reflux disease)     HTN (hypertension)     Schizoaffective disorder, bipolar type ([facility]) 3/29/2019    Unspecified sleep apnea     c  pap       PAST SURGICAL HISTORY    Past Surgical History:   Procedure Laterality Date    COLONOSCOPY      TONSILLECTOMY AND ADENOIDECTOMY         FAMILY HISTORY    Family History   Problem Relation Age of Onset    Diabetes Mother     No Known Problems Father        SOCIAL HISTORY    Social History     Tobacco Use    Smoking status: Every Day     Current packs/day: 0.50     Average packs/day: 0.5 packs/day for 41.0  0.06 sq cm     Diabetic/Pressure/Non Pressure Ulcers only:  Ulcer: Diabetic ulcer, fat layer exposed    Estimated Blood Loss:  None    Hemostasis Achieved:  not needed    Procedural Pain:  8  / 10     Post Procedural Pain:  8 / 10     Response to treatment:  Poorly tolerated by patient., With complaints of pain.       Plan:     Silvercel between 4th and 5th toes, cover with dry guaze and ACE wrap, change daily  Continue and finish all doses of doxycycline   Follow up in wound clinic in one week   See Discharge Instructions for further directions     Written patient dismissal instructions given to patient and signed by patient or POA.           Electronically signed by KRISTINA QUINONES CNP on 8/26/2024 at 9:50 AM

## 2024-09-03 ENCOUNTER — FOLLOWUP TELEPHONE ENCOUNTER (OUTPATIENT)
Dept: WOUND CARE | Age: 59
End: 2024-09-03

## 2024-09-03 ENCOUNTER — HOSPITAL ENCOUNTER (OUTPATIENT)
Dept: WOUND CARE | Age: 59
Discharge: HOME | End: 2024-09-03

## 2024-09-03 DIAGNOSIS — L97.509 TYPE 2 DIABETES MELLITUS WITH FOOT ULCER, WITHOUT LONG-TERM CURRENT USE OF INSULIN (HCC): ICD-10-CM

## 2024-09-03 DIAGNOSIS — L97.522 ULCER OF LEFT FOOT WITH FAT LAYER EXPOSED (HCC): Primary | ICD-10-CM

## 2024-09-03 DIAGNOSIS — F17.200 SMOKING: ICD-10-CM

## 2024-09-03 DIAGNOSIS — E11.621 TYPE 2 DIABETES MELLITUS WITH FOOT ULCER, WITHOUT LONG-TERM CURRENT USE OF INSULIN (HCC): ICD-10-CM

## 2024-09-03 NOTE — PROGRESS NOTES
Pt called [facility] to cancel apt for today. He stated he did not have a ride to come to apt. Reschedule for 9/9/24 2747.

## 2024-09-04 ENCOUNTER — HOSPITAL ENCOUNTER (OUTPATIENT)
Dept: WOUND CARE | Age: 59
Discharge: HOME | End: 2024-09-04

## 2024-09-09 ENCOUNTER — HOSPITAL ENCOUNTER (OUTPATIENT)
Dept: WOUND CARE | Age: 59
Discharge: HOME | End: 2024-09-09
Payer: COMMERCIAL

## 2024-09-09 VITALS
SYSTOLIC BLOOD PRESSURE: 135 MMHG | RESPIRATION RATE: 20 BRPM | HEART RATE: 89 BPM | DIASTOLIC BLOOD PRESSURE: 71 MMHG | TEMPERATURE: 96.4 F

## 2024-09-09 DIAGNOSIS — L97.509 TYPE 2 DIABETES MELLITUS WITH FOOT ULCER, WITHOUT LONG-TERM CURRENT USE OF INSULIN (HCC): ICD-10-CM

## 2024-09-09 DIAGNOSIS — E11.621 TYPE 2 DIABETES MELLITUS WITH FOOT ULCER, WITHOUT LONG-TERM CURRENT USE OF INSULIN (HCC): ICD-10-CM

## 2024-09-09 DIAGNOSIS — L97.522 ULCER OF LEFT FOOT WITH FAT LAYER EXPOSED (HCC): Primary | ICD-10-CM

## 2024-09-09 DIAGNOSIS — F17.200 SMOKING: ICD-10-CM

## 2024-09-09 PROCEDURE — 99213 OFFICE O/P EST LOW 20 MIN: CPT | Performed by: NURSE PRACTITIONER

## 2024-09-09 PROCEDURE — 99212 OFFICE O/P EST SF 10 MIN: CPT

## 2024-09-09 RX ORDER — LIDOCAINE HYDROCHLORIDE 40 MG/ML
SOLUTION TOPICAL ONCE
OUTPATIENT
Start: 2024-09-09 | End: 2024-09-09

## 2024-09-09 RX ORDER — LIDOCAINE HYDROCHLORIDE 20 MG/ML
JELLY TOPICAL ONCE
OUTPATIENT
Start: 2024-09-09 | End: 2024-09-09

## 2024-09-09 ASSESSMENT — ENCOUNTER SYMPTOMS
RHINORRHEA: 0
COUGH: 0
VOMITING: 0
SHORTNESS OF BREATH: 0
DIARRHEA: 0
NAUSEA: 0

## 2024-09-09 ASSESSMENT — PAIN SCALES - GENERAL: PAINLEVEL_OUTOF10: 0

## 2024-09-13 ENCOUNTER — OFFICE VISIT (OUTPATIENT)
Dept: GASTROENTEROLOGY | Age: 59
End: 2024-09-13

## 2024-09-13 VITALS
DIASTOLIC BLOOD PRESSURE: 84 MMHG | SYSTOLIC BLOOD PRESSURE: 124 MMHG | TEMPERATURE: 97.4 F | WEIGHT: 183 LBS | BODY MASS INDEX: 30.45 KG/M2

## 2024-09-13 DIAGNOSIS — Z12.12 SCREENING FOR COLORECTAL CANCER: ICD-10-CM

## 2024-09-13 DIAGNOSIS — Z86.010 HISTORY OF COLON POLYPS: Primary | ICD-10-CM

## 2024-09-13 DIAGNOSIS — F17.200 SMOKING: ICD-10-CM

## 2024-09-13 DIAGNOSIS — Z12.11 SCREENING FOR COLORECTAL CANCER: ICD-10-CM

## 2024-09-13 DIAGNOSIS — K21.9 GASTROESOPHAGEAL REFLUX DISEASE WITHOUT ESOPHAGITIS: ICD-10-CM

## 2024-09-13 DIAGNOSIS — R63.4 WEIGHT LOSS: ICD-10-CM

## 2024-09-13 PROBLEM — Z86.0100 HISTORY OF COLON POLYPS: Status: ACTIVE | Noted: 2024-09-13

## 2024-09-13 ASSESSMENT — ENCOUNTER SYMPTOMS
SORE THROAT: 0
VOMITING: 0
ABDOMINAL DISTENTION: 0
CONSTIPATION: 0
CHOKING: 0
COUGH: 0
BLOOD IN STOOL: 0
WHEEZING: 0
DIARRHEA: 0
TROUBLE SWALLOWING: 0
ANAL BLEEDING: 0
SHORTNESS OF BREATH: 0
ABDOMINAL PAIN: 0
COLOR CHANGE: 0
NAUSEA: 0
RECTAL PAIN: 0

## 2024-09-18 RX ORDER — BISACODYL 5 MG/1
TABLET, DELAYED RELEASE ORAL
Qty: 4 TABLET | Refills: 0 | Status: SHIPPED | OUTPATIENT
Start: 2024-09-18

## 2024-09-18 RX ORDER — POLYETHYLENE GLYCOL 3350 17 G/17G
POWDER, FOR SOLUTION ORAL
Qty: 238 G | Refills: 0 | Status: SHIPPED | OUTPATIENT
Start: 2024-09-18

## 2024-10-13 PROBLEM — Z12.11 SCREENING FOR COLORECTAL CANCER: Status: RESOLVED | Noted: 2024-09-13 | Resolved: 2024-10-13

## 2024-10-13 PROBLEM — Z12.12 SCREENING FOR COLORECTAL CANCER: Status: RESOLVED | Noted: 2024-09-13 | Resolved: 2024-10-13

## 2024-12-21 ENCOUNTER — HOSPITAL ENCOUNTER (EMERGENCY)
Age: 59
Discharge: HOME | End: 2024-12-21
Attending: HEALTH CARE PROVIDER
Payer: COMMERCIAL

## 2024-12-21 VITALS
BODY MASS INDEX: 31.96 KG/M2 | SYSTOLIC BLOOD PRESSURE: 129 MMHG | DIASTOLIC BLOOD PRESSURE: 84 MMHG | WEIGHT: 191.8 LBS | HEART RATE: 73 BPM | TEMPERATURE: 97.9 F | RESPIRATION RATE: 18 BRPM | OXYGEN SATURATION: 100 % | HEIGHT: 65 IN

## 2024-12-21 DIAGNOSIS — Z20.2 POSSIBLE EXPOSURE TO STD: Primary | ICD-10-CM

## 2024-12-21 LAB
BILIRUB UR QL STRIP: NEGATIVE
CLARITY UR: CLEAR
COLOR UR: YELLOW
COMMENT: NORMAL
GLUCOSE UR STRIP-MCNC: NEGATIVE MG/DL
HGB UR QL STRIP.AUTO: NEGATIVE
HIV 1+2 AB+HIV1 P24 AG SERPL QL IA: NONREACTIVE
KETONES UR STRIP-MCNC: NEGATIVE MG/DL
LEUKOCYTE ESTERASE UR QL STRIP: NEGATIVE
NITRITE UR QL STRIP: NEGATIVE
PH UR STRIP: 5.5 [PH] (ref 5–8)
PROT UR STRIP-MCNC: NEGATIVE MG/DL
SOURCE: NORMAL
SP GR UR STRIP: 1.01 (ref 1–1.03)
T PALLIDUM AB SER QL IA: NONREACTIVE
TRICHOMONAS VAGINALI, MOLECULAR: NEGATIVE
UROBILINOGEN UR STRIP-ACNC: NORMAL EU/DL (ref 0–1)

## 2024-12-21 PROCEDURE — 87389 HIV-1 AG W/HIV-1&-2 AB AG IA: CPT

## 2024-12-21 PROCEDURE — 87591 N.GONORRHOEAE DNA AMP PROB: CPT

## 2024-12-21 PROCEDURE — 6360000002 HC RX W HCPCS: Performed by: STUDENT IN AN ORGANIZED HEALTH CARE EDUCATION/TRAINING PROGRAM

## 2024-12-21 PROCEDURE — 86780 TREPONEMA PALLIDUM: CPT

## 2024-12-21 PROCEDURE — 81003 URINALYSIS AUTO W/O SCOPE: CPT

## 2024-12-21 PROCEDURE — 87491 CHLMYD TRACH DNA AMP PROBE: CPT

## 2024-12-21 PROCEDURE — 87661 TRICHOMONAS VAGINALIS AMPLIF: CPT

## 2024-12-21 PROCEDURE — 96372 THER/PROPH/DIAG INJ SC/IM: CPT

## 2024-12-21 PROCEDURE — 99284 EMERGENCY DEPT VISIT MOD MDM: CPT

## 2024-12-21 RX ORDER — CEFTRIAXONE 500 MG/1
500 INJECTION, POWDER, FOR SOLUTION INTRAMUSCULAR; INTRAVENOUS ONCE
Status: COMPLETED | OUTPATIENT
Start: 2024-12-21 | End: 2024-12-21

## 2024-12-21 RX ORDER — DOXYCYCLINE HYCLATE 100 MG
100 TABLET ORAL 2 TIMES DAILY
Qty: 14 TABLET | Refills: 0 | Status: SHIPPED | OUTPATIENT
Start: 2024-12-21 | End: 2024-12-28

## 2024-12-21 RX ADMIN — CEFTRIAXONE SODIUM 500 MG: 500 INJECTION, POWDER, FOR SOLUTION INTRAMUSCULAR; INTRAVENOUS at 09:04

## 2024-12-21 ASSESSMENT — LIFESTYLE VARIABLES
HOW MANY STANDARD DRINKS CONTAINING ALCOHOL DO YOU HAVE ON A TYPICAL DAY: PATIENT DECLINED
HOW OFTEN DO YOU HAVE A DRINK CONTAINING ALCOHOL: PATIENT DECLINED

## 2024-12-21 ASSESSMENT — PAIN - FUNCTIONAL ASSESSMENT: PAIN_FUNCTIONAL_ASSESSMENT: 0-10

## 2024-12-21 ASSESSMENT — ENCOUNTER SYMPTOMS
COUGH: 0
NAUSEA: 0
ABDOMINAL PAIN: 0
SHORTNESS OF BREATH: 0
VOMITING: 0

## 2024-12-21 ASSESSMENT — PAIN DESCRIPTION - DESCRIPTORS: DESCRIPTORS: BURNING

## 2024-12-21 ASSESSMENT — PAIN SCALES - GENERAL: PAINLEVEL_OUTOF10: 6

## 2024-12-21 ASSESSMENT — PAIN DESCRIPTION - LOCATION: LOCATION: PENIS

## 2024-12-21 NOTE — ED PROVIDER NOTES
[facility] EMERGENCY DEPARTMENT  Emergency Department Encounter  Emergency Medicine Resident     Pt Name:Jose Manuel Maya  MRN: [id number]  Birthdate 1965  Date of evaluation: 12/21/24  PCP:  Zuleyka Gill APRN - CNP  Note Started: 7:07 AM EST      CHIEF COMPLAINT       Chief Complaint   Patient presents with    Dysuria    Penile Discharge     Reports white discharge         HISTORY OF PRESENT ILLNESS  (Location/Symptom, Timing/Onset, Context/Setting, Quality, Duration, Modifying Factors, Severity.)      Jose Manuel Maya is a 59 y.o. male who presents with dysuria, penile discharge x 3 days.  States discharge is white.  Reports burning sensation with urination.  Reports he is sexually active.  Denies fever, chills, nausea, vomiting, chest pain, shortness breath, cough, abdominal pain, testicular/penile swelling, testicular pain.  Denies any rashes or ulcers.    PAST MEDICAL / SURGICAL / SOCIAL / FAMILY HISTORY      has a past medical history of Acute depression, Back pain, DM2 (diabetes mellitus, type 2) ([facility]), Generalized OA, GERD (gastroesophageal reflux disease), HTN (hypertension), Schizoaffective disorder, bipolar type ([facility]), and Unspecified sleep apnea.       has a past surgical history that includes Tonsillectomy and adenoidectomy and Colonoscopy.      Social History     Socioeconomic History    Marital status: Single     Spouse name: Not on file    Number of children: Not on file    Years of education: Not on file    Highest education level: Not on file   Occupational History    Not on file   Tobacco Use    Smoking status: Every Day     Current packs/day: 0.50     Average packs/day: 0.5 packs/day for 41.0 years (20.5 ttl pk-yrs)     Types: Cigarettes    Smokeless tobacco: Never   Vaping Use    Vaping status: Never Used   Substance and Sexual Activity    Alcohol use: No    Drug use: Yes     Types: Marijuana (Weed), Cocaine     Comment: pt denies +cocaine 12/6/21    Sexual activity: Not on

## 2024-12-21 NOTE — ED PROVIDER NOTES
[facility] EMERGENCY DEPARTMENT     Emergency Department     Faculty Attestation        I performed a history and physical examination of the patient and discussed management with the resident. I reviewed the resident’s note and agree with the documented findings and plan of care. Any areas of disagreement are noted on the chart. I was personally present for the key portions of any procedures. I have documented in the chart those procedures where I was not present during the key portions. I have reviewed the emergency nurses triage note. I agree with the chief complaint, past medical history, past surgical history, allergies, medications, social and family history as documented unless otherwise noted below.    For mid-level providers such as nurse practitioners as well as physicians assistants:    I have personally seen and evaluated the patient.    I find the patient's history and physical exam are consistent with NP/PA documentation.  I agree with the care provided, treatment rendered, disposition, & follow-up plan.     Additional findings are as noted.    Vital Signs: /84   Pulse 73   Temp 97.9 °F (36.6 °C)   Resp 18   Ht 1.651 m (5' 5\")   Wt 87 kg (191 lb 12.8 oz)   SpO2 100%   BMI 31.92 kg/m²   PCP:  Zuleyka Gill, APRN - CNP    Pertinent Comments:     Patient is a 59 y.o. male who presents at this time with concerns for STD symptoms. At time of initial examination patient was in no acute distress, vital signs are stable. Patient declining  exam.    Concern for STD/STD exposure given patient's presentation. No evidence or concern of systemic disease.     Patient requests prophylactic treatment at this time and will be treated with Ceftriaxone and Doxycycline. Will send GC/chlamydia/trichomoniasis. Patient is requesting further screen for HIV/syphilis at this time. Importance of informing partners discussed at length.    Reasons to return to the

## 2024-12-21 NOTE — ED TRIAGE NOTES
Pt presents to ED from home c/o penile discharge, burning and itching in his groin x 3 days. Pt rates burning as 6/10. Pt reports having unprotected sex recently. Pt denies n/v/d, LOC, CP, SOB, fever, chills, injury/trauma, loss of appetite, or any other sx. Pt has hx of HTN but denies any other medical hx.     Pt is A&OX4, resting comfortably in chair, NAD. Resp even and unlabored, NAD. Pt vitals WNL.

## 2024-12-21 NOTE — ED NOTES
The following labs were labeled with appropriate pt sticker and tubed to lab:     [] Blue     [] Lavender   [] on ice  [] Green/yellow  [] Green/black [] on ice  [] Birch  [] on ice  [x] Yellow  [] Red  [] Pink  [] Type/ Screen  [] ABG  [] VBG    [] COVID-19 swab    [] Rapid  [] PCR  [] Flu swab  [] Peds Viral Panel     [] Urine Sample  [] Fecal Sample  [] Pelvic Cultures  [] Blood Cultures  [] X 2  [] STREP Cultures  [] Wound Cultures

## 2024-12-21 NOTE — ED NOTES
Patient in need of transportation at discharge. Writer provided patient with bus pass as there was a long wait time for insurance company.

## 2024-12-23 LAB
CHLAMYDIA DNA UR QL NAA+PROBE: NEGATIVE
N GONORRHOEA DNA UR QL NAA+PROBE: NEGATIVE
SPECIMEN DESCRIPTION: NORMAL